# Patient Record
Sex: FEMALE | Race: WHITE | Employment: OTHER | URBAN - METROPOLITAN AREA
[De-identification: names, ages, dates, MRNs, and addresses within clinical notes are randomized per-mention and may not be internally consistent; named-entity substitution may affect disease eponyms.]

---

## 2017-01-13 ENCOUNTER — HOSPITAL ENCOUNTER (OUTPATIENT)
Dept: PHYSICAL THERAPY | Facility: CLINIC | Age: 67
Setting detail: THERAPIES SERIES
End: 2017-01-13
Attending: PSYCHIATRY & NEUROLOGY
Payer: COMMERCIAL

## 2017-01-13 PROCEDURE — 40000718 ZZHC STATISTIC PT DEPARTMENT ORTHO VISIT: Performed by: PHYSICAL THERAPIST

## 2017-01-13 PROCEDURE — 97140 MANUAL THERAPY 1/> REGIONS: CPT | Mod: GP | Performed by: PHYSICAL THERAPIST

## 2017-01-13 PROCEDURE — 97110 THERAPEUTIC EXERCISES: CPT | Mod: GP | Performed by: PHYSICAL THERAPIST

## 2017-01-23 ENCOUNTER — HOSPITAL ENCOUNTER (OUTPATIENT)
Dept: PHYSICAL THERAPY | Facility: CLINIC | Age: 67
Setting detail: THERAPIES SERIES
End: 2017-01-23
Attending: PSYCHIATRY & NEUROLOGY
Payer: COMMERCIAL

## 2017-01-23 PROCEDURE — 97140 MANUAL THERAPY 1/> REGIONS: CPT | Mod: GP | Performed by: PHYSICAL THERAPIST

## 2017-01-23 PROCEDURE — 97110 THERAPEUTIC EXERCISES: CPT | Mod: GP | Performed by: PHYSICAL THERAPIST

## 2017-01-23 PROCEDURE — 40000718 ZZHC STATISTIC PT DEPARTMENT ORTHO VISIT: Performed by: PHYSICAL THERAPIST

## 2017-02-08 ENCOUNTER — HOSPITAL ENCOUNTER (OUTPATIENT)
Dept: PHYSICAL THERAPY | Facility: CLINIC | Age: 67
Setting detail: THERAPIES SERIES
End: 2017-02-08
Attending: PSYCHIATRY & NEUROLOGY
Payer: COMMERCIAL

## 2017-02-08 PROCEDURE — 97140 MANUAL THERAPY 1/> REGIONS: CPT | Mod: GP | Performed by: PHYSICAL THERAPIST

## 2017-02-08 PROCEDURE — 97535 SELF CARE MNGMENT TRAINING: CPT | Mod: GP | Performed by: PHYSICAL THERAPIST

## 2017-02-08 PROCEDURE — 40000718 ZZHC STATISTIC PT DEPARTMENT ORTHO VISIT: Performed by: PHYSICAL THERAPIST

## 2017-02-08 PROCEDURE — 97110 THERAPEUTIC EXERCISES: CPT | Mod: GP | Performed by: PHYSICAL THERAPIST

## 2017-03-02 ENCOUNTER — HOSPITAL ENCOUNTER (OUTPATIENT)
Dept: PHYSICAL THERAPY | Facility: CLINIC | Age: 67
Setting detail: THERAPIES SERIES
End: 2017-03-02
Attending: PSYCHIATRY & NEUROLOGY
Payer: COMMERCIAL

## 2017-03-02 PROCEDURE — 97140 MANUAL THERAPY 1/> REGIONS: CPT | Mod: GP | Performed by: PHYSICAL THERAPIST

## 2017-03-02 PROCEDURE — 97110 THERAPEUTIC EXERCISES: CPT | Mod: GP | Performed by: PHYSICAL THERAPIST

## 2017-03-02 PROCEDURE — 40000718 ZZHC STATISTIC PT DEPARTMENT ORTHO VISIT: Performed by: PHYSICAL THERAPIST

## 2017-03-17 ENCOUNTER — HOSPITAL ENCOUNTER (OUTPATIENT)
Dept: PHYSICAL THERAPY | Facility: CLINIC | Age: 67
Setting detail: THERAPIES SERIES
End: 2017-03-17
Attending: PSYCHIATRY & NEUROLOGY
Payer: COMMERCIAL

## 2017-03-17 PROCEDURE — 40000718 ZZHC STATISTIC PT DEPARTMENT ORTHO VISIT: Performed by: PHYSICAL THERAPIST

## 2017-03-17 PROCEDURE — 97110 THERAPEUTIC EXERCISES: CPT | Mod: GP | Performed by: PHYSICAL THERAPIST

## 2017-03-17 PROCEDURE — 97140 MANUAL THERAPY 1/> REGIONS: CPT | Mod: GP | Performed by: PHYSICAL THERAPIST

## 2017-03-17 NOTE — PROGRESS NOTES
Outpatient Physical Therapy Discharge Note     Patient: Marcia Kauffman  : 1950    Beginning/End Dates of Reporting Period:  2016 to 3/17/2017    Referring Provider: Hilario Davis Diagnosis: Increased Hip flexor tightness secondary to chronic LBP     Client Self Report: Pt states she feels that her exercises have worked well for her, she has noticed a large difference from when she started to now. She is ready to d/c and continue with her home program today.    Goals:  Goal Identifier HEP   Goal Description Pt will be able to perform HEP with proper form and duration   Target Date 16   Date Met  16   Progress:     Goal Identifier Ambulation   Goal Description Pt will be able to walk to and from her mailbox with <2/10 pain (1/3 mile)   Target Date 17   Date Met  17   Progress:     Goal Identifier Strength   Goal Description Pt will demonstrate 4+/5 left hip flexor strength in order to perform ambulation without gait deviations   Target Date 17   Date Met  16   Progress:     Goal Identifier     Goal Description     Target Date     Date Met      Progress:     Goal Identifier     Goal Description     Target Date     Date Met      Progress:     Goal Identifier     Goal Description     Target Date     Date Met      Progress:     Goal Identifier     Goal Description     Target Date     Date Met      Progress:     Goal Identifier     Goal Description     Target Date     Date Met      Progress:     Progress Toward Goals:   Progress this reporting period: Pt demonstrates progress with strength as indicated by achieving goals and decreased tightness. Pt understands how to self manage symptoms with gua jarrett tool and stretching to decreased symptoms.          Plan:  Discharge from therapy.    Discharge:    Reason for Discharge: Patient has met all goals.    Equipment Issued: CED, MARGIE    Discharge Plan: Patient to continue home program.

## 2017-06-01 ENCOUNTER — AMBULATORY - HEALTHEAST (OUTPATIENT)
Dept: NEUROSURGERY | Facility: CLINIC | Age: 67
End: 2017-06-01

## 2017-06-08 ENCOUNTER — HOSPITAL ENCOUNTER (OUTPATIENT)
Dept: MRI IMAGING | Facility: CLINIC | Age: 67
Discharge: HOME | End: 2017-06-08
Attending: ORTHOPAEDIC SURGERY | Admitting: ORTHOPAEDIC SURGERY
Payer: COMMERCIAL

## 2017-06-08 ENCOUNTER — AMBULATORY - HEALTHEAST (OUTPATIENT)
Dept: NEUROSURGERY | Facility: CLINIC | Age: 67
End: 2017-06-08

## 2017-06-08 DIAGNOSIS — M54.9 BACK PAIN: ICD-10-CM

## 2017-06-08 PROCEDURE — 72158 MRI LUMBAR SPINE W/O & W/DYE: CPT

## 2017-06-08 PROCEDURE — A9585 GADOBUTROL INJECTION: HCPCS | Performed by: ORTHOPAEDIC SURGERY

## 2017-06-08 PROCEDURE — 25000128 H RX IP 250 OP 636: Performed by: ORTHOPAEDIC SURGERY

## 2017-06-08 RX ORDER — GADOBUTROL 604.72 MG/ML
5 INJECTION INTRAVENOUS ONCE
Status: COMPLETED | OUTPATIENT
Start: 2017-06-08 | End: 2017-06-08

## 2017-06-08 RX ADMIN — GADOBUTROL 5 ML: 604.72 INJECTION INTRAVENOUS at 10:36

## 2017-06-17 ENCOUNTER — AMBULATORY - HEALTHEAST (OUTPATIENT)
Dept: NEUROSURGERY | Facility: CLINIC | Age: 67
End: 2017-06-17

## 2017-06-19 ENCOUNTER — AMBULATORY - HEALTHEAST (OUTPATIENT)
Dept: NEUROSURGERY | Facility: CLINIC | Age: 67
End: 2017-06-19

## 2017-06-26 ENCOUNTER — COMMUNICATION - HEALTHEAST (OUTPATIENT)
Dept: NEUROSURGERY | Facility: CLINIC | Age: 67
End: 2017-06-26

## 2017-06-26 ENCOUNTER — AMBULATORY - HEALTHEAST (OUTPATIENT)
Dept: NEUROSURGERY | Facility: CLINIC | Age: 67
End: 2017-06-26

## 2017-06-27 ENCOUNTER — AMBULATORY - HEALTHEAST (OUTPATIENT)
Dept: NEUROSURGERY | Facility: CLINIC | Age: 67
End: 2017-06-27

## 2017-06-27 DIAGNOSIS — M54.9 BACK PAIN: ICD-10-CM

## 2017-07-03 ENCOUNTER — AMBULATORY - HEALTHEAST (OUTPATIENT)
Dept: NEUROSURGERY | Facility: CLINIC | Age: 67
End: 2017-07-03

## 2017-07-03 ENCOUNTER — OFFICE VISIT - HEALTHEAST (OUTPATIENT)
Dept: NEUROSURGERY | Facility: CLINIC | Age: 67
End: 2017-07-03

## 2017-07-03 ENCOUNTER — HOSPITAL ENCOUNTER (OUTPATIENT)
Dept: RADIOLOGY | Facility: CLINIC | Age: 67
Discharge: HOME | End: 2017-07-03
Attending: NEUROLOGICAL SURGERY

## 2017-07-03 ENCOUNTER — TRANSFERRED RECORDS (OUTPATIENT)
Dept: HEALTH INFORMATION MANAGEMENT | Facility: CLINIC | Age: 67
End: 2017-07-03

## 2017-07-03 DIAGNOSIS — M43.16 SPONDYLOLISTHESIS OF LUMBAR REGION: ICD-10-CM

## 2017-07-03 DIAGNOSIS — M40.36 FLATBACK SYNDROME OF LUMBAR REGION: ICD-10-CM

## 2017-07-03 DIAGNOSIS — Z98.1 HISTORY OF LUMBAR FUSION: ICD-10-CM

## 2017-07-03 DIAGNOSIS — M53.3 SACROILIAC JOINT DYSFUNCTION OF RIGHT SIDE: ICD-10-CM

## 2017-07-03 DIAGNOSIS — R20.0 NUMBNESS AND TINGLING IN LEFT HAND: ICD-10-CM

## 2017-07-03 DIAGNOSIS — G57.12 MERALGIA PARESTHETICA OF LEFT SIDE: ICD-10-CM

## 2017-07-03 DIAGNOSIS — R20.2 NUMBNESS AND TINGLING IN LEFT HAND: ICD-10-CM

## 2017-07-03 DIAGNOSIS — M54.9 BACK PAIN: ICD-10-CM

## 2017-07-03 DIAGNOSIS — M81.0 OSTEOPOROSIS: ICD-10-CM

## 2017-07-03 DIAGNOSIS — M54.2 NECK PAIN OF OVER 3 MONTHS DURATION: ICD-10-CM

## 2017-07-03 DIAGNOSIS — M47.26 OTHER SPONDYLOSIS WITH RADICULOPATHY, LUMBAR REGION: ICD-10-CM

## 2017-07-03 ASSESSMENT — MIFFLIN-ST. JEOR: SCORE: 1075.6

## 2017-07-12 ENCOUNTER — HOSPITAL ENCOUNTER (OUTPATIENT)
Dept: CT IMAGING | Facility: CLINIC | Age: 67
Discharge: HOME | End: 2017-07-12
Attending: NEUROLOGICAL SURGERY

## 2017-07-12 ENCOUNTER — HOSPITAL ENCOUNTER (OUTPATIENT)
Dept: MRI IMAGING | Facility: CLINIC | Age: 67
Discharge: HOME | End: 2017-07-12
Attending: NEUROLOGICAL SURGERY

## 2017-07-12 ENCOUNTER — TRANSFERRED RECORDS (OUTPATIENT)
Dept: HEALTH INFORMATION MANAGEMENT | Facility: CLINIC | Age: 67
End: 2017-07-12

## 2017-07-12 ENCOUNTER — HOSPITAL ENCOUNTER (OUTPATIENT)
Dept: RADIOLOGY | Facility: CLINIC | Age: 67
Discharge: HOME | End: 2017-07-12
Attending: NEUROLOGICAL SURGERY

## 2017-07-12 DIAGNOSIS — Z98.1 HISTORY OF LUMBAR FUSION: ICD-10-CM

## 2017-07-12 DIAGNOSIS — Z98.890 OTHER SPECIFIED POSTPROCEDURAL STATES: ICD-10-CM

## 2017-07-12 DIAGNOSIS — R20.0 NUMBNESS AND TINGLING IN LEFT HAND: ICD-10-CM

## 2017-07-12 DIAGNOSIS — R20.2 NUMBNESS AND TINGLING IN LEFT HAND: ICD-10-CM

## 2017-07-12 DIAGNOSIS — M40.36 FLATBACK SYNDROME OF LUMBAR REGION: ICD-10-CM

## 2017-07-12 DIAGNOSIS — M47.26 OTHER SPONDYLOSIS WITH RADICULOPATHY, LUMBAR REGION: ICD-10-CM

## 2017-07-28 ENCOUNTER — OFFICE VISIT - HEALTHEAST (OUTPATIENT)
Dept: NEUROSURGERY | Facility: CLINIC | Age: 67
End: 2017-07-28

## 2017-07-28 ENCOUNTER — MEDICAL CORRESPONDENCE (OUTPATIENT)
Dept: HEALTH INFORMATION MANAGEMENT | Facility: CLINIC | Age: 67
End: 2017-07-28

## 2017-07-28 ENCOUNTER — TRANSFERRED RECORDS (OUTPATIENT)
Dept: HEALTH INFORMATION MANAGEMENT | Facility: CLINIC | Age: 67
End: 2017-07-28

## 2017-07-28 DIAGNOSIS — M53.3 SACROILIAC JOINT DYSFUNCTION OF RIGHT SIDE: ICD-10-CM

## 2017-07-28 DIAGNOSIS — Z98.1 HISTORY OF LUMBAR FUSION: ICD-10-CM

## 2017-07-28 DIAGNOSIS — S32.009K LUMBAR PSEUDOARTHROSIS: ICD-10-CM

## 2017-07-28 DIAGNOSIS — R20.0 NUMBNESS AND TINGLING IN LEFT HAND: ICD-10-CM

## 2017-07-28 DIAGNOSIS — R20.2 NUMBNESS AND TINGLING IN LEFT HAND: ICD-10-CM

## 2017-07-28 DIAGNOSIS — M43.16 SPONDYLOLISTHESIS OF LUMBAR REGION: ICD-10-CM

## 2017-07-28 DIAGNOSIS — M81.0 OSTEOPOROSIS: ICD-10-CM

## 2017-07-28 DIAGNOSIS — M40.36 FLATBACK SYNDROME OF LUMBAR REGION: ICD-10-CM

## 2017-07-28 ASSESSMENT — MIFFLIN-ST. JEOR: SCORE: 1075.6

## 2017-08-11 ENCOUNTER — COMMUNICATION - HEALTHEAST (OUTPATIENT)
Dept: NEUROSURGERY | Facility: CLINIC | Age: 67
End: 2017-08-11

## 2017-09-25 ENCOUNTER — COMMUNICATION - HEALTHEAST (OUTPATIENT)
Dept: RADIOLOGY | Facility: CLINIC | Age: 67
End: 2017-09-25

## 2017-12-11 NOTE — TELEPHONE ENCOUNTER
Records Received From: [facility]    Date/Exam/Location  (specify location if different)   Office Notes: 7/28/17   Radiology Reports: XR Lumbar 7/3/17  XR Standing Spine 7/15/17  CT L Spine 7/12/17  MRI C Spine 7/12/17   Missing: Need imaging

## 2017-12-11 NOTE — TELEPHONE ENCOUNTER
APPT INFO    Date /Time: 12/21/17 8AM   Reason for Appt: Flat Back/ SI Joint Dysfunction   Ref Provider/Clinic: Dr. Christina Beckford - [facility]   Are there internal records? If yes, list: YES -     [facility]     Spinal Fusion 10/20/15 report in [facility]    MRI L Spine 6/8/17 in [facility], additional imaging in pacs (older imaging)   Patient Contact (Y/N) & Call Details: No - pt is referred   Action: Faxed request to [facility] to push imaging    [facility] records received & [facility]     OUTSIDE RECORDS CHECKLIST     CLINIC NAME COMMENTS REC (x) IMG (x)   [facility]  x x   [facility] Records scanned in Epic x na

## 2017-12-12 NOTE — TELEPHONE ENCOUNTER
Received Imaging From: [facility]    Image Type (x): Disc:_____  Pacs:___x__      Exam Date/Name: See previous note Comments: imaging in pacs

## 2017-12-19 ASSESSMENT — ENCOUNTER SYMPTOMS
LOSS OF CONSCIOUSNESS: 0
PARALYSIS: 0
BACK PAIN: 1
SPEECH CHANGE: 0
DYSURIA: 0
HEMATURIA: 0
MUSCLE CRAMPS: 1
DISTURBANCES IN COORDINATION: 0
NUMBNESS: 1
HEADACHES: 0
NECK PAIN: 1
DIZZINESS: 1
ARTHRALGIAS: 1
JOINT SWELLING: 0
FLANK PAIN: 0
DIFFICULTY URINATING: 0
TINGLING: 1
SEIZURES: 0
MYALGIAS: 1
TREMORS: 0
MUSCLE WEAKNESS: 1
WEAKNESS: 1
MEMORY LOSS: 0
STIFFNESS: 1

## 2017-12-21 ENCOUNTER — APPOINTMENT (OUTPATIENT)
Dept: SURGERY | Facility: CLINIC | Age: 67
End: 2017-12-21
Payer: COMMERCIAL

## 2017-12-21 ENCOUNTER — RADIANT APPOINTMENT (OUTPATIENT)
Dept: GENERAL RADIOLOGY | Facility: CLINIC | Age: 67
End: 2017-12-21
Payer: COMMERCIAL

## 2017-12-21 ENCOUNTER — OFFICE VISIT (OUTPATIENT)
Dept: SURGERY | Facility: CLINIC | Age: 67
End: 2017-12-21
Payer: COMMERCIAL

## 2017-12-21 ENCOUNTER — ANESTHESIA EVENT (OUTPATIENT)
Dept: SURGERY | Facility: CLINIC | Age: 67
End: 2017-12-21

## 2017-12-21 ENCOUNTER — PRE VISIT (OUTPATIENT)
Dept: ORTHOPEDICS | Facility: CLINIC | Age: 67
End: 2017-12-21

## 2017-12-21 ENCOUNTER — OFFICE VISIT (OUTPATIENT)
Dept: ORTHOPEDICS | Facility: CLINIC | Age: 67
End: 2017-12-21
Payer: COMMERCIAL

## 2017-12-21 VITALS — WEIGHT: 128.8 LBS | BODY MASS INDEX: 21.99 KG/M2 | HEIGHT: 64 IN

## 2017-12-21 VITALS
BODY MASS INDEX: 21.99 KG/M2 | HEART RATE: 62 BPM | HEIGHT: 64 IN | TEMPERATURE: 98.4 F | SYSTOLIC BLOOD PRESSURE: 108 MMHG | RESPIRATION RATE: 16 BRPM | OXYGEN SATURATION: 98 % | DIASTOLIC BLOOD PRESSURE: 62 MMHG | WEIGHT: 128.8 LBS

## 2017-12-21 DIAGNOSIS — M40.30 FLAT BACK SYNDROME, ACQUIRED: ICD-10-CM

## 2017-12-21 DIAGNOSIS — M40.30 FLAT BACK SYNDROME, ACQUIRED: Primary | ICD-10-CM

## 2017-12-21 RX ORDER — IBUPROFEN 200 MG
200 TABLET ORAL DAILY
Status: ON HOLD | COMMUNITY
End: 2018-05-17

## 2017-12-21 RX ORDER — NABUMETONE 500 MG/1
500 TABLET, FILM COATED ORAL PRN
Status: ON HOLD | COMMUNITY
End: 2018-05-17

## 2017-12-21 ASSESSMENT — ENCOUNTER SYMPTOMS: DYSRHYTHMIAS: 1

## 2017-12-21 NOTE — NURSING NOTE
"Reason For Visit:   Chief Complaint   Patient presents with     Eval/Assessment     low back pain       Primary MD: Ritika Pierce  Ref. MD: Dr. Nanette Beckford      Occupation retired.  Date of injury: ongoing    Date of surgery: 10/20/15  Type of surgery: low back fusion.  Smoker: No      Ht 1.63 m (5' 4.17\")  Wt 58.4 kg (128 lb 12.8 oz)  BMI 21.99 kg/m2    Pain Assessment  Patient Currently in Pain: Yes  0-10 Pain Scale: 6  Primary Pain Location: Back  Pain Orientation: Lower  Other Pain Locations: left hip and leg  Pain Descriptors: Sharp, Dull, Aching, Burning, Throbbing  Alleviating Factors: Stretching, Other (comment), NSAIDS (massage)  Aggravating Factors: Other (comment) (everything)    Oswestry (ODILON) Questionnaire    OSWESTRY DISABILITY INDEX 12/19/2017   Section 1 - Pain intensity 2   Section 2 - Personal care (washing, dressing, etc.)  0   Section 3 - Lifting 3   Section 4 - Walking 1   Section 5 - Sitting 2   Section 6 - Standing 2   Section 7 - Sleeping 1   Section 8 - Sex life (if applicable) 5   Section 9 - Social life 1   Section 10 - Traveling 1   Count 10   Sum 18   Oswestry Score (%) 36   SECTION 1-PAIN INTENSITY The pain is moderate at the moment.   SECTION 2-PERSONAL CARE (WASHING,DRESSING,ETC.) I can look after myself normally without causing additional pain.   SECTION 3-LIFTING Pain prevents me from lifting heavy weights but I can manage light to medium weights if they are conveniently positioned.   SECTION 4-WALKING Pain prevents me from walking more than one mile.   SECTION 5-SITTING Pain prevents me from sitting for more than 1 hour.   SECTION 6-STANDING Pain prevents me from standing for more than 1 hour.   SECTION 7-SLEEPING My sleep is occasionally interrupted by pain.   SECTION 8-SEX LIFE (IF APPLICABLE) Pain prevents me from having any sex life at all.   SECTION 9-SOCIAL LIFE My social life is normal but increases the degree of pain.   SECTION 10-TRAVELING I can travel anywhere " but it gives me additional pain.   Oswestry Disability Index: Count 10   ODILON: Total Score = SUM (total for answered questions) 18   Computed Oswestry Score 36 (%)   Some recent data might be hidden                  Visual Analog Pain Scale  Back Pain Scale 0-10: 3  Right leg pain: 0  Left leg pain: 3    Promis 10 Assessment    PROMIS 10 12/19/2017   In general, would you say your health is: Good   In general, would you say your quality of life is: Good   In general, how would you rate your physical health? Good   In general, how would you rate your mental health, including your mood and your ability to think? Very good   In general, how would you rate your satisfaction with your social activities and relationships? Very good   In general, please rate how well you carry out your usual social activities and roles Very good   To what extent are you able to carry out your everyday physical activities such as walking, climbing stairs, carrying groceries, or moving a chair? Mostly   How often have you been bothered by emotional problems such as feeling anxious, depressed or irritable? Never   How would you rate your fatigue on average? Moderate   How would you rate your pain on average?   0 = No Pain  to  10 = Worst Imaginable Pain 6   In general, would you say your health is: 3   In general, would you say your quality of life is: 3   In general, how would you rate your physical health? 3   In general, how would you rate your mental health, including your mood and your ability to think? 4   In general, how would you rate your satisfaction with your social activities and relationships? 4   In general, please rate how well you carry out your usual social activities and roles. (This includes activities at home, at work and in your community, and responsibilities as a parent, child, spouse, employee, friend, etc.) 4   To what extent are you able to carry out your everyday physical activities such as walking, climbing stairs,  carrying groceries, or moving a chair? 4   In the past 7 days, how often have you been bothered by emotional problems such as feeling anxious, depressed, or irritable? 1   In the past 7 days, how would you rate your fatigue on average? 3   In the past 7 days, how would you rate your pain on average, where 0 means no pain, and 10 means worst imaginable pain? 6   Global Mental Health Score 16   Global Physical Health Score 13   PROMIS TOTAL - SUBSCORES 29   Some recent data might be hidden                Gerda Shearer LPN

## 2017-12-21 NOTE — CONSULTS
Anesthesia Consult Note      Reason for consult: History Cardiac Arrhythmia with need for complex spinal surgery.    Date of Encounter: 12/21/2017  Referring Physician: Dr. Beckford  Primary Care Physician:  No primary care provider on file.      WENDY Kauffman is a 67 year old female who presents for anesthesia consult in preparation for possible T10 to pelvis fusion with posterior osteomtomies with Dr. Beckford on TBD at [facility].   Patient is here for risk evaluation for complex spinal surgery with date TBD. She is a 67 year old female with flat back syndrome with a history of previous L4-5 posterior instrument infusion. She presents for pre-operative evaluation assessment as requested by Dr. Beckford. She requires evaluation and anesthesia risk assessment prior to undergoing surgery/procedure  History is obtained from the patient.     Past Medical History  Past Medical History:   Diagnosis Date     Endometriosis, site unspecified      Herpes simplex without mention of complication     HSV 2     MEDICAL HISTORY OF -     L4/5 spondololisthesis     PONV (postoperative nausea and vomiting)      Postmenopausal atrophic vaginitis 2004    Estring vag ring-stopped     Premature ventricular contractions 2007     Radial styloid tenosynovitis     arthritis in back and wrists     Spinal stenosis, lumbar region, without neurogenic claudication      Symptomatic menopausal or female climacteric states     prev on HRT-orthoprefest, effexor, paxil       Past Surgical History  Past Surgical History:   Procedure Laterality Date     ARTHROSCOPY KNEE RT/LT  1/2010    Right knee, torn meniscus     BREAST LUMPECTOMY, RT/LT  3/1991    Right benign lump     CHOLECYSTECTOMY, OPEN  1981     FUSION SPINE POSTERIOR TWO LEVELS Left 10/20/2015    Procedure: FUSION SPINE POSTERIOR TWO LEVELS;  Surgeon: Leo Dixon MD;  Location: [facility] OR     H ABLATION PVC  2007     HC EXCIS TENDON SHEATH  LESION FOREARM/WRIST  2006    treatment for frozen thumb     HYSTEROSCOPY  1987     LAPAROSCOPY DIAGNOSTIC (GYN)  1984    3 total scopes for fertility     SURGICAL HISTORY OF -   09/14/95    Excision of cyst on back         Prior to Admission Medications  Current Outpatient Prescriptions   Medication Sig Dispense Refill     ibuprofen (ADVIL/MOTRIN) 200 MG tablet Take 200 mg by mouth daily       UNABLE TO FIND Apply 0.25 tsp. topically daily MEDICATION NAME: Bio- estrol, plant based hormone replacement therapy        valACYclovir (VALTREX) 500 MG tablet Take 1 tablet (500 mg) by mouth 2 times daily For 3 days when flares occur 30 tablet 3     Omega-3 Fatty Acids (OMEGA-3 FISH OIL PO) Take 1,200 mg by mouth 2 times daily (with meals)       NONFORMULARY Take 1 capsule by mouth nightly as needed Sleep [facility]   features l-theanine and taurine to help relaxation and sleep       Coenzyme Q10 (CO Q 10 PO) Take 1 chew tab by mouth every morning        VITAMIN C 500 MG OR TABS 1 tab daily AT BREAKFAST       UNCLASSIFIED OTC PRODUCT MISC Calcium AEP 1-2 capsules by mouth daily  Supports cell membranes* BID       UNCLASSIFIED OTC PRODUCT MISC RelaxaMag  1 capsule at bedtime as needed -  a unique magnesium supplement that helps improve sleep and stress tolerance in the evening       FLAX SEED OIL OR 1 heaping tsp daily on cereal       GLUCOSAMINE CHONDR 500 COMPLEX OR CAPS 1 capsule daily at noon       DAILY MULTI VITAMIN/MINERALS OR 1 TABLET DAILY AT BREAKFAST       nabumetone (RELAFEN) 500 MG tablet Take 500 mg by mouth as needed       montelukast (SINGULAIR) 10 MG tablet Take 1 tablet (10 mg) by mouth daily (Patient taking differently: Take 10 mg by mouth daily as needed ) 30 tablet 11       Allergies  Allergies   Allergen Reactions     Mold        Social History  Social History     Social History     Marital status:      Spouse name: N/A     Number of children: 0     Years of education: N/A     Occupational History       [employer]     nurse assistant      Retired     Social History Main Topics     Smoking status: Never Smoker     Smokeless tobacco: Never Used     Alcohol use 0.0 - 0.6 oz/week     0 - 1 Standard drinks or equivalent per week      Comment: rare     Drug use: No     Sexual activity: No     Other Topics Concern     Parent/Sibling W/ Cabg, Mi Or Angioplasty Before 65f 55m? No     Social History Narrative       Family History  Family History   Problem Relation Age of Onset     Breast Cancer Mother      cancer -free for 17 years, XRT     Arthritis Mother      Eye Disorder Mother      macular degeneration     HEART DISEASE Mother      murmur     CEREBROVASCULAR DISEASE Mother      DIABETES Father      type 2     Arthritis Father      Arthritis Sister      Family History Negative No family hx of      Blood Disease Paternal Grandfather            Review of Systems  Functional status:  independet ADL's.  >4 METS.     The complete review of systems is negative other than noted in the HPI or here.   Constitutional: Denies recent changes in weight, sleeping patterns, or fevers/chills.  Eyes: wears glasses for vision correction.  No recent vision changes.  EENT: Denies recent changes in hearing, mouth pain, or difficulty swallowing.  Cardiovascular: Denies chest pain, VELEZ or orthopnea, or palpitations.  Respiratory: Denies shortness of breath or significant cough.    GI: Denies nausea/vomiting or diarrhea/constipation.    : Denies dysuria.    Musculoskeletal: Denies joint pain or swelling.    Skin: Denies rashes or wounds.    Hematologic: Denies easy bruising or bleeding.    Neurologic: Denies migraines, seizures, dizziness, numbness/tingling.  Psychiatric: Denies changes in mood or affect.        Physical Exam  Constitutional: Patient awake, seated upright in a chair, in no apparent distress.  Appears stated age.  Eyes: Pupils equal, round and reactive to light.  Extra ocular muscles intact. Sclera clear.  Conjunctiva  "normal.  HENT: Head normocephalic.  Oral pharynx intact with moist mucous membranes. No thyromegaly appreciated.   Respiratory: Lung sounds clear to auscultation bilaterally.  No rales, rhonchi, or wheezing noted.    Cardiovascular: S1, S2, regular rate and rhythm.  No murmurs, rubs, or gallops noted. No carotid bruits auscultated.  Radial and pedal pulses palpable, bilaterally.  no edema noted.   GI: Bowel sounds present.  Abdomen: soft, non-tender to light palpation.  No hepatosplenomegaly or masses palpated.   Genitourinary: Exam deferred.  Lymph/Hematologic: No cervical or supraclavicular lymphadenopathy noted.  No excessive bruising noted.    Skin: Color appropriate for race, warm, dry.  No rashes or wounds at anticipated surgical site.   Musculoskeletal: Limited extension of the neck.  No redness, warmth, or swelling of the joints noted. Gross motor strength is normal.    Neurologic: Alert, oriented to name, place and time. Cranial nerves II-XII are grossly intact. Ambulates with walking stick for assistance.      Neuropsychiatric: Calm, cooperative. Normal affect.      Temp: 98.4  F (36.9  C) Temp src: Oral BP: 108/62 Pulse: 62   Resp: 16 SpO2: 98 %         128 lbs 12.8 oz  5' 4\"   Body mass index is 22.11 kg/(m^2).       Physical Exam    Airway   Mallampati: 1  TM distance: >3  Neck ROM: Limited  Neck:  No goiter, trachea midline    Dental   Permanent upper partial    Constitutional: Awake, alert, cooperative, no apparent distress, and appears stated age.  Eyes: Pupils equal, round and reactive to light, extra ocular muscles intact, sclera clear, conjunctiva normal.  HENT: Normocephalic, oral pharynx with moist mucus membranes  Respiratory: Clear to auscultation bilaterally, no crackles or wheezing.  Cardiovascular: Regular rate and rhythm, normal S1 and S2, and no murmur noted.  Carotids +2, no bruits. No edema. Palpable pulses to radial  DP and PT arteries.   GI: Normal bowel sounds, soft, non-distended, " non-tender, no masses palpated, no hepatosplenomegaly.    Skin: Warm and dry.    Musculoskeletal: There is no redness, warmth, or swelling of the joints. Gross motor strength is normal.    Neurologic: Awake, alert, oriented to name, place and time. Cranial nerves II-XII are grossly intact. Gait is normal.   Neuropsychiatric: Calm, cooperative. Normal affect.       Outside records reviewed from:  Care everywhere    ASSESSMENT and PLAN  Marcia Kauffman is scheduled for  on  Possible T10 to pelvis fusion with posterior osteotomies by Dr. Beckford on TBD.  PAC referral for risk assessment and optimization for anesthesia with comorbid conditions of the following:    Pre-operative considerations:  1.  Cardiac:  Functional status- METS >4. Walks 1-2 miles daily with walking stick for balance. With activity denies chest pain, dizziness or shortness of breath. high risk surgery with 0.4% (RCRI #) risk of major adverse cardiac event.        Cardiac Dysrhythmia ( right Vent. Outflow tract PVC'S) s/p ablation 2007       Echocardiogram 11/2016 EF 60-65%; Mild MR       Stress Echo 4/2012  - no stress-induced wall motion abnormality       2.  Pulm:  Airway feasible.  CAROLYNN risk: low. Patient has reports no pulmonary history or symptoms.       Limited ROM- painful with head tilting and rotating  3.  GI:  + history of PONV.  anti-emetic intervention recommended.  4.  HEME: VTE risk: 0.5%          BRENT Alicia CNP      Preoperative Assessment Center  [facility]  Office phone: [phone]  Fax: [phone]

## 2017-12-21 NOTE — Clinical Note
12/21/2017       RE: Marcia Kauffman  [id number]  [facility] [id number]     Dear Colleague,    Thank you for referring your patient, Marcia Kauffman, to the [facility] at [facility]. Please see a copy of my visit note below.    No notes on file    Again, thank you for allowing me to participate in the care of your patient.      Sincerely,    Hector Aldana MD

## 2017-12-21 NOTE — CONSULTS
ADDENDUM: Ms. Gamino and I saw Marcia Kauffman at the request of Dr. Aldana /Dr. Beckford for evaluation of her cardiac status prior to scheduling her possible complex spine surgery. Ms. Kauffman had ablation of  PVC focus in her right ventricular outflow track some years ago and has done well since then. Our specific recommendations would be to avoid agents (such as compazine) that can significant prolong the QT interval. Other wise, she should be managed with the complex spine surgery protocol.     She should also have an ECG the am of surgery or at her H&P to evaluate and other conduction abnormalities.

## 2017-12-21 NOTE — MR AVS SNAPSHOT
After Visit Summary   12/21/2017    Marcia Kauffman    MRN: [id number]           Patient Information     Date Of Birth          1950        Visit Information        Provider Department      12/21/2017 8:00 AM Hector Aldana MD [facility]        Today's Diagnoses     Flat back syndrome, acquired    -  1       Follow-ups after your visit        Your next 10 appointments already scheduled     Dec 21, 2017  1:30 PM CST   (Arrive by 1:15 PM)   PAC EVALUATION with Uc Pac Aliya 10   [facility] [place] [facility]    [address] [place] [id number] [phone]            Dec 21, 2017  2:30 PM CST   (Arrive by 2:15 PM)   PAC RN ASSESSMENT with Uc Pac Rn   [facility] [place] [facility]    [address] [place] [id number] [phone]            Dec 21, 2017  2:50 PM CST   (Arrive by 2:35 PM)   PAC Anesthesia Consult with  Pac Anesthesiologist   [facility] [place] [facility]    [address] [place] [id number] [phone]              Future tests that were ordered for you today     Open Future Orders        Priority Expected Expires Ordered    Pac Anesth Consult Routine  12/21/2018 12/21/2017            Who to contact     Please call your clinic at [phone] to:    Ask questions about your health    Make or cancel appointments    Discuss your medicines    Learn about your test results    Speak to your doctor   If you have compliments or concerns about an experience at your clinic, or if you wish to file a complaint, please contact [facility] Patient Relations at [phone] or email us at [email or web address]         Additional Information About Your Visit        MyChart Information     [employer] gives you secure  "access to your electronic health record. If you see a primary care provider, you can also send messages to your care team and make appointments. If you have questions, please call your primary care clinic.  If you do not have a primary care provider, please call [phone] and they will assist you.      [employer] is an electronic gateway that provides easy, online access to your medical records. With [employer] you can request a clinic appointment, read your test results, renew a prescription or communicate with your care team.     To access your existing account, please contact your [facility] or call [phone] for assistance.        Care EveryWhere ID     This is your Care EveryWhere ID. This could be used by other organizations to access your [place] medical records  [id number]        Your Vitals Were     Height BMI (Body Mass Index)                1.63 m (5' 4.17\") 21.99 kg/m2           Blood Pressure from Last 3 Encounters:   11/15/16 116/57   08/29/16 112/50   05/18/16 103/64    Weight from Last 3 Encounters:   12/21/17 58.4 kg (128 lb 12.8 oz)   11/15/16 55.5 kg (122 lb 6.4 oz)   08/29/16 57.2 kg (126 lb)                 Today's Medication Changes          These changes are accurate as of: 12/21/17 10:37 AM.  If you have any questions, ask your nurse or doctor.               These medicines have changed or have updated prescriptions.        Dose/Directions    fluticasone 50 MCG/ACT spray   Commonly known as:  FLONASE   This may have changed:    - when to take this  - reasons to take this   Used for:  AR (allergic rhinitis)        Dose:  2 spray   Spray 2 sprays into both nostrils daily   Quantity:  1 Package   Refills:  11       montelukast 10 MG tablet   Commonly known as:  SINGULAIR   This may have changed:    - when to take this  - reasons to take this   Used for:  Allergic urticaria        Dose:  10 mg   Take 1 tablet (10 mg) by mouth daily   Quantity:  30 tablet "   Refills:  11         Stop taking these medicines if you haven't already. Please contact your care team if you have questions.     estradiol 10 MCG Tabs vaginal tablet   Commonly known as:  VAGIFEM   Stopped by:  Hector Aldana MD           vitamin E 400 UNITS Tabs   Stopped by:  Hector Aldana MD                    Primary Care Provider    None Specified       No primary provider on file.        Equal Access to Services     [facility]: Hadii aad ku hadasho Soomaali, waaxda luqadaha, qaybta kaalmada adeegyada, waxay micahin hayaan adeeg xavi laInésaan . So [facility] [place] [phone].    ATENCIÓN: Si habla español, tiene a montez disposición servicios gratuitos de asistencia lingüística. Llame al [phone].    We comply with applicable federal civil rights laws and [place] laws. We do not discriminate on the basis of race, color, national origin, age, disability, sex, sexual orientation, or gender identity.            Thank you!     Thank you for choosing [facility]  for your care. Our goal is always to provide you with excellent care. Hearing back from our patients is one way we can continue to improve our services. Please take a few minutes to complete the written survey that you may receive in the mail after your visit with us. Thank you!             Your Updated Medication List - Protect others around you: Learn how to safely use, store and throw away your medicines at www.disposemymeds.org.          This list is accurate as of: 12/21/17 10:37 AM.  Always use your most recent med list.                   Brand Name Dispense Instructions for use Diagnosis    ascorbic acid 500 MG tablet    VITAMIN C     1 tab daily        calcium 500 1250 (500 CA) MG Tabs tablet   Generic drug:  calcium carbonate      total of 1200 mg daily        CO Q 10 PO      Take 1 chew tab by mouth daily        DAILY MULTI VITAMIN/MINERALS PO      1 TABLET DAILY        FLAX SEED OIL PO      1 heaping tsp daily on cereal         fluticasone 50 MCG/ACT spray    FLONASE    1 Package    Spray 2 sprays into both nostrils daily    AR (allergic rhinitis)       glucosamine chondroitin 500 complex Caps      1 capsule daily        ketamine (KETALAR) 5%-gabapentin (NEURONTIN) 8%-lidocaine 2.5% 5%-8% Gel topical PLO cream     30 g    Apply 1 g topically 3 times daily as needed    Meralgia paresthetica of left side       montelukast 10 MG tablet    SINGULAIR    30 tablet    Take 1 tablet (10 mg) by mouth daily    Allergic urticaria       NONFORMULARY      Take 1 capsule by mouth nightly as needed Sleep Gardner?  features l-theanine and taurine to help relaxation and sleep        OMEGA-3 FISH OIL PO      Take 1,200 mg by mouth 2 times daily (with meals)        * UNABLE TO FIND      Apply 0.25 tsp. topically 2 times daily as needed MEDICATION NAME: Bio- estrol, plant based hormone replacement therapy        * UNABLE TO FIND      MEDICATION NAME: plant based estrogen and progesterin.        * UNCLASSIFIED OTC PRODUCT Misc      Calcium AEP 1-2 capsules by mouth daily ?Supports cell membranes*        * UNCLASSIFIED OTC PRODUCT Misc      RelaxaMag? 1 capsule at bedtime as needed -  a unique magnesium supplement that helps improve sleep and stress tolerance        valACYclovir 500 MG tablet    VALTREX    30 tablet    Take 1 tablet (500 mg) by mouth 2 times daily For 3 days when flares occur    Herpes simplex virus (HSV) infection       * Notice:  This list has 4 medication(s) that are the same as other medications prescribed for you. Read the directions carefully, and ask your doctor or other care provider to review them with you.

## 2017-12-21 NOTE — LETTER
12/21/2017      RE: Marcia Kauffman  [id number]  [place] [phone]       Spine Surgery Consultation    REFERRING PHYSICIAN: Nanette Beckford   PRIMARY CARE PHYSICIAN: No primary care provider on file.           Chief Complaint:   Eval/Assessment (low back pain)      History of Present Illness:  Symptom Profile Including: location of symptoms, onset, severity, exacerbating/alleviating factors, previous treatments:        Marcia Kauffman is a 67 year old female who presents for evaluation by Dr. Nanette Beckford for evaluation of primarily low back pain.    She had a L4 5 posterior spine fusion with Dr. Dixon on 10/20/2015 for a spondylolisthesis. This was reduced at the time of surgery. She was having back pain at this time. And she did have moderate relief for a short time after surgery. However the pain recurred and has been worsening over the last 2 years. She was evaluated by Dr. Dixon been noted to have pullout of her L4 screws and recurrent slip. In addition she has developed left leg pain. This radiates down the posterior buttock and lateral thigh to the lateral foot. In addition she's had left anterior and lateral thigh pain since the time of her last surgery. This has been unrelenting. She's been followed by neurology and tried multiple modalities. 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     She does endorse numbness and tingling in her anterior lateral thigh mattress and persistent. At time she has this going down her leg. This is worse with prolonged activity. Pain is somewhat improved with sitting and lying down. She is currently not taking any narcotic pain medications.    She has been seen and evaluated by Dr. Beckford who is recommending surgery for her deformity and pain. She has been started on Forteo for osteoporosis.    She is otherwise relatively healthy. Had a history of A. fib  status post ablation. She is not on any blood thinners. She is retired. No history of smoking or I'll call use. She is otherwise relatively active and travels with her .     75% back pain, 25% left leg pain.  Pain 6/10  ODILON 36%  Promis-29         Past Medical History:     Past Medical History:   Diagnosis Date     Endometriosis, site unspecified      Herpes simplex without mention of complication     HSV 2     MEDICAL HISTORY OF -     L4/5 spondololisthesis     PONV (postoperative nausea and vomiting)      Postmenopausal atrophic vaginitis 2004    Estring vag ring-stopped     Radial styloid tenosynovitis     arthritis in back and wrists     Spinal stenosis, lumbar region, without neurogenic claudication      Symptomatic menopausal or female climacteric states     prev on HRT-orthoprefest, effexor, paxil            Past Surgical History:     Past Surgical History:   Procedure Laterality Date     ARTHROSCOPY KNEE RT/LT  1/2010    Right knee, torn meniscus     BREAST LUMPECTOMY, RT/LT  3/1991    Right benign lump     CHOLECYSTECTOMY, OPEN  1981     FUSION SPINE POSTERIOR TWO LEVELS Left 10/20/2015    Procedure: FUSION SPINE POSTERIOR TWO LEVELS;  Surgeon: Leo Dixon MD;  Location: [facility] OR     HC EXCIS TENDON SHEATH LESION FOREARM/WRIST  2006    treatment for frozen thumb     HYSTEROSCOPY  1987     LAPAROSCOPY DIAGNOSTIC (GYN)  1984    3 total scopes for fertility     SURGICAL HISTORY OF -   09/14/95    Excision of cyst on back            Social History:     Social History   Substance Use Topics     Smoking status: Never Smoker     Smokeless tobacco: Never Used     Alcohol use 0.0 - 0.6 oz/week     0 - 1 Standard drinks or equivalent per week      Comment: rare            Family History:     Family History   Problem Relation Age of Onset     Breast Cancer Mother      cancer -free for 17 years, XRT     Arthritis Mother      Eye Disorder Mother      macular degeneration     HEART DISEASE Mother      murmur  "    CEREBROVASCULAR DISEASE Mother      DIABETES Father      type 2     Arthritis Father      Arthritis Sister      Family History Negative No family hx of      Blood Disease Paternal Grandfather             Allergies:     Allergies   Allergen Reactions     Mold             Medications:     Current Outpatient Prescriptions   Medication     UNABLE TO FIND     UNABLE TO FIND     valACYclovir (VALTREX) 500 MG tablet     Omega-3 Fatty Acids (OMEGA-3 FISH OIL PO)     NONFORMULARY     montelukast (SINGULAIR) 10 MG tablet     fluticasone (FLONASE) 50 MCG/ACT nasal spray     Coenzyme Q10 (CO Q 10 PO)     VITAMIN C 500 MG OR TABS     UNCLASSIFIED OTC PRODUCT MISC     UNCLASSIFIED OTC PRODUCT MISC     FLAX SEED OIL OR     GLUCOSAMINE CHONDR 500 COMPLEX OR CAPS     DAILY MULTI VITAMIN/MINERALS OR     CALCIUM 500 500 MG OR TABS     ketamine, KETALAR, 5%-gabapentin, NEURONTIN, 8%-lidocaine 2.5% 5%-8% GEL in PLO cream     No current facility-administered medications for this visit.              Review of Systems:     A 10 point ROS was performed and reviewed. Specific responses to these questions are noted at the end of the document.         Physical Exam:   Vitals: Ht 1.63 m (5' 4.17\")  Wt 58.4 kg (128 lb 12.8 oz)  BMI 21.99 kg/m2  Constitutional: awake, alert, cooperative, no apparent distress, appears stated age.    Eyes: The sclera are white.  Ears, Nose, Throat: The trachea is midline.  Psychiatric: The patient has a normal affect.  Respiratory: breathing non-labored  Cardiovascular: The extremities are warm and perfused.  Skin: no obvious rashes or lesions.  Musculoskeletal, Neurologic, and Spine:   Non-antalgic gait without use of assistive devices.       Lumbar Spine:    Appearance - flat back    Palpation - mild tenderness throughout the low lumbar spine.     Motor -     L2-3: Hip flexion L and R %/5 strength          L4:  Knee extension L and R 5/5 strength         L5:  Foot / EHL dorsiflexion L and R 5/5 strength     "     S1:  Plantarflexion  L and R 5/5 strength    Sensation: intact to light touch L3-S1 distribution BLE      Special tests -     Straight leg raise - Negative bilat     FELICIANO - Positive for left sided pain     Pain with hip ROM - Negative           Neurologic:      REFLEXES Left Right                  Patella 3+ 2+   Ankle jerk 2+ 2+   Babinski Down going Down going                         Imaging:   We ordered and independently reviewed new radiographs at this clinic visit. The results were discussed with the patient.  Findings include:    Following spine films as well as CT scan MRI of her spine were reviewed today. She has significant sagittal plane deformity with PI of 60 and lumbar lordosis of 25 with a PI-LL mismatch of approximate 35 . In addition she has a transitional segment vertebrae of the sacrum.             Assessment and Plan:   Assessment:  67 year old female with flat back syndrome with history of previous L4-5 posterior instrument infusion.  She is referred by Dr.Kristen Beckford for discussion evaluation for a possible T10 to pelvis fusion with posterior osteotomies to correct her deformity. A long discussion today regarding this proposed surgery. We discussed that surgery is up to her at this point as it's a question of quality of life. Her bony I however is on the lower end. If she does not want to pursue surgery this would depend on timing of both Dr. Beckford and Dr. Aldana's OR schedule availability.      Risks of surgery were discussed including risks of general anesthesia including death, stroke, blood clots, pneumonia. Risks of bleeding including the need for blood transfusion. Risk of injury to nerves including risk of nerve injury and nerve irritation leading to weakness and numbness. Risk of infection which is about 1-2%. The risk of pseudoarthrosis which can happen as late as 5-10 years out from surgery which can lead to robert breakage and need for revision surgery.    Plan:  We recommend that  she be seen by her preanesthesia team for risk evaluation.  It is up to her and OR availability regarding timing.    Ortiz Finney  PGY4  [phone]    Patient seen and discussed with Dr. Aldana who is in agreement with the above assessment and plan  Total Time = 45 min, 50% of which was spent in counseling and coordination of care as documented above.      I saw and evaluated the patient on the day of the visit and I formulated the plan.  Hector Aldana MD            Answers for HPI/ROS submitted by the patient on 12/19/2017   General Symptoms: No  Skin Symptoms: No  HENT Symptoms: No  EYE SYMPTOMS: No  HEART SYMPTOMS: No  LUNG SYMPTOMS: No  INTESTINAL SYMPTOMS: No  URINARY SYMPTOMS: Yes  GYNECOLOGIC SYMPTOMS: No  BREAST SYMPTOMS: No  SKELETAL SYMPTOMS: Yes  BLOOD SYMPTOMS: No  NERVOUS SYSTEM SYMPTOMS: Yes  MENTAL HEALTH SYMPTOMS: No  Trouble holding urine or incontinence: Yes  Pain or burning: No  Trouble starting or stopping: No  Increased frequency of urination: No  Blood in urine: No  Decreased frequency of urination: No  Frequent nighttime urination: No  Flank pain: No  Difficulty emptying bladder: No  Back pain: Yes  Muscle aches: Yes  Neck pain: Yes  Swollen joints: No  Joint pain: Yes  Bone pain: No  Muscle cramps: Yes  Muscle weakness: Yes  Joint stiffness: Yes  Bone fracture: No  Trouble with coordination: No  Dizziness or trouble with balance: Yes  Fainting or black-out spells: No  Memory loss: No  Headache: No  Seizures: No  Speech problems: No  Tingling: Yes  Tremor: No  Weakness: Yes  Difficulty walking: Yes  Paralysis: No  Numbness: Yes      Hector Aldana MD

## 2017-12-21 NOTE — PROGRESS NOTES
Spine Surgery Consultation    REFERRING PHYSICIAN: Nanette Beckford   PRIMARY CARE PHYSICIAN: No primary care provider on file.           Chief Complaint:   Eval/Assessment (low back pain)      History of Present Illness:  Symptom Profile Including: location of symptoms, onset, severity, exacerbating/alleviating factors, previous treatments:        Marcia Kauffman is a 67 year old female who presents for evaluation by Dr. Nanette Beckford for evaluation of primarily low back pain.    She had a L4 5 posterior spine fusion with Dr. Dixon on 10/20/2015 for a spondylolisthesis. This was reduced at the time of surgery. She was having back pain at this time. And she did have moderate relief for a short time after surgery. However the pain recurred and has been worsening over the last 2 years. She was evaluated by Dr. Dixon been noted to have pullout of her L4 screws and recurrent slip. In addition she has developed left leg pain. This radiates down the posterior buttock and lateral thigh to the lateral foot. In addition she's had left anterior and lateral thigh pain since the time of her last surgery. This has been unrelenting. She's been followed by neurology and tried multiple modalities. 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     She does endorse numbness and tingling in her anterior lateral thigh mattress and persistent. At time she has this going down her leg. This is worse with prolonged activity. Pain is somewhat improved with sitting and lying down. She is currently not taking any narcotic pain medications.    She has been seen and evaluated by Dr. Beckford who is recommending surgery for her deformity and pain. She has been started on Forteo for osteoporosis.    She is otherwise relatively healthy. Had a history of A. fib status post ablation. She is not on any blood thinners. She is retired. No history of  smoking or I'll call use. She is otherwise relatively active and travels with her .     75% back pain, 25% left leg pain.  Pain 6/10  ODILON 36%  Promis-29         Past Medical History:     Past Medical History:   Diagnosis Date     Endometriosis, site unspecified      Herpes simplex without mention of complication     HSV 2     MEDICAL HISTORY OF -     L4/5 spondololisthesis     PONV (postoperative nausea and vomiting)      Postmenopausal atrophic vaginitis 2004    Estring vag ring-stopped     Radial styloid tenosynovitis     arthritis in back and wrists     Spinal stenosis, lumbar region, without neurogenic claudication      Symptomatic menopausal or female climacteric states     prev on HRT-orthoprefest, effexor, paxil            Past Surgical History:     Past Surgical History:   Procedure Laterality Date     ARTHROSCOPY KNEE RT/LT  1/2010    Right knee, torn meniscus     BREAST LUMPECTOMY, RT/LT  3/1991    Right benign lump     CHOLECYSTECTOMY, OPEN  1981     FUSION SPINE POSTERIOR TWO LEVELS Left 10/20/2015    Procedure: FUSION SPINE POSTERIOR TWO LEVELS;  Surgeon: Leo Dixon MD;  Location: [facility]     HC EXCIS TENDON SHEATH LESION FOREARM/WRIST  2006    treatment for frozen thumb     HYSTEROSCOPY  1987     LAPAROSCOPY DIAGNOSTIC (GYN)  1984    3 total scopes for fertility     SURGICAL HISTORY OF -   09/14/95    Excision of cyst on back            Social History:     Social History   Substance Use Topics     Smoking status: Never Smoker     Smokeless tobacco: Never Used     Alcohol use 0.0 - 0.6 oz/week     0 - 1 Standard drinks or equivalent per week      Comment: rare            Family History:     Family History   Problem Relation Age of Onset     Breast Cancer Mother      cancer -free for 17 years, XRT     Arthritis Mother      Eye Disorder Mother      macular degeneration     HEART DISEASE Mother      murmur     CEREBROVASCULAR DISEASE Mother      DIABETES Father      type 2     Arthritis  "Father      Arthritis Sister      Family History Negative No family hx of      Blood Disease Paternal Grandfather             Allergies:     Allergies   Allergen Reactions     Mold             Medications:     Current Outpatient Prescriptions   Medication     UNABLE TO FIND     UNABLE TO FIND     valACYclovir (VALTREX) 500 MG tablet     Omega-3 Fatty Acids (OMEGA-3 FISH OIL PO)     NONFORMULARY     montelukast (SINGULAIR) 10 MG tablet     fluticasone (FLONASE) 50 MCG/ACT nasal spray     Coenzyme Q10 (CO Q 10 PO)     VITAMIN C 500 MG OR TABS     UNCLASSIFIED OTC PRODUCT MISC     UNCLASSIFIED OTC PRODUCT MISC     FLAX SEED OIL OR     GLUCOSAMINE CHONDR 500 COMPLEX OR CAPS     DAILY MULTI VITAMIN/MINERALS OR     CALCIUM 500 500 MG OR TABS     ketamine, KETALAR, 5%-gabapentin, NEURONTIN, 8%-lidocaine 2.5% 5%-8% GEL in PLO cream     No current facility-administered medications for this visit.              Review of Systems:     A 10 point ROS was performed and reviewed. Specific responses to these questions are noted at the end of the document.         Physical Exam:   Vitals: Ht 1.63 m (5' 4.17\")  Wt 58.4 kg (128 lb 12.8 oz)  BMI 21.99 kg/m2  Constitutional: awake, alert, cooperative, no apparent distress, appears stated age.    Eyes: The sclera are white.  Ears, Nose, Throat: The trachea is midline.  Psychiatric: The patient has a normal affect.  Respiratory: breathing non-labored  Cardiovascular: The extremities are warm and perfused.  Skin: no obvious rashes or lesions.  Musculoskeletal, Neurologic, and Spine:   Non-antalgic gait without use of assistive devices.       Lumbar Spine:    Appearance - flat back    Palpation - mild tenderness throughout the low lumbar spine.     Motor -     L2-3: Hip flexion L and R %/5 strength          L4:  Knee extension L and R 5/5 strength         L5:  Foot / EHL dorsiflexion L and R 5/5 strength         S1:  Plantarflexion  L and R 5/5 strength    Sensation: intact to light touch " L3-S1 distribution BLE      Special tests -     Straight leg raise - Negative bilat     FELICIANO - Positive for left sided pain     Pain with hip ROM - Negative           Neurologic:      REFLEXES Left Right                  Patella 3+ 2+   Ankle jerk 2+ 2+   Babinski Down going Down going                         Imaging:   We ordered and independently reviewed new radiographs at this clinic visit. The results were discussed with the patient.  Findings include:    Following spine films as well as CT scan MRI of her spine were reviewed today. She has significant sagittal plane deformity with PI of 60 and lumbar lordosis of 25 with a PI-LL mismatch of approximate 35 . In addition she has a transitional segment vertebrae of the sacrum.             Assessment and Plan:   Assessment:  67 year old female with flat back syndrome with history of previous L4-5 posterior instrument infusion.  She is referred by Dr.Kristen Beckford for discussion evaluation for a possible T10 to pelvis fusion with posterior osteotomies to correct her deformity. A long discussion today regarding this proposed surgery. We discussed that surgery is up to her at this point as it's a question of quality of life. Her bony I however is on the lower end. If she does not want to pursue surgery this would depend on timing of both Dr. Beckford and Dr. Aldana's OR schedule availability.      Risks of surgery were discussed including risks of general anesthesia including death, stroke, blood clots, pneumonia. Risks of bleeding including the need for blood transfusion. Risk of injury to nerves including risk of nerve injury and nerve irritation leading to weakness and numbness. Risk of infection which is about 1-2%. The risk of pseudoarthrosis which can happen as late as 5-10 years out from surgery which can lead to robert breakage and need for revision surgery.    Plan:  We recommend that she be seen by her preanesthesia team for risk evaluation.  It is up to her and  OR availability regarding timing.    Ortiz Finney  PGY4  [phone]    Patient seen and discussed with Dr. Aldana who is in agreement with the above assessment and plan  Total Time = 45 min, 50% of which was spent in counseling and coordination of care as documented above.      I saw and evaluated the patient on the day of the visit and I formulated the plan.  Hector Aldana MD            Answers for HPI/ROS submitted by the patient on 12/19/2017   General Symptoms: No  Skin Symptoms: No  HENT Symptoms: No  EYE SYMPTOMS: No  HEART SYMPTOMS: No  LUNG SYMPTOMS: No  INTESTINAL SYMPTOMS: No  URINARY SYMPTOMS: Yes  GYNECOLOGIC SYMPTOMS: No  BREAST SYMPTOMS: No  SKELETAL SYMPTOMS: Yes  BLOOD SYMPTOMS: No  NERVOUS SYSTEM SYMPTOMS: Yes  MENTAL HEALTH SYMPTOMS: No  Trouble holding urine or incontinence: Yes  Pain or burning: No  Trouble starting or stopping: No  Increased frequency of urination: No  Blood in urine: No  Decreased frequency of urination: No  Frequent nighttime urination: No  Flank pain: No  Difficulty emptying bladder: No  Back pain: Yes  Muscle aches: Yes  Neck pain: Yes  Swollen joints: No  Joint pain: Yes  Bone pain: No  Muscle cramps: Yes  Muscle weakness: Yes  Joint stiffness: Yes  Bone fracture: No  Trouble with coordination: No  Dizziness or trouble with balance: Yes  Fainting or black-out spells: No  Memory loss: No  Headache: No  Seizures: No  Speech problems: No  Tingling: Yes  Tremor: No  Weakness: Yes  Difficulty walking: Yes  Paralysis: No  Numbness: Yes

## 2017-12-21 NOTE — ANESTHESIA PREPROCEDURE EVALUATION
Anesthesia Evaluation     . Pt has had prior anesthetic.     History of anesthetic complications   - PONV        ROS/MED HX    ENT/Pulmonary:  - neg pulmonary ROS     Neurologic:  - neg neurologic ROS     Cardiovascular: Comment: Palpitations associated with stress and caffeine.    (+) ----. : . . . :. dysrhythmias PVCs, Irregular Heartbeat/Palpitations, . Previous cardiac testing Echodate:11/2017results:Stress Testdate: results:ECG reviewed date: results: date: results:          METS/Exercise Tolerance:  >4 METS   Hematologic:  - neg hematologic  ROS       Musculoskeletal:   (+) , , other musculoskeletal-       GI/Hepatic:  - neg GI/hepatic ROS       Renal/Genitourinary:  - ROS Renal section negative       Endo:  - neg endo ROS       Psychiatric:  - neg psychiatric ROS       Infectious Disease:  - neg infectious disease ROS       Malignancy:      - no malignancy   Other:    - neg other ROS                 Physical Exam  Normal systems: cardiovascular, pulmonary and dental    Airway   Mallampati: I  TM distance: >3 FB  Neck ROM: limited    Dental   Comment: Permanent upper bridge    Cardiovascular   Rhythm and rate: regular and normal      Pulmonary    breath sounds clear to auscultation    Other findings: Echocardioram 11/2016    Interpretation Summary     Left ventricular systolic function is normal.  Grade II or moderate diastolic dysfunction.  No regional wall motion abnormalities noted.    Left Ventricle  The left ventricle is normal in size. There is normal left ventricular wall  thickness. Left ventricular systolic function is normal. The visual ejection  fraction is estimated at 60-65%. Grade II or moderate diastolic dysfunction.  E by E prime ratio is greater than 15, that likely suggests increased left  ventricular filling pressures. No regional wall motion abnormalities noted. A  false chord is noted (normal variant). There is prominent apical  trabeculation.          ABLATE DYSRHYTHM FOCUS RX VTACK      1.  Comprehensive EP study.   2.  3D intracardiac mapping.   3.  Repeat EP study with isoproterenol infusion.   4.  Ablation of an RV outflow tract VT.       INDICATION FOR PROCEDURE:  This is a 56-year-old white female who   presented with frequent PVCs associated with dizziness.  She was   found to have more than 37,000 PVCs in a 24 hour Holter monitoring.    The catheter ablation was recommended for concern of possible   tachycardia-induced cardiomyopathy.  Otherwise, the patient has no   apparent structural heart disease           PAC Discussion and Assessment    ASA Classification: 3 and 2  Case is suitable for: [place]  Anesthetic techniques and relevant risks discussed: GA  Invasive monitoring and risk discussed: Yes  Types:   Possibility and Risk of blood transfusion discussed: Yes  NPO instructions given:   Additional anesthetic preparation and risks discussed:   Needs early admission to pre-op area:   Other:     PAC Resident/NP Anesthesia Assessment:  Marcia Kauffman is scheduled for  on  Possible T10 to pelvis fusion with posterior osteotomies by Dr. Beckford on TBD.  PAC referral for risk assessment and optimization for anesthesia with comorbid conditions of the following:    Pre-operative considerations:  1.  Cardiac:  Functional status- METS >4. Walks 1-2 miles daily with walking stick for balance. With activity denies chest pain, dizziness or shortness of breath. high risk surgery with 0.4% (RCRI #) risk of major adverse cardiac event.        Cardiac Dysrhythmia ( right Vent. Outflow tract PVC'S) s/p ablation 2007       Echocardiogram 11/2016 EF 60-65%; Mild MR       Stress Echo 4/2012  - no stress-induced wall motion abnormality       2.  Pulm:  Airway feasible.  CAROLYNN risk: low. Patient has reports no pulmonary history or symptoms.       Limited ROM- painful with head tilting and rotating  3.  GI:  + history of PONV.  anti-emetic intervention recommended.  4.  HEME: VTE risk: 0.5%    Patient is  optimized and is acceptable candidate for the proposed procedure.  No further diagnostic evaluation is needed.     Patient also evaluated by Dr. Willams. See recommendations below.     For further details of assessment, testing, and physical exam please see H and P completed on same date.    Reviewed and Signed by PAC Mid-Level Provider/Resident  Mid-Level Provider/Resident: Violette KENNEDY CNP  Date: 12/21/2017  Time:     Attending Anesthesiologist Anesthesia Assessment:  67 year old for possible T10 to pelvis fusion with posterior osteotomies by Dr. Beckford in management of flat back syndrome and back pain. Chart reviewed, patient seen and evaluated; agree with above assessment. Patient had ablation of right ventricle outflow track PVCs, also has PONV. She has used compazine in the past for nausea, but I would suggest using agents less likely to prolong the QT interval. Also would suggest that ECG be done at the time of her H&P.    Patient is appropriate for the planned procedure without further workup or medical management change. The final anesthesia plan will be determined by the physician anesthesiologist caring for the patient on the day of surgery.    Reviewed and Signed by PAC Anesthesiologist  Anesthesiologist: suzanne  Date: 12/21/2017  Time:   Pass/Fail: Pass  Disposition:     PAC Pharmacist Assessment:        Pharmacist:   Date:   Time:                           .

## 2018-01-22 ASSESSMENT — ENCOUNTER SYMPTOMS
BACK PAIN: 1
ARTHRALGIAS: 1
LEG PAIN: 0
SLEEP DISTURBANCES DUE TO BREATHING: 0
HYPERTENSION: 0
MYALGIAS: 1
MUSCLE WEAKNESS: 0
NECK PAIN: 1
LIGHT-HEADEDNESS: 0
MUSCLE CRAMPS: 1
ORTHOPNEA: 0
STIFFNESS: 1
SYNCOPE: 0
PALPITATIONS: 1
JOINT SWELLING: 0
EXERCISE INTOLERANCE: 1
HYPOTENSION: 0

## 2018-02-01 ENCOUNTER — OFFICE VISIT (OUTPATIENT)
Dept: NEUROSURGERY | Facility: CLINIC | Age: 68
End: 2018-02-01
Payer: COMMERCIAL

## 2018-02-01 VITALS
HEART RATE: 66 BPM | WEIGHT: 128.6 LBS | HEIGHT: 64 IN | BODY MASS INDEX: 21.95 KG/M2 | SYSTOLIC BLOOD PRESSURE: 123 MMHG | DIASTOLIC BLOOD PRESSURE: 62 MMHG

## 2018-02-01 DIAGNOSIS — S32.009K PSEUDOARTHROSIS OF LUMBAR SPINE: ICD-10-CM

## 2018-02-01 DIAGNOSIS — M40.35 FLATBACK SYNDROME OF THORACOLUMBAR REGION: Primary | ICD-10-CM

## 2018-02-01 DIAGNOSIS — M81.8 OTHER OSTEOPOROSIS WITHOUT CURRENT PATHOLOGICAL FRACTURE: ICD-10-CM

## 2018-02-01 ASSESSMENT — PAIN SCALES - GENERAL: PAINLEVEL: MILD PAIN (3)

## 2018-02-01 NOTE — LETTER
2/1/2018       RE: Marcia Kauffman  [id number]  [place] [address]     Dear Colleague,    Thank you for referring your patient, Marcia Kauffman, to the [facility] NEUROSURGERY at [facility]. Please see a copy of my visit note below.        Neurosurgery Clinic Note    Chief Complaint: back pain, standing intolerance    History of Present Illness:  It was a pleasure to evaluate Marcia Kauffman in clinic today.    Marcia Kauffman is a 67 year old female presenting with low back pain and standing intolerance. Her pain is exacerbated by standing and walking and relieved minorly by root rest. She's had no significant relief with PT. She has bilateral leg aching but no radiating leg pain. Her right foot feels somewhat weak and she often trips on that foot.    She had a workup of left thigh numbness including EMG at the [facility] showing neuralgia paresthetica and she does have a tender spot or her inguinal crease on the left reproduce her pain. I previously had evaluated her while at [facility] 4 lumbar flat back deformity with significant pelvic incidence to lumbar lordosis mismatch of greater than 30 . She has a fixed deformity of her spine with no flexibility in supine imaging. 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 Leo Dixon. Likely as a secondary consequence of her sagittal spine malalignment. She also has right SI joint pain    She has long-standing neck pain exacerbated by neck extension that has been evaluated with MRI of her cervical spine does not show significant cord compression. She is aware that because of her neck pain and her resuscitation of laying prone for a long surgery to correct her spinal deformity, her neck pain might worsen. Next    The patient has osteoporosis with a T score of -3.3 and her radius on DEXA scan in  June 2017. She was started on Forteo in July 2017 and has continued that. We will be continuing that for the full 24 month duration.        Review of Systems   See end of note    Past Medical History:   Diagnosis Date     Endometriosis, site unspecified      Herpes simplex without mention of complication     HSV 2     MEDICAL HISTORY OF -     L4/5 spondololisthesis     PONV (postoperative nausea and vomiting)      Postmenopausal atrophic vaginitis 2004    Estring vag ring-stopped     Premature ventricular contractions 2007     Radial styloid tenosynovitis     arthritis in back and wrists     Spinal stenosis, lumbar region, without neurogenic claudication      Symptomatic menopausal or female climacteric states     prev on HRT-orthoprefest, effexor, paxil       Past Surgical History:   Procedure Laterality Date     ARTHROSCOPY KNEE RT/LT  1/2010    Right knee, torn meniscus     BREAST LUMPECTOMY, RT/LT  3/1991    Right benign lump     CHOLECYSTECTOMY, OPEN  1981     FUSION SPINE POSTERIOR TWO LEVELS Left 10/20/2015    Procedure: FUSION SPINE POSTERIOR TWO LEVELS;  Surgeon: Leo Dixon MD;  Location: [facility] OR     H ABLATION PVC  2007     HC EXCIS TENDON SHEATH LESION FOREARM/WRIST  2006    treatment for frozen thumb     HYSTEROSCOPY  1987     LAPAROSCOPY DIAGNOSTIC (GYN)  1984    3 total scopes for fertility     SURGICAL HISTORY OF -   09/14/95    Excision of cyst on back       Social History     Social History     Marital status:      Spouse name: N/A     Number of children: 0     Years of education: N/A     Occupational History      [employer]     nurse assistant      Retired     Social History Main Topics     Smoking status: Never Smoker     Smokeless tobacco: Never Used     Alcohol use 0.0 - 0.6 oz/week     0 - 1 Standard drinks or equivalent per week      Comment: rare     Drug use: No     Sexual activity: No     Other Topics Concern     Parent/Sibling W/ Cabg, Mi Or Angioplasty Before 65f 55m? No      Social History Narrative       family history includes Arthritis in her father, mother, and sister; Blood Disease in her paternal grandfather; Breast Cancer in her mother; CEREBROVASCULAR DISEASE in her mother; DIABETES in her father; Eye Disorder in her mother; HEART DISEASE in her mother. There is no history of Family History Negative.        IMAGING per my own measurement and interpretation:  xrays long cassette/MRI lumbar    Pelvic incidence of 65 , lumbar lordosis of 29 , grade 1 L3-4 spondylolisthesis, prior laminectomy/pedicle screws at L4-5 with halo formation, right L2-3 disc herniation with right lateral recess stenosis and central stenosis  Standing with pelvic retroversion and knee/hip flexion to compensate for flatback deformity and achieve upward stance  Pelvic tilt 39 degrees      Mr Lumbar Spine W/o & W Contrast    Result Date: 6/8/2017  MR LUMBAR SPINE WITHOUT AND WITH CONTRAST  6/8/2017 11:13 AM HISTORY: Dorsalgia, unspecified. Low back pain and left leg pain. Surgery in October 2015. COMPARISON: July 2015. TECHNIQUE:  Sagittal and axial T1 with and without contrast. 5 mL Gadavist.  Sagittal T2 and axial T2. Sagittal STIR. FINDINGS:  Five lumbar vertebrae are assumed. Again there is a transitional vertebral body which will be treated as S1 to facilitate comparison. Rudimentary disc at S1-S2. There are postsurgical changes at L4-L5, including laminectomy and posterior robert and screw fixation with metallic artifact. There is underlying grade 2 spondylolisthesis at L4-L5. There is grade 1 spondylolisthesis at L3-L4. Slight scoliosis. Small atypical hemangioma within L2 again seen. Findings by specific level: There is facet degenerative change as follows: Mild bilateral L1-L2 and L2-L3, mild/moderate bilateral L3-L4, mild left L5-S1, right L5-S1 with ankylosis. L1-L2: Mild ligamentum flavum thickening. Slight disc bulging. Borderline central stenosis and no significant foraminal stenosis. L2-L3:  Mild ligamentum flavum thickening. There is a mild broad-based disc protrusion extending from right parasagittal to right foraminal. There is some mass effect on the right L3 nerve root. There is disc bulging elsewhere and mild-moderate central stenosis. Mild bilateral foraminal stenosis. L3-L4: Disc bulging without significant overall central stenosis. The left neural foramen appears adequate. There is mild-moderate right foraminal stenosis. L4-L5: Postsurgical changes as above. No significant central stenosis. The neuroforamina appear adequate. L5-S1: Mild disc bulging. Partial anterior fusion. No stenosis. Compared with the previous exam, the L2-L3 disc protrusion and central stenosis are increased, the L3-L4 central stenosis is decreased, the L4-L5 central stenosis is decreased.     IMPRESSION: 1. Multilevel degenerative disc and facet disease. 2. Interval surgery at L4-L5. 3. L1-L2: Borderline central stenosis. 4. L2-L3: Mild right asymmetric disc protrusion with mass effect on the L3 nerve root. Mild/moderate central stenosis. JOSE CRUZ SILVA MD    Xr Spine Complete 2 Views    Result Date: 12/21/2017  Exam: Full body radiographs using EOS History: Flat back syndrome, acquired Techniques: AP and lateral images of full body and secondary images of AP and lateral views of spine were submitted for interpretation. Comparison: 7/12/2017 radiographs; 6/8/2017 lumbar spine MRI. Findings: 12 rib bearing vertebral bodies and 5 lumbar type vertebral bodies are identified. Postsurgical changes of spinal fusion instrumentation at L4-5 with grade 1 anterolisthesis, similar to prior. There is moderate to severe adjacent level degenerative disc change at the L3-L4 with associated anterolisthesis, also similar to prior. Mild reversal of the normal cervical lordosis with trace anterolisthesis of C3 on C4. Degenerative changes of cervical spine.. Coronal Deformity: There is a no substantial  coronal plane curvature of the spine. No  substantial global coronal imbalance. Sagittal Vertical Axis (A vertical line drawn from the center of C7 (farheen line) to the posterosuperior aspect of the S1 on sagittal plane):  positive Weight bearing axis: (Defined as a line drawn from the center of the femoral head to the mid aspect of the tibial plafond).     Right: Weight bearing axis crosses central 1/3 of lateral tibial plateau.      Left: Weight bearing axis crosses between tibial spines. Leg length:  (Measured from the top of the femoral head to the center of tibial plafond.  It is assumed joints are in similar degrees of extension bilaterally.  Significant difference is defined when discrepancy is greater than 1.5 cm).       No significant  leg length discrepancy. Additional Findings: Mild to moderate lateral compartment joint space narrowing in the right knee joint. Pelvic enthesopathic change. No acute osseous abnormality.  There is a nonobstructive bowel gas pattern.     Impression: 1. Stable postsurgical changes of spinal fusion at L4-5 with associated grade 1 anterolisthesis with moderate to severe adjacent level degenerative disc change and associated grade 1 anterolisthesis at L3-4. 2. Positive global sagittal imbalance. 4. Weight bearing axis as detailed above. I have personally reviewed the examination and initial interpretation and I agree with the findings. RAMONE OH      Vitamin D:  Vitamin D Deficiency Screening Results:  Lab Results   Component Value Date    VITDT 57 02/01/2016     25 OH Vit D total   Date Value Ref Range Status   05/14/2012 56 30 - 75 ug/L Final     Comment:     Season, race, dietary intake, and treatment affect the concentration of   25-hydroxy-Vitamin D. Values may decrease during winter months and increase   during summer months. Values less than 30 ug/L may indicate Vitamin D   deficiency.   Vitamin D determination is routinely performed by an immunoassay specific for   25 hydroxyvitamin D3. If an individual is  "on vitamin D2 (ergocalciferol)   supplementation, please specify 25 OH vitamin D2 and D3 level determination   by   LCMSMS. For questions, please contact the laboratory at [phone].         Nutritional Status:  Estimated body mass index is 22.07 kg/(m^2) as calculated from the following:    Height as of this encounter: 1.626 m (5' 4\").    Weight as of this encounter: 58.3 kg (128 lb 9.6 oz).    Lab Results   Component Value Date    ALBUMIN 3.7 10/12/2015       Diabetes Screening:  Lab Results   Component Value Date    A1C 5.5 01/11/2013       Nicotine Usage:    No                Physical Exam   /62 (BP Location: Right arm, Patient Position: Chair, Cuff Size: Adult Regular)  Pulse 66  Ht 1.626 m (5' 4\")  Wt 58.3 kg (128 lb 9.6 oz)  BMI 22.07 kg/m2  Constitutional: Oriented to person, place, and time. Appears well-developed and well-nourished. Cooperative. No distress.   HENT:   Head: Normocephalic and atraumatic.   Eyes: Conjunctivae are normal.  Neck: Normal range of motion. Neck supple. No spinous process tenderness and no muscular tenderness present. No tracheal deviation present.  Cardiovascular: Normal rate and regular rhythm.    Pulmonary/Chest: Effort normal and breath sounds normal.  Abdominal: Soft. Bowel sounds are normal. Exhibits no distension. There is no tenderness.   Musculoskeletal:   Cervical flexion-extension range of motion: pain with extension greater than 15 degrees  Lumbar flexion/extension range of motion: limited due to pain    Neurological: alert and oriented to person, place, and time. No cranial nerve deficit   Decreased sensation webbing right great toe    Reflex Scores:           Bicep reflexes are 3+ on the right side and 3+ on the left side.       Brachioradialis reflexes are 3+ on the right side and 3+ on the left side.       Patellar reflexes are 1+ on the right side and 1+ on the left side.       Achilles reflexes are 1+ on the right side and 1+ on the left " side.    STRENGTH LEFT RIGHT   Deltoid 5 5   Bicep 5 5   Wrist Extensor 5 5   Tricep 5 5   Finger flexion 5 5   Finger abduction 5 5    5 5       Hip Flexion     5     5   Knee Extension 5 5   Ankle Dorsiflexion 5 4   Extensor Hallucis Longus 5 3   Plantar Flexion 5 5   Foot eversion 5 5   Foot inversion 5 5     No Lhermitte's, No Spurling's  No Radha's   No ankle clonus  Able to tandem walk    Skin: Skin is warm, dry and intact.   Psychiatric: Normal mood and affect. Speech is normal and behavior is normal.        ASSESSMENT:  Marcia Kauffman is a 67 year old female with lumbar 4-5 pseudoarthrosis and spondylolisthesis, lumbar flatback deformity with greater than 30 degrees of pelvic incidence-lumbar lordosis mismatch      PLAN:  Co-surgeon approach with Dr. Aldana due to magnitude of surgery, to minimize operative time and blood loss  Surgery order placed for Stealth-guided thoracic 7 to sacral 1 posterior instrumented fusion, pelvic fixation, revision of lumbar 4-5 instrumentation, transforaminal interbody fusion with bone morphogenic protein, Oshea-Limon osteotomies lumbar 1-2, lumbar 2-3, lumbar 3-4, lumbar 4-5,  lumbar 5-S1, possible lumbar pedicle subtraction osteotomy, possible cement augmentation of screws, possible kyphoplasty, possible additional levels      I discussed surgical risk including bleeding, infection, nerve or spinal cord damage, failure to heal/failure to form fusion/instrumentation failure, CSF leak, weakness/paralysis, numbness, worsening pain or failure to improve including neuropathic pain, recurrence of problem, and potential for development of instability or adjacent segment disease, and potential need for further procedures or surgeries. I discussed potential of failure to improve or even worsening despite surgery.    I discussed 12 months recovery period to maximal improvement. I discussed that meralgia paresthetica might worsen with supine positioning for spine surgery  and might require further treatment in the future.    I discussed that she will likely need cement augmentation of her screws, a brace following surgery to help prevent proximal junctional lever arm, the potential bone growth stimulator due to her known osteoporosis. She will continue her Forteo until the entire 24 month is completed in 2019. She will need to bring her Forteo with her to the hospital and will be administering daily injections of this.    We will check serum vitamin D level to ensure no deficiencies prior to fusion surgery.    I discussed the risks of general anesthesia including stroke, heart attack, pneumonia, prolonged intubation, blood clot, pulmonary embolism, and urinary tract infection. I discussed risks of positioning including pressure ulcerations, skin tears or abrasions, pressure neuropathies, or even rarely blindness.    Patient understands and wishes to proceed. We will schedule her for the first availability for Dr. Aldana and myself as co-surgeons, on the [place] due to cardiac history    Preop PAC referral has been completed, Dr. Willams has recommended that EKG be repeated again on morning of surgery which we have ordered      I saw, evaluated, and examined the patient with the resident and personally reviewed and interpreted all imaging and labs and formulated the plan.    I spent 45 minutes in patient care with greater than 50% spent in counseling and/or coordination of care.      Again, thank you for allowing me to participate in the care of your patient.      Sincerely,    Nanette Beckford MD

## 2018-02-01 NOTE — MR AVS SNAPSHOT
After Visit Summary   2/1/2018    Marcia Kauffman    MRN: [id number]           Patient Information     Date Of Birth          1950        Visit Information        Provider Department      2/1/2018 10:00 AM Nanette Beckford MD [facility] Neurosurgery        Today's Diagnoses     Flatback syndrome of thoracolumbar region    -  1    Pseudoarthrosis of lumbar spine        Other osteoporosis without current pathological fracture           Follow-ups after your visit        Who to contact     Please call your clinic at [phone] to:    Ask questions about your health    Make or cancel appointments    Discuss your medicines    Learn about your test results    Speak to your doctor   If you have compliments or concerns about an experience at your clinic, or if you wish to file a complaint, please contact [facility] Patient Relations at [phone] or email us at [email or web address]         Additional Information About Your Visit        MyChart Information     [facility] gives you secure access to your electronic health record. If you see a primary care provider, you can also send messages to your care team and make appointments. If you have questions, please call your primary care clinic.  If you do not have a primary care provider, please call [phone] and they will assist you.      [employer] [facility] is an electronic gateway that provides easy, online access to your medical records. With [employer] [facility] [employer], you can request a clinic appointment, read your test results, renew a prescription or communicate with your care team.     To access your existing account, please contact your [facility] Clinic or call [phone] for assistance.        Care EveryWhere ID     This is your Care EveryWhere ID. This could be used by other organizations to access your [facility] medical records  [id number]        Your Vitals Were     Pulse Height BMI (Body Mass  "Index)             66 1.626 m (5' 4\") 22.07 kg/m2          Blood Pressure from Last 3 Encounters:   02/01/18 123/62   12/21/17 108/62   11/15/16 116/57    Weight from Last 3 Encounters:   02/01/18 58.3 kg (128 lb 9.6 oz)   12/21/17 58.4 kg (128 lb 12.8 oz)   12/21/17 58.4 kg (128 lb 12.8 oz)              Today, you had the following     No orders found for display         Today's Medication Changes          These changes are accurate as of 2/1/18 10:49 AM.  If you have any questions, ask your nurse or doctor.               These medicines have changed or have updated prescriptions.        Dose/Directions    montelukast 10 MG tablet   Commonly known as:  SINGULAIR   This may have changed:    - when to take this  - reasons to take this   Used for:  Allergic urticaria        Dose:  10 mg   Take 1 tablet (10 mg) by mouth daily   Quantity:  30 tablet   Refills:  11                Primary Care Provider    None Specified       No primary provider on file.        Equal Access to Services     [facility] : Marisel Moore, nitesh crowe, qagisella wray, dora bhatti . So [facility] [phone].    ATENCIÓN: Si habla español, tiene a montez disposición servicios gratuitos de asistencia lingüística. Llame al [phone].    We comply with applicable federal civil rights laws and [place] laws. We do not discriminate on the basis of race, color, national origin, age, disability, sex, sexual orientation, or gender identity.            Thank you!     Thank you for choosing [facility]  for your care. Our goal is always to provide you with excellent care. Hearing back from our patients is one way we can continue to improve our services. Please take a few minutes to complete the written survey that you may receive in the mail after your visit with us. Thank you!             Your Updated Medication List - Protect others around you: Learn how to safely use, store and throw " away your medicines at www.disposemymeds.org.          This list is accurate as of 2/1/18 10:49 AM.  Always use your most recent med list.                   Brand Name Dispense Instructions for use Diagnosis    ascorbic acid 500 MG tablet    VITAMIN C     1 tab daily AT BREAKFAST        CO Q 10 PO      Take 1 chew tab by mouth every morning        DAILY MULTI VITAMIN/MINERALS PO      1 TABLET DAILY AT BREAKFAST        FLAX SEED OIL PO      1 heaping tsp daily on cereal        glucosamine chondroitin 500 complex Caps      1 capsule daily at noon        ibuprofen 200 MG tablet    ADVIL/MOTRIN     Take 200 mg by mouth daily        montelukast 10 MG tablet    SINGULAIR    30 tablet    Take 1 tablet (10 mg) by mouth daily    Allergic urticaria       nabumetone 500 MG tablet    RELAFEN     Take 500 mg by mouth as needed        NONFORMULARY      Take 1 capsule by mouth nightly as needed Sleep [place]?  features l-theanine and taurine to help relaxation and sleep        OMEGA-3 FISH OIL PO      Take 1,200 mg by mouth 2 times daily (with meals)        UNABLE TO FIND      Apply 0.25 tsp. topically daily MEDICATION NAME: Bio- estrol, plant based hormone replacement therapy        * UNCLASSIFIED OTC PRODUCT Misc      Calcium AEP 1-2 capsules by mouth daily ?Supports cell membranes* BID        * UNCLASSIFIED OTC PRODUCT Misc      RelaxaMag? 1 capsule at bedtime as needed -  a unique magnesium supplement that helps improve sleep and stress tolerance in the evening        valACYclovir 500 MG tablet    VALTREX    30 tablet    Take 1 tablet (500 mg) by mouth 2 times daily For 3 days when flares occur    Herpes simplex virus (HSV) infection       * Notice:  This list has 2 medication(s) that are the same as other medications prescribed for you. Read the directions carefully, and ask your doctor or other care provider to review them with you.

## 2018-02-01 NOTE — PROGRESS NOTES
Stealth-guided thoracic 7 to sacral 1 posterior instrumented fusion, pelvic fixation, revision of lumbar 4-5 instrumentation, transforaminal interbody fusion with bone morphogenic protein, Oshea-Limon osteotomies lumbar 1-2, lumbar 2-3, lumbar 3-4, lumbar 4-5,  lumbar 5-S1, possible lumbar pedicle subtraction osteotomy, possible cement augmentation of screws, possible kyphoplasty, possible additional levels  Answers for HPI/ROS submitted by the patient on 1/22/2018   General Symptoms: No  Skin Symptoms: No  HENT Symptoms: No  EYE SYMPTOMS: No  HEART SYMPTOMS: Yes  LUNG SYMPTOMS: No  INTESTINAL SYMPTOMS: No  URINARY SYMPTOMS: No  GYNECOLOGIC SYMPTOMS: No  BREAST SYMPTOMS: No  SKELETAL SYMPTOMS: Yes  BLOOD SYMPTOMS: No  NERVOUS SYSTEM SYMPTOMS: No  MENTAL HEALTH SYMPTOMS: No  Chest pain or pressure: No  Fast or irregular heartbeat: Yes  Pain in legs with walking: No  Trouble breathing while lying down: No  Fingers or toes appear blue: No  High blood pressure: No  Low blood pressure: No  Fainting: No  Murmurs: No  Pacemaker: No  Varicose veins: No  Edema or swelling: No  Wake up at night with shortness of breath: No  Light-headedness: No  Exercise intolerance: Yes  Back pain: Yes  Muscle aches: Yes  Neck pain: Yes  Swollen joints: No  Joint pain: Yes  Bone pain: Yes  Muscle cramps: Yes  Muscle weakness: No  Joint stiffness: Yes  Bone fracture: No

## 2018-02-01 NOTE — PROGRESS NOTES
Neurosurgery Clinic Note    Chief Complaint: back pain, standing intolerance    History of Present Illness:  It was a pleasure to evaluate Marcia Kauffman in clinic today.    Marcia Kauffman is a 67 year old female presenting with low back pain and standing intolerance. Her pain is exacerbated by standing and walking and relieved minorly by root rest. She's had no significant relief with PT. She has bilateral leg aching but no radiating leg pain. Her right foot feels somewhat weak and she often trips on that foot.    She had a workup of left thigh numbness including EMG at the [facility] showing neuralgia paresthetica and she does have a tender spot or her inguinal crease on the left reproduce her pain. I previously had evaluated her while at [facility] 4 lumbar flat back deformity with significant pelvic incidence to lumbar lordosis mismatch of greater than 30 . She has a fixed deformity of her spine with no flexibility in supine imaging. 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 Leo Dixon. Likely as a secondary consequence of her sagittal spine malalignment. She also has right SI joint pain    She has long-standing neck pain exacerbated by neck extension that has been evaluated with MRI of her cervical spine does not show significant cord compression. She is aware that because of her neck pain and her resuscitation of laying prone for a long surgery to correct her spinal deformity, her neck pain might worsen. Next    The patient has osteoporosis with a T score of -3.3 and her radius on DEXA scan in June 2017. She was started on Forteo in July 2017 and has continued that. We will be continuing that for the full 24 month duration.        Review of Systems   See end of note    Past Medical History:   Diagnosis Date     Endometriosis, site unspecified      Herpes simplex without  mention of complication     HSV 2     MEDICAL HISTORY OF -     L4/5 spondololisthesis     PONV (postoperative nausea and vomiting)      Postmenopausal atrophic vaginitis 2004    Estring vag ring-stopped     Premature ventricular contractions 2007     Radial styloid tenosynovitis     arthritis in back and wrists     Spinal stenosis, lumbar region, without neurogenic claudication      Symptomatic menopausal or female climacteric states     prev on HRT-orthoprefest, effexor, paxil       Past Surgical History:   Procedure Laterality Date     ARTHROSCOPY KNEE RT/LT  1/2010    Right knee, torn meniscus     BREAST LUMPECTOMY, RT/LT  3/1991    Right benign lump     CHOLECYSTECTOMY, OPEN  1981     FUSION SPINE POSTERIOR TWO LEVELS Left 10/20/2015    Procedure: FUSION SPINE POSTERIOR TWO LEVELS;  Surgeon: Leo Dixon MD;  Location: [facility] OR     H ABLATION PVC  2007     HC EXCIS TENDON SHEATH LESION FOREARM/WRIST  2006    treatment for frozen thumb     HYSTEROSCOPY  1987     LAPAROSCOPY DIAGNOSTIC (GYN)  1984    3 total scopes for fertility     SURGICAL HISTORY OF -   09/14/95    Excision of cyst on back       Social History     Social History     Marital status:      Spouse name: N/A     Number of children: 0     Years of education: N/A     Occupational History      [employer]     nurse assistant      Retired     Social History Main Topics     Smoking status: Never Smoker     Smokeless tobacco: Never Used     Alcohol use 0.0 - 0.6 oz/week     0 - 1 Standard drinks or equivalent per week      Comment: rare     Drug use: No     Sexual activity: No     Other Topics Concern     Parent/Sibling W/ Cabg, Mi Or Angioplasty Before 65f 55m? No     Social History Narrative       family history includes Arthritis in her father, mother, and sister; Blood Disease in her paternal grandfather; Breast Cancer in her mother; CEREBROVASCULAR DISEASE in her mother; DIABETES in her father; Eye Disorder in her mother; HEART DISEASE in her  mother. There is no history of Family History Negative.        IMAGING per my own measurement and interpretation:  xrays long cassette/MRI lumbar    Pelvic incidence of 65 , lumbar lordosis of 29 , grade 1 L3-4 spondylolisthesis, prior laminectomy/pedicle screws at L4-5 with halo formation, right L2-3 disc herniation with right lateral recess stenosis and central stenosis  Standing with pelvic retroversion and knee/hip flexion to compensate for flatback deformity and achieve upward stance  Pelvic tilt 39 degrees      Mr Lumbar Spine W/o & W Contrast    Result Date: 6/8/2017  MR LUMBAR SPINE WITHOUT AND WITH CONTRAST  6/8/2017 11:13 AM HISTORY: Dorsalgia, unspecified. Low back pain and left leg pain. Surgery in October 2015. COMPARISON: July 2015. TECHNIQUE:  Sagittal and axial T1 with and without contrast. 5 mL Gadavist.  Sagittal T2 and axial T2. Sagittal STIR. FINDINGS:  Five lumbar vertebrae are assumed. Again there is a transitional vertebral body which will be treated as S1 to facilitate comparison. Rudimentary disc at S1-S2. There are postsurgical changes at L4-L5, including laminectomy and posterior robert and screw fixation with metallic artifact. There is underlying grade 2 spondylolisthesis at L4-L5. There is grade 1 spondylolisthesis at L3-L4. Slight scoliosis. Small atypical hemangioma within L2 again seen. Findings by specific level: There is facet degenerative change as follows: Mild bilateral L1-L2 and L2-L3, mild/moderate bilateral L3-L4, mild left L5-S1, right L5-S1 with ankylosis. L1-L2: Mild ligamentum flavum thickening. Slight disc bulging. Borderline central stenosis and no significant foraminal stenosis. L2-L3: Mild ligamentum flavum thickening. There is a mild broad-based disc protrusion extending from right parasagittal to right foraminal. There is some mass effect on the right L3 nerve root. There is disc bulging elsewhere and mild-moderate central stenosis. Mild bilateral foraminal stenosis.  L3-L4: Disc bulging without significant overall central stenosis. The left neural foramen appears adequate. There is mild-moderate right foraminal stenosis. L4-L5: Postsurgical changes as above. No significant central stenosis. The neuroforamina appear adequate. L5-S1: Mild disc bulging. Partial anterior fusion. No stenosis. Compared with the previous exam, the L2-L3 disc protrusion and central stenosis are increased, the L3-L4 central stenosis is decreased, the L4-L5 central stenosis is decreased.     IMPRESSION: 1. Multilevel degenerative disc and facet disease. 2. Interval surgery at L4-L5. 3. L1-L2: Borderline central stenosis. 4. L2-L3: Mild right asymmetric disc protrusion with mass effect on the L3 nerve root. Mild/moderate central stenosis. JOSE CRUZ SILVA MD    Xr Spine Complete 2 Views    Result Date: 12/21/2017  Exam: Full body radiographs using EOS History: Flat back syndrome, acquired Techniques: AP and lateral images of full body and secondary images of AP and lateral views of spine were submitted for interpretation. Comparison: 7/12/2017 radiographs; 6/8/2017 lumbar spine MRI. Findings: 12 rib bearing vertebral bodies and 5 lumbar type vertebral bodies are identified. Postsurgical changes of spinal fusion instrumentation at L4-5 with grade 1 anterolisthesis, similar to prior. There is moderate to severe adjacent level degenerative disc change at the L3-L4 with associated anterolisthesis, also similar to prior. Mild reversal of the normal cervical lordosis with trace anterolisthesis of C3 on C4. Degenerative changes of cervical spine.. Coronal Deformity: There is a no substantial  coronal plane curvature of the spine. No substantial global coronal imbalance. Sagittal Vertical Axis (A vertical line drawn from the center of C7 (farheen line) to the posterosuperior aspect of the S1 on sagittal plane):  positive Weight bearing axis: (Defined as a line drawn from the center of the femoral head to the mid aspect  of the tibial plafond).     Right: Weight bearing axis crosses central 1/3 of lateral tibial plateau.      Left: Weight bearing axis crosses between tibial spines. Leg length:  (Measured from the top of the femoral head to the center of tibial plafond.  It is assumed joints are in similar degrees of extension bilaterally.  Significant difference is defined when discrepancy is greater than 1.5 cm).       No significant  leg length discrepancy. Additional Findings: Mild to moderate lateral compartment joint space narrowing in the right knee joint. Pelvic enthesopathic change. No acute osseous abnormality.  There is a nonobstructive bowel gas pattern.     Impression: 1. Stable postsurgical changes of spinal fusion at L4-5 with associated grade 1 anterolisthesis with moderate to severe adjacent level degenerative disc change and associated grade 1 anterolisthesis at L3-4. 2. Positive global sagittal imbalance. 4. Weight bearing axis as detailed above. I have personally reviewed the examination and initial interpretation and I agree with the findings. RAMONE OH      Vitamin D:  Vitamin D Deficiency Screening Results:  Lab Results   Component Value Date    VITDT 57 02/01/2016     25 OH Vit D total   Date Value Ref Range Status   05/14/2012 56 30 - 75 ug/L Final     Comment:     Season, race, dietary intake, and treatment affect the concentration of   25-hydroxy-Vitamin D. Values may decrease during winter months and increase   during summer months. Values less than 30 ug/L may indicate Vitamin D   deficiency.   Vitamin D determination is routinely performed by an immunoassay specific for   25 hydroxyvitamin D3. If an individual is on vitamin D2 (ergocalciferol)   supplementation, please specify 25 OH vitamin D2 and D3 level determination   by   LCMSMS. For questions, please contact the laboratory at [phone].         Nutritional Status:  Estimated body mass index is 22.07 kg/(m^2) as calculated from the  "following:    Height as of this encounter: 1.626 m (5' 4\").    Weight as of this encounter: 58.3 kg (128 lb 9.6 oz).    Lab Results   Component Value Date    ALBUMIN 3.7 10/12/2015       Diabetes Screening:  Lab Results   Component Value Date    A1C 5.5 01/11/2013       Nicotine Usage:    No                Physical Exam   /62 (BP Location: Right arm, Patient Position: Chair, Cuff Size: Adult Regular)  Pulse 66  Ht 1.626 m (5' 4\")  Wt 58.3 kg (128 lb 9.6 oz)  BMI 22.07 kg/m2  Constitutional: Oriented to person, place, and time. Appears well-developed and well-nourished. Cooperative. No distress.   HENT:   Head: Normocephalic and atraumatic.   Eyes: Conjunctivae are normal.  Neck: Normal range of motion. Neck supple. No spinous process tenderness and no muscular tenderness present. No tracheal deviation present.  Cardiovascular: Normal rate and regular rhythm.    Pulmonary/Chest: Effort normal and breath sounds normal.  Abdominal: Soft. Bowel sounds are normal. Exhibits no distension. There is no tenderness.   Musculoskeletal:   Cervical flexion-extension range of motion: pain with extension greater than 15 degrees  Lumbar flexion/extension range of motion: limited due to pain    Neurological: alert and oriented to person, place, and time. No cranial nerve deficit   Decreased sensation webbing right great toe    Reflex Scores:           Bicep reflexes are 3+ on the right side and 3+ on the left side.       Brachioradialis reflexes are 3+ on the right side and 3+ on the left side.       Patellar reflexes are 1+ on the right side and 1+ on the left side.       Achilles reflexes are 1+ on the right side and 1+ on the left side.    STRENGTH LEFT RIGHT   Deltoid 5 5   Bicep 5 5   Wrist Extensor 5 5   Tricep 5 5   Finger flexion 5 5   Finger abduction 5 5    5 5       Hip Flexion     5     5   Knee Extension 5 5   Ankle Dorsiflexion 5 4   Extensor Hallucis Longus 5 3   Plantar Flexion 5 5   Foot eversion 5 5 "   Foot inversion 5 5     No Lhermitte's, No Spurling's  No Radha's   No ankle clonus  Able to tandem walk    Skin: Skin is warm, dry and intact.   Psychiatric: Normal mood and affect. Speech is normal and behavior is normal.        ASSESSMENT:  Marcia Kauffman is a 67 year old female with lumbar 4-5 pseudoarthrosis and spondylolisthesis, lumbar flatback deformity with greater than 30 degrees of pelvic incidence-lumbar lordosis mismatch      PLAN:  Co-surgeon approach with Dr. Aldana due to magnitude of surgery, to minimize operative time and blood loss  Surgery order placed for Stealth-guided thoracic 7 to sacral 1 posterior instrumented fusion, pelvic fixation, revision of lumbar 4-5 instrumentation, transforaminal interbody fusion with bone morphogenic protein, Oshea-Limon osteotomies lumbar 1-2, lumbar 2-3, lumbar 3-4, lumbar 4-5,  lumbar 5-S1, possible lumbar pedicle subtraction osteotomy, possible cement augmentation of screws, possible kyphoplasty, possible additional levels      I discussed surgical risk including bleeding, infection, nerve or spinal cord damage, failure to heal/failure to form fusion/instrumentation failure, CSF leak, weakness/paralysis, numbness, worsening pain or failure to improve including neuropathic pain, recurrence of problem, and potential for development of instability or adjacent segment disease, and potential need for further procedures or surgeries. I discussed potential of failure to improve or even worsening despite surgery.    I discussed 12 months recovery period to maximal improvement. I discussed that meralgia paresthetica might worsen with supine positioning for spine surgery and might require further treatment in the future.    I discussed that she will likely need cement augmentation of her screws, a brace following surgery to help prevent proximal junctional lever arm, the potential bone growth stimulator due to her known osteoporosis. She will continue her  Forteo until the entire 24 month is completed in 2019. She will need to bring her Forteo with her to the hospital and will be administering daily injections of this.    We will check serum vitamin D level to ensure no deficiencies prior to fusion surgery.    I discussed the risks of general anesthesia including stroke, heart attack, pneumonia, prolonged intubation, blood clot, pulmonary embolism, and urinary tract infection. I discussed risks of positioning including pressure ulcerations, skin tears or abrasions, pressure neuropathies, or even rarely blindness.    Patient understands and wishes to proceed. We will schedule her for the first availability for Dr. Aldana and myself as co-surgeons, on the [place] due to cardiac history    Preop PAC referral has been completed, Dr. Willams has recommended that EKG be repeated again on morning of surgery which we have ordered        Nanette Beckford MD    [facility] Department of Neurosurgery  Complex Spinal Deformity, Scoliosis, and Minimally Invasive Spine Surgery Specialist  Office: [phone]    2/1/2018  12:40 PM    I saw, evaluated, and examined the patient with the resident and personally reviewed and interpreted all imaging and labs and formulated the plan.    I spent 45 minutes in patient care with greater than 50% spent in counseling and/or coordination of care.    Answers for HPI/ROS submitted by the patient on 1/22/2018   General Symptoms: No  Skin Symptoms: No  HENT Symptoms: No  EYE SYMPTOMS: No  HEART SYMPTOMS: Yes  LUNG SYMPTOMS: No  INTESTINAL SYMPTOMS: No  URINARY SYMPTOMS: No  GYNECOLOGIC SYMPTOMS: No  BREAST SYMPTOMS: No  SKELETAL SYMPTOMS: Yes  BLOOD SYMPTOMS: No  NERVOUS SYSTEM SYMPTOMS: No  MENTAL HEALTH SYMPTOMS: No  Chest pain or pressure: No  Fast or irregular heartbeat: Yes  Pain in legs with walking: No  Trouble breathing while lying down: No  Fingers or toes appear blue: No  High blood pressure: No  Low blood  pressure: No  Fainting: No  Murmurs: No  Pacemaker: No  Varicose veins: No  Edema or swelling: No  Wake up at night with shortness of breath: No  Light-headedness: No  Exercise intolerance: Yes  Back pain: Yes  Muscle aches: Yes  Neck pain: Yes  Swollen joints: No  Joint pain: Yes  Bone pain: Yes  Muscle cramps: Yes  Muscle weakness: No  Joint stiffness: Yes  Bone fracture: No

## 2018-02-08 DIAGNOSIS — I44.2 COMPLETE HEART BLOCK (H): Primary | ICD-10-CM

## 2018-05-07 ENCOUNTER — OFFICE VISIT (OUTPATIENT)
Dept: SURGERY | Facility: CLINIC | Age: 68
End: 2018-05-07
Payer: COMMERCIAL

## 2018-05-07 ENCOUNTER — ANESTHESIA EVENT (OUTPATIENT)
Dept: SURGERY | Facility: CLINIC | Age: 68
DRG: 454 | End: 2018-05-07
Payer: COMMERCIAL

## 2018-05-07 ENCOUNTER — ALLIED HEALTH/NURSE VISIT (OUTPATIENT)
Dept: SURGERY | Facility: CLINIC | Age: 68
End: 2018-05-07
Payer: COMMERCIAL

## 2018-05-07 ENCOUNTER — APPOINTMENT (OUTPATIENT)
Dept: SURGERY | Facility: CLINIC | Age: 68
End: 2018-05-07
Payer: COMMERCIAL

## 2018-05-07 VITALS
SYSTOLIC BLOOD PRESSURE: 132 MMHG | DIASTOLIC BLOOD PRESSURE: 73 MMHG | OXYGEN SATURATION: 98 % | TEMPERATURE: 98 F | WEIGHT: 126.8 LBS | HEIGHT: 64 IN | BODY MASS INDEX: 21.65 KG/M2 | RESPIRATION RATE: 18 BRPM | HEART RATE: 65 BPM

## 2018-05-07 DIAGNOSIS — M40.30 FLAT BACK SYNDROME: Primary | ICD-10-CM

## 2018-05-07 DIAGNOSIS — M40.30 FLAT BACK SYNDROME: ICD-10-CM

## 2018-05-07 ASSESSMENT — LIFESTYLE VARIABLES: TOBACCO_USE: 0

## 2018-05-07 ASSESSMENT — ENCOUNTER SYMPTOMS: DYSRHYTHMIAS: 1

## 2018-05-07 NOTE — PATIENT INSTRUCTIONS
Preparing for Your Surgery      Name:  Marcia Kauffman   MRN:  [id number]   :  1950   Today's Date:  2018     Arriving for surgery:  Surgery date:  18  Arrival time:  5:30 am  Please come to:       [facility] [address]  [address] [place]  [address]    -   parking is available in front of the hospital from 5:15 am to 8:00 pm    -  Stop at the Information Desk in the lobby    -   Inform the information person that you are here for surgery. An escort to 3c will be provided. If you would not like an escort, please proceed to 3C on the 3rd floor. [phone]     What can I eat or drink?  -  You may have solid food or milk products until 8 hours prior to your surgery.  Stop 11:30 pm  18  -  You may have water,gatorade, apple juice or 7up/Sprite until 2 hours prior to your surgery. Stop 5:30 am  18  Which medicines can I take?         Stop aspirin products today, stop advil/ibuprofen 3 days before surgery.       Stop vitamins and supplements today.       Continue to hold nabumetone.    -  Please take these medications the day of surgery: 18          singulair if needed        forteo  Be sure to bring your forteo to the hospital            How do I prepare myself?  -  Take two showers: one the night before surgery; and one the morning of surgery.         Use Scrubcare or Hibiclens to wash from neck down.  You may use your own shampoo and conditioner. No other hair products.   -  Do NOT use lotion, powder, deodorant, or antiperspirant the day of your surgery.  -  Do NOT wear any makeup, fingernail polish or jewelry.  -  Begin using Incentive Spirometer 1 week prior to surgery.  Use 4 times per day, up to 5 breaths each time.  Bring Incentive Spirometer to hospital.  -Do not bring your own medications to the hospital, except for inhalers and eye drops.  -  Bring your ID and insurance card.    Questions or Concerns:  If you have questions  or concerns regarding the day of surgery, please call the Preoperative Assessment Center (PAC), Monday-Friday 7AM-7PM:  [phone].  After surgery please call your surgeons office.   Using an Incentive Spirometer    An incentive spirometer is a device that helps you do deep breathing exercises. These exercises expand your lungs, aid in circulation, and help prevent pneumonia. Deep breathing exercises also help you breathe better and improve the function of your lungs by:    Keeping your lungs clear    Strengthening your breathing muscles    Helping prevent respiratory complications or problems  The incentive spirometer gives you a way to take an active part in your care. A nurse or therapist will teach you breathing exercises. To do these exercises, you will breathe in through your mouth and not your nose. The incentive spirometer only works correctly if you breathe in through your mouth.    Steps to clear lungs  Step 1. Exhale normally. Then, inhale normally.    Relax and breathe out.  Step 2. Place your lips tightly around the mouthpiece.    Make sure the device is upright and not tilted.  Step 3. Inhale as much air as you can through the mouthpiece (don't breath through your nose).    Inhale slowly and deeply.    Hold your breath long enough to keep the balls or disk raised for at least 3 to 5 seconds, or as instructed by your healthcare provider.  Step 4. Repeat the exercise regularly.    Begin using the Incentive Spirometer one week prior to your surgery, 4 times per day-5 times each.  AFTER YOUR SURGERY  Breathing exercises   Breathing exercises help you recover faster. Take deep breaths and let the air out slowly. This will:     Help you wake up after surgery.    Help prevent complications like pneumonia.  Preventing complications will help you go home sooner.   We may give you a breathing device (incentive spirometer) to encourage you to breathe deeply.   Nausea and vomiting   You may feel sick to your  stomach after surgery; if so, let your nurse know.    Pain control:  After surgery, you may have pain. Our goal is to help you manage your pain. Pain medicine will help you feel comfortable enough to do activities that will help you heal.  These activities may include breathing exercises, walking and physical therapy.   To help your health care team treat your pain we will ask: 1) If you have pain  2) where it is located 3) describe your pain in your words  Methods of pain control include medications given by mouth, vein or by nerve block for some surgeries.  We may give you a pain control pump that will:  1) Deliver the medicine through a tube placed in your vein  2) Control the amount of medicine you receive  3) Allow you to push a button to deliver a dose of pain medicine  Sequential Compression Device (SCD) or Pneumo Boots:  You may need to wear SCD S on your legs or feet. These are wraps connected to a machine that pumps in air and releases it. The repeated pumping helps prevent blood clots from forming.

## 2018-05-07 NOTE — MR AVS SNAPSHOT
After Visit Summary   2018    Marcia Kauffman    MRN: [id number]           Patient Information     Date Of Birth          1950        Visit Information        Provider Department      2018 11:30 AM Rn, [facility]        Care Instructions    Preparing for Your Surgery      Name:  Marcia Kauffman   MRN:  [id number]   :  1950   Today's Date:  2018     Arriving for surgery:  Surgery date:  18  Arrival time:  5:30 am  Please come to:       [facility] [address]  [address] [place]  [address]    -   parking is available in front of the hospital from 5:15 am to 8:00 pm    -  Stop at the Information Desk in the lobby    -   Inform the information person that you are here for surgery. An escort to 3c will be provided. If you would not like an escort, please proceed to 3C on the 3rd floor. [phone]     What can I eat or drink?  -  You may have solid food or milk products until 8 hours prior to your surgery.  Stop 11:30 pm  18  -  You may have water,gatorade, apple juice or 7up/Sprite until 2 hours prior to your surgery. Stop 5:30 am  18  Which medicines can I take?         Stop aspirin products today, stop advil/ibuprofen 3 days before surgery.       Stop vitamins and supplements today.       Continue to hold nabumetone.    -  Please take these medications the day of surgery: 18          singulair if needed        forteo  Be sure to bring your forteo to the hospital            How do I prepare myself?  -  Take two showers: one the night before surgery; and one the morning of surgery.         Use Scrubcare or Hibiclens to wash from neck down.  You may use your own shampoo and conditioner. No other hair products.   -  Do NOT use lotion, powder, deodorant, or antiperspirant the day of your surgery.  -  Do NOT wear any makeup, fingernail polish or jewelry.  -  Begin using  Incentive Spirometer 1 week prior to surgery.  Use 4 times per day, up to 5 breaths each time.  Bring Incentive Spirometer to hospital.  -Do not bring your own medications to the hospital, except for inhalers and eye drops.  -  Bring your ID and insurance card.    Questions or Concerns:  If you have questions or concerns regarding the day of surgery, please call the Preoperative Assessment Center (PAC), Monday-Friday 7AM-7PM:  [phone].  After surgery please call your surgeons office.   Using an Incentive Spirometer    An incentive spirometer is a device that helps you do deep breathing exercises. These exercises expand your lungs, aid in circulation, and help prevent pneumonia. Deep breathing exercises also help you breathe better and improve the function of your lungs by:    Keeping your lungs clear    Strengthening your breathing muscles    Helping prevent respiratory complications or problems  The incentive spirometer gives you a way to take an active part in your care. A nurse or therapist will teach you breathing exercises. To do these exercises, you will breathe in through your mouth and not your nose. The incentive spirometer only works correctly if you breathe in through your mouth.    Steps to clear lungs  Step 1. Exhale normally. Then, inhale normally.    Relax and breathe out.  Step 2. Place your lips tightly around the mouthpiece.    Make sure the device is upright and not tilted.  Step 3. Inhale as much air as you can through the mouthpiece (don't breath through your nose).    Inhale slowly and deeply.    Hold your breath long enough to keep the balls or disk raised for at least 3 to 5 seconds, or as instructed by your healthcare provider.  Step 4. Repeat the exercise regularly.    Begin using the Incentive Spirometer one week prior to your surgery, 4 times per day-5 times each.  AFTER YOUR SURGERY  Breathing exercises   Breathing exercises help you recover faster. Take deep breaths and let the air  out slowly. This will:     Help you wake up after surgery.    Help prevent complications like pneumonia.  Preventing complications will help you go home sooner.   We may give you a breathing device (incentive spirometer) to encourage you to breathe deeply.   Nausea and vomiting   You may feel sick to your stomach after surgery; if so, let your nurse know.    Pain control:  After surgery, you may have pain. Our goal is to help you manage your pain. Pain medicine will help you feel comfortable enough to do activities that will help you heal.  These activities may include breathing exercises, walking and physical therapy.   To help your health care team treat your pain we will ask: 1) If you have pain  2) where it is located 3) describe your pain in your words  Methods of pain control include medications given by mouth, vein or by nerve block for some surgeries.  We may give you a pain control pump that will:  1) Deliver the medicine through a tube placed in your vein  2) Control the amount of medicine you receive  3) Allow you to push a button to deliver a dose of pain medicine  Sequential Compression Device (SCD) or Pneumo Boots:  You may need to wear SCD S on your legs or feet. These are wraps connected to a machine that pumps in air and releases it. The repeated pumping helps prevent blood clots from forming.                     Follow-ups after your visit        Your next 10 appointments already scheduled     May 07, 2018 12:00 PM CDT   (Arrive by 11:45 AM)   PAC Anesthesia Consult with  Pac Anesthesiologist   [facility] ([facility])    [address]  [address] [facility] [id number] [phone]            May 14, 2018   Procedure with Nanette Beckford MD   [facility] [place], Same Day Surgery (--)    [address] [id number] [phone]              Who to contact     Please call your clinic at [phone] to:    Ask questions  about your health    Make or cancel appointments    Discuss your medicines    Learn about your test results    Speak to your doctor            Additional Information About Your Visit        [employer] Information     [employer] gives you secure access to your electronic health record. If you see a primary care provider, you can also send messages to your care team and make appointments. If you have questions, please call your primary care clinic.  If you do not have a primary care provider, please call [phone] and they will assist you.      [employer] is an electronic gateway that provides easy, online access to your medical records. With [employer], you can request a clinic appointment, read your test results, renew a prescription or communicate with your care team.     To access your existing account, please contact your [facility] or call [phone] for assistance.        Care EveryWhere ID     This is your Care EveryWhere ID. This could be used by other organizations to access your [facility] medical records  [id number]         Blood Pressure from Last 3 Encounters:   05/07/18 132/73   02/01/18 123/62   12/21/17 108/62    Weight from Last 3 Encounters:   05/07/18 57.5 kg (126 lb 12.8 oz)   02/01/18 58.3 kg (128 lb 9.6 oz)   12/21/17 58.4 kg (128 lb 12.8 oz)              Today, you had the following     No orders found for display         Today's Medication Changes          These changes are accurate as of 5/7/18 11:43 AM.  If you have any questions, ask your nurse or doctor.               These medicines have changed or have updated prescriptions.        Dose/Directions    montelukast 10 MG tablet   Commonly known as:  SINGULAIR   This may have changed:    - when to take this  - reasons to take this   Used for:  Allergic urticaria        Dose:  10 mg   Take 1 tablet (10 mg) by mouth daily   Quantity:  30 tablet   Refills:  11       valACYclovir 500 MG tablet   Commonly known as:   VALTREX   This may have changed:    - when to take this  - additional instructions   Used for:  Herpes simplex virus (HSV) infection        Dose:  500 mg   Take 1 tablet (500 mg) by mouth 2 times daily For 3 days when flares occur   Quantity:  30 tablet   Refills:  3                Primary Care Provider    None Specified       No primary provider on file.        Equal Access to Services     [facility] KENN : Hadii aad ku hadcarleeconstantin Moore, wastephanieda luqadaha, qaybta kaalmada kamala, dora shafferdevenwilman rush. So [facility] [phone].    ATENCIÓN: Si habla español, tiene a montez disposición servicios gratuitos de asistencia lingüística. Llame al [phone].    We comply with applicable federal civil rights laws and [place] laws. We do not discriminate on the basis of race, color, national origin, age, disability, sex, sexual orientation, or gender identity.            Thank you!     Thank you for choosing [facility]  for your care. Our goal is always to provide you with excellent care. Hearing back from our patients is one way we can continue to improve our services. Please take a few minutes to complete the written survey that you may receive in the mail after your visit with us. Thank you!             Your Updated Medication List - Protect others around you: Learn how to safely use, store and throw away your medicines at www.disposemymeds.org.          This list is accurate as of 5/7/18 11:43 AM.  Always use your most recent med list.                   Brand Name Dispense Instructions for use Diagnosis    ascorbic acid 500 MG tablet    VITAMIN C     2 tab daily AT BREAKFAST        CO Q 10 PO      Take 1 chew tab by mouth every morning        DAILY MULTI VITAMIN/MINERALS PO      1 TABLET DAILY AT BREAKFAST ON HOLD FOR SURGERY SINCE 05/01/2018        FLAX SEED OIL PO      takes in her cereal. STIOPPED 05/01/2018        FORTEO 600 MCG/2.4ML Soln injection   Generic drug:   teriparatide (recombinant)      Inject Subcutaneous every morning        glucosamine chondroitin 500 complex Caps      1 capsule daily at noon        ibuprofen 200 MG tablet    ADVIL/MOTRIN     Take 200 mg by mouth daily ON HOLD FOR SURGERY SINCE 05/01/2018        magnesium citrate solution      Take 296 mLs by mouth At Bedtime        montelukast 10 MG tablet    SINGULAIR    30 tablet    Take 1 tablet (10 mg) by mouth daily    Allergic urticaria       nabumetone 500 MG tablet    RELAFEN     Take 500 mg by mouth as needed        OMEGA-3 FISH OIL PO      Take 1,200 mg by mouth 2 times daily (with meals) ON HOLD FOR SURGERY SINCE 05/01/2018        PROBIOTIC PO      Take by mouth every other day        UNABLE TO FIND      Apply 0.25 tsp. topically 2 times daily MEDICATION NAME: Bio- estrol, plant based hormone replacement therapy        * UNCLASSIFIED OTC PRODUCT Misc      Calcium AEP 1-2 capsules by mouth daily ?Supports cell membranes* TID        * UNCLASSIFIED OTC PRODUCT Misc      RelaxaMag? 1 capsule at bedtime as needed -  a unique magnesium supplement that helps improve sleep and stress tolerance in the evening        valACYclovir 500 MG tablet    VALTREX    30 tablet    Take 1 tablet (500 mg) by mouth 2 times daily For 3 days when flares occur    Herpes simplex virus (HSV) infection       * Notice:  This list has 2 medication(s) that are the same as other medications prescribed for you. Read the directions carefully, and ask your doctor or other care provider to review them with you.

## 2018-05-07 NOTE — ANESTHESIA PREPROCEDURE EVALUATION
Anesthesia Evaluation     . Pt has had prior anesthetic. Type: General and MAC    History of anesthetic complications   - PONV        ROS/MED HX    ENT/Pulmonary:     (+)allergic rhinitis (Montelukast as needed. ), , . .   (-) tobacco use, asthma and CAROLYNN risk factors   Neurologic:  - neg neurologic ROS     Cardiovascular: Comment: H/o PVC, s/p ablation 2007 at [facility].     (+) ----. : . . . :. dysrhythmias PVCs, . Previous cardiac testing Echodate:results:date: results:ECG reviewed date: results: date: results:          METS/Exercise Tolerance: Comment: Walks on regular basis 1-2 miles per day.  Uses her walking stick.  >4 METS   Hematologic:  - neg hematologic  ROS       Musculoskeletal: Comment: Lumbar 4-5 pseudoarthrosis and spondylolisthesis, lumbar flat back deformity with greater than 30 degrees of pelvic incidence-lumbar lordosis mismatch.     Cervical spine pain >>>mild multilevel spinal canal stenosis.    Osteoporosis >>> on Forteo        GI/Hepatic:     (+) cholecystitis/cholelithiasis (s/p cholecystectomy),       Renal/Genitourinary:  - ROS Renal section negative       Endo:  - neg endo ROS       Psychiatric:     (+) psychiatric history Psychiatric Hx List: Situational depression no longer on medication.       Infectious Disease:  - neg infectious disease ROS       Malignancy:      - no malignancy   Other:    (+) No chance of pregnancy H/O Chronic Pain,                   Physical Exam  Normal systems: pulmonary and dental    Airway   Mallampati: I  TM distance: >3 FB  Neck ROM: full    Dental     Cardiovascular   Rhythm and rate: regular and normal      Pulmonary    breath sounds clear to auscultation    Other findings: Labs  WBC 5.6  Hgb 12.9  Hct 39.7  Plts 275    Na 141  K+ 4.5  BUN 24  Cr 0.85 GFR >60    Procedures  EKG SR 69 BPM with RBBB    Echocardiogram  11/2016  Interpretation Summary     Left ventricular systolic function is normal.  Grade II or moderate diastolic dysfunction.  No  regional wall motion abnormalities noted.           PAC Discussion and Assessment    ASA Classification: 3  Case is suitable for: [place]  Anesthetic techniques and relevant risks discussed: GA  Invasive monitoring and risk discussed:   Types:   Possibility and Risk of blood transfusion discussed:   NPO instructions given:   Additional anesthetic preparation and risks discussed:   Needs early admission to pre-op area:   Other:     PAC Resident/NP Anesthesia Assessment:  Marcia Kauffman is scheduled for O-Arm/Stealth Assisted Thoracic 7 To Sacral 1 Posterior Instrumented Fusion, Pelvic Fixation, Revision Of Lumbar 4-5 Instrumentation, Transforaminal Interbody Fusion With Oshea-Limon Osteotomies,  Lumbar 1-2, 2-3, 3-4, 4-5, Lumbar 5-Sacral 1, Bone Morphogenic Protein For Interbody Fusion, Possible Lumbar Pedicle Subtraction Osteotomy, Possible Cement Augmentation Of Screws, Possible Kyphoplasty, Possible Additional Levels on 5/14/2018 with Ivory Beckford and Katya at [facility] under general anesthesia.  Ms. Kauffman was seen by Dr. Beckford on 2/1/2018 for complaints of low back pain, standing intolerence and left hip and groin pain despite ongoing physical therapy.  She was found to have  lumbar 4-5 pseudoarthrosis and spondylolisthesis, lumbar flatback deformity with greater than 30 degrees of pelvic incidence-lumbar lordosis mismatch.  Dr. Beckford recommended the above procedure. Please see Dr. Beckford note for further details. PAC referral for risk assessment and optimization of anesthesia with comorbid conditions including:  as above; PVC's, s/p ablation; seasonal allergies; osteoporosis and PONV.      H&P completed by PCP>>>See Care Everywhere.     She has the following specific operative considerations:     1.  Cardiology - H/o PVC's>>>s/p ablation 2007 at [facility].  RBBB after procedure.  Denies any c/o palpitations.   METS>4.  EKG:  SR 69 bpm with RBBB  "(10/12/2015).  Echo>>>EF 60-65% and Gr II diastolic dysfunction.  RCRI : No serious cardiac risks.  0.4 % risk of major adverse cardiac event.  Denies ASA use.   2.  Pulmonary - no smoking hx       - CAROLYNN # of risks 1/8 = low risk  3.  Hematology - VTE risk:  0.5%       - T&S today  4.  GI - Known PONV >>> anti-emetic intervention recommended.  Known N/V from narcotics>>>compazine works well.   5.  Musculoskeletal - lumbar 4-5 pseudoarthrosis and spondylolisthesis, lumbar flatback deformity with greater than 30 degrees of pelvic incidence-lumbar lordosis mismatch>>>despite PT, continues to have symptoms>>>above procedure planned.        - osteoporosis >>on Forteo.  Per Dr. Beckford note, \"bring her Forteo with her to the hospital and will be administering daily injections of this\".       - Intermittent cervical spine pain>>>imaging from 7/13/17 demonstrated mild multilevel spinal canal stenosis; moderate to severe right C4-5 and C5-6 neural foraminal stenosis  6.  Neuro 7.  HEENT - Seasonal allergies>>>take montelukast   8. Herpes Simplex - take valacyclovir as prescribed    - Anesthesia considerations:  Refer to PAC assessment in anesthesia records  - Airway:  Appears feasible.  Evidence of cervical spine stenosis on imagining.    Arrival time, NPO, shower and medication instructions provided by nursing staff today.  Preparing For Your Surgery handout given.  Patient was discussed with Dr Willams. I spent 20 minutes face to face with patient assessing, educating, counseling and/or coordinating care and examining the patient.  Of that 20 minutes, I spent greater than 50% of my time counseling and/or coordinating care.  All of the above labs and procedures I personally reviewed.            Reviewed and Signed by PAC Mid-Level Provider/Resident  Mid-Level Provider/Resident: Antoinette KENNEDY CNP  Date: 5/7/2018  Time: 11:14    Attending Anesthesiologist Anesthesia Assessment:  67 year old for complex spine surgery from T7 " "to S1 in management of flat back syndrome. Known rheumatic valvular disease with mild to moderate MR, Gd II diastolic dysfunction. EF normal.    Has passed all \"hard stops\" for complex spine ERAS.       Anesthesiologist:   Date:   Time:   Pass/Fail:   Disposition:     PAC Pharmacist Assessment:        Pharmacist:   Date:   Time:      Anesthesia Plan      History & Physical Review  History and physical reviewed and following examination; no interval change.    ASA Status:  2 .    NPO Status:  > 8 hours    Plan for General and ETT with Intravenous and Propofol induction. Maintenance will be TIVA.    PONV prophylaxis:  Ondansetron (or other 5HT-3), Dexamethasone or Solumedrol, Scopolamine patch and Other (See comment) (Compazine)  Additional equipment: Videolaryngoscope, 2nd IV and Arterial Line To be discussed with staff.   - ASA 3  - GETA with standard ASA monitors, IV induction, TIVA   - PIVx2, arterial line with flow-track  - Antibiotics per surgery  - PONV prophylaxis    Jeffry Cadena MD  Anesthesiology resident     I have seen and evaluated the patient and agree with the above assessment and plan by Dr. Cadena.  Rhianna Livingston  May 14, 2018    [facility]        Postoperative Care  Postoperative pain management:  IV analgesics and Multi-modal analgesia.  Plan for postoperative opioid use.    Consents  Anesthetic plan, risks, benefits and alternatives discussed with:  Patient.  Use of blood products discussed: Yes.   Consented to blood products.  .                          .  "

## 2018-05-07 NOTE — ADDENDUM NOTE
Addendum  created 05/07/18 1236 by Antoinette Cassidy APRN CNP    Diagnosis association updated, Order sets accessed, Visit diagnoses modified

## 2018-05-11 ENCOUNTER — TELEPHONE (OUTPATIENT)
Dept: NEUROSURGERY | Facility: CLINIC | Age: 68
End: 2018-05-11

## 2018-05-11 NOTE — TELEPHONE ENCOUNTER
Pre op call.   Spoke with the pt.  No questions about surgery.  She told writer that Monday is the first day of her new life.

## 2018-05-14 ENCOUNTER — HOSPITAL ENCOUNTER (INPATIENT)
Facility: CLINIC | Age: 68
LOS: 3 days | Discharge: HOME | DRG: 454 | End: 2018-05-17
Attending: NEUROLOGICAL SURGERY | Admitting: NEUROLOGICAL SURGERY
Payer: COMMERCIAL

## 2018-05-14 ENCOUNTER — ANESTHESIA (OUTPATIENT)
Dept: SURGERY | Facility: CLINIC | Age: 68
DRG: 454 | End: 2018-05-14
Payer: COMMERCIAL

## 2018-05-14 ENCOUNTER — APPOINTMENT (OUTPATIENT)
Dept: GENERAL RADIOLOGY | Facility: CLINIC | Age: 68
DRG: 454 | End: 2018-05-14
Attending: NEUROLOGICAL SURGERY
Payer: COMMERCIAL

## 2018-05-14 DIAGNOSIS — M48.062 LUMBAR STENOSIS WITH NEUROGENIC CLAUDICATION: Primary | ICD-10-CM

## 2018-05-14 DIAGNOSIS — Z98.1 S/P LUMBAR FUSION: ICD-10-CM

## 2018-05-14 LAB
ABO + RH BLD: NORMAL
ABO + RH BLD: NORMAL
ANGLE RATE OF CLOT GROWTH: 74.9 DEG (ref 59–74)
ANGLE RATE OF CLOT GROWTH: 75.1 DEG (ref 59–74)
ANION GAP SERPL CALCULATED.3IONS-SCNC: 10 MMOL/L (ref 3–14)
APTT PPP: 27 SEC (ref 22–37)
APTT PPP: 28 SEC (ref 22–37)
APTT PPP: 28 SEC (ref 22–37)
APTT PPP: 30 SEC (ref 22–37)
BASE EXCESS BLDA CALC-SCNC: 0.6 MMOL/L
BASE EXCESS BLDA CALC-SCNC: 0.9 MMOL/L
BASE EXCESS BLDA CALC-SCNC: 1.1 MMOL/L
BASE EXCESS BLDA CALC-SCNC: 1.4 MMOL/L
BASE EXCESS BLDA CALC-SCNC: 2.2 MMOL/L
BASOPHILS # BLD AUTO: 0 10E9/L (ref 0–0.2)
BASOPHILS # BLD AUTO: 0 10E9/L (ref 0–0.2)
BASOPHILS NFR BLD AUTO: 0 %
BASOPHILS NFR BLD AUTO: 0 %
BLD GP AB SCN SERPL QL: NORMAL
BLD PROD TYP BPU: NORMAL
BLD PROD TYP BPU: NORMAL
BLD UNIT ID BPU: 0
BLOOD BANK CMNT PATIENT-IMP: NORMAL
BLOOD BANK CMNT PATIENT-IMP: NORMAL
BLOOD PRODUCT CODE: NORMAL
BPU ID: NORMAL
BUN SERPL-MCNC: 19 MG/DL (ref 7–30)
CA-I BLD-MCNC: 4.9 MG/DL (ref 4.4–5.2)
CA-I BLD-MCNC: 5 MG/DL (ref 4.4–5.2)
CA-I BLD-MCNC: 5 MG/DL (ref 4.4–5.2)
CA-I BLD-MCNC: 5.1 MG/DL (ref 4.4–5.2)
CA-I BLD-MCNC: 5.2 MG/DL (ref 4.4–5.2)
CALCIUM SERPL-MCNC: 8.6 MG/DL (ref 8.5–10.1)
CHLORIDE SERPL-SCNC: 104 MMOL/L (ref 94–109)
CI HYPERCOAGULATION INDEX: 2.5 RATIO (ref 0–3)
CI HYPERCOAGULATION INDEX: 3 RATIO (ref 0–3)
CLOT LYSIS 30M P MA LENFR BLD TEG: 0 % (ref 0–8)
CLOT LYSIS 30M P MA LENFR BLD TEG: 2 % (ref 0–8)
CLOT STRENGTH BLD TEG: 10 KD/SC (ref 5.3–13.2)
CLOT STRENGTH BLD TEG: 7.7 KD/SC (ref 5.3–13.2)
CO2 SERPL-SCNC: 26 MMOL/L (ref 20–32)
CREAT SERPL-MCNC: 0.83 MG/DL (ref 0.52–1.04)
DIFFERENTIAL METHOD BLD: ABNORMAL
DIFFERENTIAL METHOD BLD: ABNORMAL
EOSINOPHIL # BLD AUTO: 0 10E9/L (ref 0–0.7)
EOSINOPHIL # BLD AUTO: 0 10E9/L (ref 0–0.7)
EOSINOPHIL NFR BLD AUTO: 0 %
EOSINOPHIL NFR BLD AUTO: 0 %
ERYTHROCYTE [DISTWIDTH] IN BLOOD BY AUTOMATED COUNT: 13.9 % (ref 10–15)
ERYTHROCYTE [DISTWIDTH] IN BLOOD BY AUTOMATED COUNT: 13.9 % (ref 10–15)
FIBRINOGEN PPP-MCNC: 205 MG/DL (ref 200–420)
FIBRINOGEN PPP-MCNC: 263 MG/DL (ref 200–420)
GFR SERPL CREATININE-BSD FRML MDRD: 68 ML/MIN/1.7M2
GLUCOSE BLD-MCNC: 138 MG/DL (ref 70–99)
GLUCOSE BLD-MCNC: 147 MG/DL (ref 70–99)
GLUCOSE BLD-MCNC: 154 MG/DL (ref 70–99)
GLUCOSE BLD-MCNC: 155 MG/DL (ref 70–99)
GLUCOSE BLD-MCNC: 160 MG/DL (ref 70–99)
GLUCOSE BLDC GLUCOMTR-MCNC: 103 MG/DL (ref 70–99)
GLUCOSE BLDC GLUCOMTR-MCNC: 192 MG/DL (ref 70–99)
GLUCOSE SERPL-MCNC: 178 MG/DL (ref 70–99)
HCO3 BLD-SCNC: 25 MMOL/L (ref 21–28)
HCO3 BLD-SCNC: 26 MMOL/L (ref 21–28)
HCO3 BLD-SCNC: 26 MMOL/L (ref 21–28)
HCT VFR BLD AUTO: 33.9 % (ref 35–47)
HCT VFR BLD AUTO: 36.7 % (ref 35–47)
HGB BLD-MCNC: 10.8 G/DL (ref 11.7–15.7)
HGB BLD-MCNC: 10.9 G/DL (ref 11.7–15.7)
HGB BLD-MCNC: 11 G/DL (ref 11.7–15.7)
HGB BLD-MCNC: 11.3 G/DL (ref 11.7–15.7)
HGB BLD-MCNC: 11.5 G/DL (ref 11.7–15.7)
HGB BLD-MCNC: 11.9 G/DL (ref 11.7–15.7)
HGB BLD-MCNC: 12.6 G/DL (ref 11.7–15.7)
IMM GRANULOCYTES # BLD: 0 10E9/L (ref 0–0.4)
IMM GRANULOCYTES # BLD: 0 10E9/L (ref 0–0.4)
IMM GRANULOCYTES NFR BLD: 0.2 %
IMM GRANULOCYTES NFR BLD: 0.3 %
INR PPP: 1.1 (ref 0.86–1.14)
INR PPP: 1.16 (ref 0.86–1.14)
INR PPP: 1.2 (ref 0.86–1.14)
INR PPP: 1.21 (ref 0.86–1.14)
K TIME TO SPEC CLOT STRENGTH: 1 MIN (ref 1–3)
K TIME TO SPEC CLOT STRENGTH: 1.1 MIN (ref 1–3)
LACTATE BLD-SCNC: 1.1 MMOL/L (ref 0.7–2)
LACTATE BLD-SCNC: 1.4 MMOL/L (ref 0.7–2)
LACTATE BLD-SCNC: 1.5 MMOL/L (ref 0.7–2)
LACTATE BLD-SCNC: 1.5 MMOL/L (ref 0.7–2)
LACTATE BLD-SCNC: 1.7 MMOL/L (ref 0.7–2)
LY60 LYSIS AT 60 MINUTES: 3.1 % (ref 0–15)
LY60 LYSIS AT 60 MINUTES: 7.2 % (ref 0–15)
LYMPHOCYTES # BLD AUTO: 0.7 10E9/L (ref 0.8–5.3)
LYMPHOCYTES # BLD AUTO: 1.1 10E9/L (ref 0.8–5.3)
LYMPHOCYTES NFR BLD AUTO: 4.4 %
LYMPHOCYTES NFR BLD AUTO: 7.5 %
MA MAXIMUM CLOT STRENGTH: 60.6 MM (ref 55–74)
MA MAXIMUM CLOT STRENGTH: 66.7 MM (ref 55–74)
MAGNESIUM SERPL-MCNC: 1.3 MG/DL (ref 1.6–2.3)
MCH RBC QN AUTO: 32.6 PG (ref 26.5–33)
MCH RBC QN AUTO: 33.2 PG (ref 26.5–33)
MCHC RBC AUTO-ENTMCNC: 33.3 G/DL (ref 31.5–36.5)
MCHC RBC AUTO-ENTMCNC: 34.3 G/DL (ref 31.5–36.5)
MCV RBC AUTO: 97 FL (ref 78–100)
MCV RBC AUTO: 98 FL (ref 78–100)
MONOCYTES # BLD AUTO: 1.2 10E9/L (ref 0–1.3)
MONOCYTES # BLD AUTO: 1.4 10E9/L (ref 0–1.3)
MONOCYTES NFR BLD AUTO: 8.5 %
MONOCYTES NFR BLD AUTO: 8.6 %
NEUTROPHILS # BLD AUTO: 11.6 10E9/L (ref 1.6–8.3)
NEUTROPHILS # BLD AUTO: 13.9 10E9/L (ref 1.6–8.3)
NEUTROPHILS NFR BLD AUTO: 83.7 %
NEUTROPHILS NFR BLD AUTO: 86.8 %
NRBC # BLD AUTO: 0 10*3/UL
NRBC # BLD AUTO: 0 10*3/UL
NRBC BLD AUTO-RTO: 0 /100
NRBC BLD AUTO-RTO: 0 /100
NUM BPU REQUESTED: 2
O2/TOTAL GAS SETTING VFR VENT: 30 %
O2/TOTAL GAS SETTING VFR VENT: 30 %
O2/TOTAL GAS SETTING VFR VENT: 40 %
PCO2 BLD: 35 MM HG (ref 35–45)
PCO2 BLD: 36 MM HG (ref 35–45)
PCO2 BLD: 36 MM HG (ref 35–45)
PCO2 BLD: 37 MM HG (ref 35–45)
PCO2 BLD: 38 MM HG (ref 35–45)
PH BLD: 7.44 PH (ref 7.35–7.45)
PH BLD: 7.44 PH (ref 7.35–7.45)
PH BLD: 7.45 PH (ref 7.35–7.45)
PHOSPHATE SERPL-MCNC: 3.8 MG/DL (ref 2.5–4.5)
PLATELET # BLD AUTO: 127 10E9/L (ref 150–450)
PLATELET # BLD AUTO: 139 10E9/L (ref 150–450)
PLATELET # BLD AUTO: 193 10E9/L (ref 150–450)
PLATELET # BLD AUTO: 97 10E9/L (ref 150–450)
PO2 BLD: 118 MM HG (ref 80–105)
PO2 BLD: 124 MM HG (ref 80–105)
PO2 BLD: 124 MM HG (ref 80–105)
PO2 BLD: 127 MM HG (ref 80–105)
PO2 BLD: 99 MM HG (ref 80–105)
POTASSIUM BLD-SCNC: 3.7 MMOL/L (ref 3.4–5.3)
POTASSIUM BLD-SCNC: 4 MMOL/L (ref 3.4–5.3)
POTASSIUM BLD-SCNC: 4 MMOL/L (ref 3.4–5.3)
POTASSIUM BLD-SCNC: 4.1 MMOL/L (ref 3.4–5.3)
POTASSIUM BLD-SCNC: 4.2 MMOL/L (ref 3.4–5.3)
POTASSIUM SERPL-SCNC: 3.8 MMOL/L (ref 3.4–5.3)
R TIME UNTIL CLOT FORMS: 3.8 MIN (ref 4–9)
R TIME UNTIL CLOT FORMS: 4 MIN (ref 4–9)
RBC # BLD AUTO: 3.47 10E12/L (ref 3.8–5.2)
RBC # BLD AUTO: 3.8 10E12/L (ref 3.8–5.2)
SODIUM BLD-SCNC: 136 MMOL/L (ref 133–144)
SODIUM BLD-SCNC: 137 MMOL/L (ref 133–144)
SODIUM BLD-SCNC: 137 MMOL/L (ref 133–144)
SODIUM BLD-SCNC: 138 MMOL/L (ref 133–144)
SODIUM BLD-SCNC: 138 MMOL/L (ref 133–144)
SODIUM SERPL-SCNC: 140 MMOL/L (ref 133–144)
SPECIMEN EXP DATE BLD: NORMAL
TRANSFUSION STATUS PATIENT QL: NORMAL
TRANSFUSION STATUS PATIENT QL: NORMAL
WBC # BLD AUTO: 13.9 10E9/L (ref 4–11)
WBC # BLD AUTO: 16 10E9/L (ref 4–11)

## 2018-05-14 PROCEDURE — 83735 ASSAY OF MAGNESIUM: CPT | Performed by: NURSE PRACTITIONER

## 2018-05-14 PROCEDURE — 71000014 ZZH RECOVERY PHASE 1 LEVEL 2 FIRST HR: Performed by: NEUROLOGICAL SURGERY

## 2018-05-14 PROCEDURE — 40000170 ZZH STATISTIC PRE-PROCEDURE ASSESSMENT II: Performed by: NEUROLOGICAL SURGERY

## 2018-05-14 PROCEDURE — 82330 ASSAY OF CALCIUM: CPT | Performed by: ANESTHESIOLOGY

## 2018-05-14 PROCEDURE — 27211020 ZZHC OR CELL SAVER OPNP: Performed by: NEUROLOGICAL SURGERY

## 2018-05-14 PROCEDURE — 25000128 H RX IP 250 OP 636: Performed by: ANESTHESIOLOGY

## 2018-05-14 PROCEDURE — 84132 ASSAY OF SERUM POTASSIUM: CPT | Performed by: ANESTHESIOLOGY

## 2018-05-14 PROCEDURE — 27211022 ZZHC OR IOM SUPPLIES OPNP: Performed by: NEUROLOGICAL SURGERY

## 2018-05-14 PROCEDURE — 25000128 H RX IP 250 OP 636: Performed by: NURSE PRACTITIONER

## 2018-05-14 PROCEDURE — 84295 ASSAY OF SERUM SODIUM: CPT | Performed by: ANESTHESIOLOGY

## 2018-05-14 PROCEDURE — 27810169 ZZH OR IMPLANT GENERAL: Performed by: NEUROLOGICAL SURGERY

## 2018-05-14 PROCEDURE — 40000065 ZZH STATISTIC EKG NON-CHARGEABLE

## 2018-05-14 PROCEDURE — 25000128 H RX IP 250 OP 636: Performed by: STUDENT IN AN ORGANIZED HEALTH CARE EDUCATION/TRAINING PROGRAM

## 2018-05-14 PROCEDURE — 37000009 ZZH ANESTHESIA TECHNICAL FEE, EACH ADDTL 15 MIN: Performed by: NEUROLOGICAL SURGERY

## 2018-05-14 PROCEDURE — 71000015 ZZH RECOVERY PHASE 1 LEVEL 2 EA ADDTL HR: Performed by: NEUROLOGICAL SURGERY

## 2018-05-14 PROCEDURE — C1762 CONN TISS, HUMAN(INC FASCIA): HCPCS | Performed by: NEUROLOGICAL SURGERY

## 2018-05-14 PROCEDURE — 25000565 ZZH ISOFLURANE, EA 15 MIN: Performed by: NEUROLOGICAL SURGERY

## 2018-05-14 PROCEDURE — 36000078 ZZH SURGERY LEVEL 6 W FLUORO 1ST 30 MIN - UMMC: Performed by: NEUROLOGICAL SURGERY

## 2018-05-14 PROCEDURE — P9016 RBC LEUKOCYTES REDUCED: HCPCS | Performed by: NURSE PRACTITIONER

## 2018-05-14 PROCEDURE — 36415 COLL VENOUS BLD VENIPUNCTURE: CPT | Performed by: NURSE PRACTITIONER

## 2018-05-14 PROCEDURE — 82803 BLOOD GASES ANY COMBINATION: CPT | Performed by: ANESTHESIOLOGY

## 2018-05-14 PROCEDURE — 85049 AUTOMATED PLATELET COUNT: CPT | Performed by: NEUROLOGICAL SURGERY

## 2018-05-14 PROCEDURE — 40000275 ZZH STATISTIC RCP TIME EA 10 MIN

## 2018-05-14 PROCEDURE — 0SG10AJ FUSION OF 2 OR MORE LUMBAR VERTEBRAL JOINTS WITH INTERBODY FUSION DEVICE, POSTERIOR APPROACH, ANTERIOR COLUMN, OPEN APPROACH: ICD-10-PCS | Performed by: NEUROLOGICAL SURGERY

## 2018-05-14 PROCEDURE — 0SG1071 FUSION OF 2 OR MORE LUMBAR VERTEBRAL JOINTS WITH AUTOLOGOUS TISSUE SUBSTITUTE, POSTERIOR APPROACH, POSTERIOR COLUMN, OPEN APPROACH: ICD-10-PCS | Performed by: NEUROLOGICAL SURGERY

## 2018-05-14 PROCEDURE — 00000146 ZZHCL STATISTIC GLUCOSE BY METER IP

## 2018-05-14 PROCEDURE — 40000277 XR SURGERY CARM FLUORO LESS THAN 5 MIN W STILLS: Mod: TC

## 2018-05-14 PROCEDURE — 93010 ELECTROCARDIOGRAM REPORT: CPT | Performed by: INTERNAL MEDICINE

## 2018-05-14 PROCEDURE — C1713 ANCHOR/SCREW BN/BN,TIS/BN: HCPCS | Performed by: NEUROLOGICAL SURGERY

## 2018-05-14 PROCEDURE — 85610 PROTHROMBIN TIME: CPT | Performed by: NEUROLOGICAL SURGERY

## 2018-05-14 PROCEDURE — 25000125 ZZHC RX 250: Performed by: NEUROLOGICAL SURGERY

## 2018-05-14 PROCEDURE — 0ST20ZZ RESECTION OF LUMBAR VERTEBRAL DISC, OPEN APPROACH: ICD-10-PCS | Performed by: NEUROLOGICAL SURGERY

## 2018-05-14 PROCEDURE — 25000125 ZZHC RX 250: Performed by: ANESTHESIOLOGY

## 2018-05-14 PROCEDURE — 85384 FIBRINOGEN ACTIVITY: CPT | Performed by: NEUROLOGICAL SURGERY

## 2018-05-14 PROCEDURE — 12000003 ZZH R&B CRITICAL UMMC

## 2018-05-14 PROCEDURE — 27210995 ZZH RX 272

## 2018-05-14 PROCEDURE — 25000128 H RX IP 250 OP 636: Performed by: NEUROLOGICAL SURGERY

## 2018-05-14 PROCEDURE — 82947 ASSAY GLUCOSE BLOOD QUANT: CPT | Performed by: ANESTHESIOLOGY

## 2018-05-14 PROCEDURE — 85610 PROTHROMBIN TIME: CPT | Performed by: NURSE PRACTITIONER

## 2018-05-14 PROCEDURE — 95940 IONM IN OPERATNG ROOM 15 MIN: CPT | Performed by: NEUROLOGICAL SURGERY

## 2018-05-14 PROCEDURE — 85730 THROMBOPLASTIN TIME PARTIAL: CPT | Performed by: NURSE PRACTITIONER

## 2018-05-14 PROCEDURE — 27211024 ZZHC OR SUPPLY OTHER OPNP: Performed by: NEUROLOGICAL SURGERY

## 2018-05-14 PROCEDURE — 85396 CLOTTING ASSAY WHOLE BLOOD: CPT | Performed by: NEUROLOGICAL SURGERY

## 2018-05-14 PROCEDURE — 0SG3071 FUSION OF LUMBOSACRAL JOINT WITH AUTOLOGOUS TISSUE SUBSTITUTE, POSTERIOR APPROACH, POSTERIOR COLUMN, OPEN APPROACH: ICD-10-PCS | Performed by: NEUROLOGICAL SURGERY

## 2018-05-14 PROCEDURE — 37000008 ZZH ANESTHESIA TECHNICAL FEE, 1ST 30 MIN: Performed by: NEUROLOGICAL SURGERY

## 2018-05-14 PROCEDURE — 25000132 ZZH RX MED GY IP 250 OP 250 PS 637: Performed by: NURSE PRACTITIONER

## 2018-05-14 PROCEDURE — 0QP004Z REMOVAL OF INTERNAL FIXATION DEVICE FROM LUMBAR VERTEBRA, OPEN APPROACH: ICD-10-PCS | Performed by: NEUROLOGICAL SURGERY

## 2018-05-14 PROCEDURE — 27210995 ZZH RX 272: Performed by: NEUROLOGICAL SURGERY

## 2018-05-14 PROCEDURE — 27210794 ZZH OR GENERAL SUPPLY STERILE: Performed by: NEUROLOGICAL SURGERY

## 2018-05-14 PROCEDURE — 80048 BASIC METABOLIC PNL TOTAL CA: CPT | Performed by: NURSE PRACTITIONER

## 2018-05-14 PROCEDURE — 83605 ASSAY OF LACTIC ACID: CPT | Performed by: ANESTHESIOLOGY

## 2018-05-14 PROCEDURE — 36000076 ZZH SURGERY LEVEL 6 EA 15 ADDTL MIN - UMMC: Performed by: NEUROLOGICAL SURGERY

## 2018-05-14 PROCEDURE — 85730 THROMBOPLASTIN TIME PARTIAL: CPT | Performed by: NEUROLOGICAL SURGERY

## 2018-05-14 PROCEDURE — 85025 COMPLETE CBC W/AUTO DIFF WBC: CPT | Performed by: NURSE PRACTITIONER

## 2018-05-14 PROCEDURE — P9041 ALBUMIN (HUMAN),5%, 50ML: HCPCS | Performed by: ANESTHESIOLOGY

## 2018-05-14 PROCEDURE — 84100 ASSAY OF PHOSPHORUS: CPT | Performed by: NURSE PRACTITIONER

## 2018-05-14 PROCEDURE — L8699 PROSTHETIC IMPLANT NOS: HCPCS | Performed by: NEUROLOGICAL SURGERY

## 2018-05-14 PROCEDURE — 40000014 ZZH STATISTIC ARTERIAL MONITORING DAILY

## 2018-05-14 DEVICE — IMPLANTABLE DEVICE: Type: IMPLANTABLE DEVICE | Site: SPINE LUMBAR | Status: FUNCTIONAL

## 2018-05-14 DEVICE — IMP SPACER MEDT CAPSTONE CONTROL 13X22MM 18DEG 4021322: Type: IMPLANTABLE DEVICE | Site: SPINE LUMBAR | Status: FUNCTIONAL

## 2018-05-14 DEVICE — GRAFT BONE CRUSH CANC 30ML 400080: Type: IMPLANTABLE DEVICE | Site: SPINE LUMBAR | Status: FUNCTIONAL

## 2018-05-14 DEVICE — IMP SCR MEDT 5.5/6.0MM SOLERA 7.5X50MM MA 55840007550: Type: IMPLANTABLE DEVICE | Site: SPINE LUMBAR | Status: FUNCTIONAL

## 2018-05-14 DEVICE — IMP SCR MEDT 5.5/6.0MM SOLERA 6.5X50MM MA 55840006550: Type: IMPLANTABLE DEVICE | Site: SPINE LUMBAR | Status: FUNCTIONAL

## 2018-05-14 DEVICE — IMP SCR MEDT 5.5/6.0MM SOLERA 5.5X50MM MA 55840005550: Type: IMPLANTABLE DEVICE | Site: SPINE LUMBAR | Status: FUNCTIONAL

## 2018-05-14 DEVICE — IMP ROD MEDT SOLERA LINED 5.5X500MM CHR 1555200500: Type: IMPLANTABLE DEVICE | Site: SPINE LUMBAR | Status: FUNCTIONAL

## 2018-05-14 DEVICE — IMP SCR SET MEDT SOLERA BREAK OFF 5.5MM TI 5540030: Type: IMPLANTABLE DEVICE | Site: SPINE LUMBAR | Status: FUNCTIONAL

## 2018-05-14 DEVICE — GRAFT BONE INFUSE BMP LG 7510600: Type: IMPLANTABLE DEVICE | Site: SPINE LUMBAR | Status: FUNCTIONAL

## 2018-05-14 DEVICE — IMP SCR MEDT 5.5/6.0MM SOLERA 7.5X45MM MA 55840007545: Type: IMPLANTABLE DEVICE | Site: SPINE LUMBAR | Status: FUNCTIONAL

## 2018-05-14 RX ORDER — SCOLOPAMINE TRANSDERMAL SYSTEM 1 MG/1
1 PATCH, EXTENDED RELEASE TRANSDERMAL ONCE
Status: COMPLETED | OUTPATIENT
Start: 2018-05-14 | End: 2018-05-14

## 2018-05-14 RX ORDER — HYDROMORPHONE HYDROCHLORIDE 1 MG/ML
.3-.5 INJECTION, SOLUTION INTRAMUSCULAR; INTRAVENOUS; SUBCUTANEOUS
Status: DISPENSED | OUTPATIENT
Start: 2018-05-14 | End: 2018-05-16

## 2018-05-14 RX ORDER — SODIUM CHLORIDE, SODIUM LACTATE, POTASSIUM CHLORIDE, CALCIUM CHLORIDE 600; 310; 30; 20 MG/100ML; MG/100ML; MG/100ML; MG/100ML
INJECTION, SOLUTION INTRAVENOUS CONTINUOUS
Status: DISCONTINUED | OUTPATIENT
Start: 2018-05-14 | End: 2018-05-14 | Stop reason: HOSPADM

## 2018-05-14 RX ORDER — ACETAMINOPHEN 325 MG/1
650 TABLET ORAL EVERY 4 HOURS PRN
Status: DISCONTINUED | OUTPATIENT
Start: 2018-05-17 | End: 2018-05-17 | Stop reason: HOSPADM

## 2018-05-14 RX ORDER — OXYCODONE HYDROCHLORIDE 5 MG/1
5-10 TABLET ORAL EVERY 4 HOURS PRN
Status: DISCONTINUED | OUTPATIENT
Start: 2018-05-14 | End: 2018-05-17 | Stop reason: HOSPADM

## 2018-05-14 RX ORDER — SODIUM CHLORIDE, SODIUM LACTATE, POTASSIUM CHLORIDE, CALCIUM CHLORIDE 600; 310; 30; 20 MG/100ML; MG/100ML; MG/100ML; MG/100ML
INJECTION, SOLUTION INTRAVENOUS CONTINUOUS PRN
Status: DISCONTINUED | OUTPATIENT
Start: 2018-05-14 | End: 2018-05-14

## 2018-05-14 RX ORDER — ONDANSETRON 2 MG/ML
4 INJECTION INTRAMUSCULAR; INTRAVENOUS EVERY 6 HOURS PRN
Status: DISCONTINUED | OUTPATIENT
Start: 2018-05-14 | End: 2018-05-17 | Stop reason: HOSPADM

## 2018-05-14 RX ORDER — PROPOFOL 10 MG/ML
INJECTION, EMULSION INTRAVENOUS PRN
Status: DISCONTINUED | OUTPATIENT
Start: 2018-05-14 | End: 2018-05-14

## 2018-05-14 RX ORDER — PROPOFOL 10 MG/ML
INJECTION, EMULSION INTRAVENOUS CONTINUOUS PRN
Status: DISCONTINUED | OUTPATIENT
Start: 2018-05-14 | End: 2018-05-14

## 2018-05-14 RX ORDER — ONDANSETRON 2 MG/ML
INJECTION INTRAMUSCULAR; INTRAVENOUS PRN
Status: DISCONTINUED | OUTPATIENT
Start: 2018-05-14 | End: 2018-05-14

## 2018-05-14 RX ORDER — LABETALOL HYDROCHLORIDE 5 MG/ML
10-40 INJECTION, SOLUTION INTRAVENOUS EVERY 10 MIN PRN
Status: DISCONTINUED | OUTPATIENT
Start: 2018-05-14 | End: 2018-05-17 | Stop reason: HOSPADM

## 2018-05-14 RX ORDER — AMOXICILLIN 250 MG
2 CAPSULE ORAL 2 TIMES DAILY
Status: DISCONTINUED | OUTPATIENT
Start: 2018-05-14 | End: 2018-05-17 | Stop reason: HOSPADM

## 2018-05-14 RX ORDER — LIDOCAINE 4 G/G
1-3 PATCH TOPICAL
Status: DISCONTINUED | OUTPATIENT
Start: 2018-05-14 | End: 2018-05-17 | Stop reason: HOSPADM

## 2018-05-14 RX ORDER — NALOXONE HYDROCHLORIDE 0.4 MG/ML
.1-.4 INJECTION, SOLUTION INTRAMUSCULAR; INTRAVENOUS; SUBCUTANEOUS
Status: DISCONTINUED | OUTPATIENT
Start: 2018-05-14 | End: 2018-05-17 | Stop reason: HOSPADM

## 2018-05-14 RX ORDER — ALBUMIN HUMAN 5 %
INTRAVENOUS SOLUTION INTRAVENOUS PRN
Status: DISCONTINUED | OUTPATIENT
Start: 2018-05-14 | End: 2018-05-14

## 2018-05-14 RX ORDER — ONDANSETRON 4 MG/1
4 TABLET, ORALLY DISINTEGRATING ORAL EVERY 6 HOURS PRN
Status: DISCONTINUED | OUTPATIENT
Start: 2018-05-14 | End: 2018-05-17 | Stop reason: HOSPADM

## 2018-05-14 RX ORDER — ACETAMINOPHEN 325 MG/1
975 TABLET ORAL EVERY 8 HOURS
Status: COMPLETED | OUTPATIENT
Start: 2018-05-14 | End: 2018-05-17

## 2018-05-14 RX ORDER — CALCIUM CHLORIDE 100 MG/ML
INJECTION INTRAVENOUS; INTRAVENTRICULAR PRN
Status: DISCONTINUED | OUTPATIENT
Start: 2018-05-14 | End: 2018-05-14

## 2018-05-14 RX ORDER — METHOCARBAMOL 500 MG/1
500 TABLET, FILM COATED ORAL 4 TIMES DAILY PRN
Status: DISCONTINUED | OUTPATIENT
Start: 2018-05-14 | End: 2018-05-17 | Stop reason: HOSPADM

## 2018-05-14 RX ORDER — FENTANYL CITRATE 50 UG/ML
25-50 INJECTION, SOLUTION INTRAMUSCULAR; INTRAVENOUS
Status: DISCONTINUED | OUTPATIENT
Start: 2018-05-14 | End: 2018-05-14 | Stop reason: HOSPADM

## 2018-05-14 RX ORDER — PROCHLORPERAZINE MALEATE 5 MG
5 TABLET ORAL EVERY 6 HOURS PRN
Status: DISCONTINUED | OUTPATIENT
Start: 2018-05-14 | End: 2018-05-17 | Stop reason: HOSPADM

## 2018-05-14 RX ORDER — METOCLOPRAMIDE HYDROCHLORIDE 5 MG/ML
5 INJECTION INTRAMUSCULAR; INTRAVENOUS EVERY 6 HOURS PRN
Status: DISCONTINUED | OUTPATIENT
Start: 2018-05-14 | End: 2018-05-17 | Stop reason: HOSPADM

## 2018-05-14 RX ORDER — LIDOCAINE 40 MG/G
CREAM TOPICAL
Status: DISCONTINUED | OUTPATIENT
Start: 2018-05-14 | End: 2018-05-17 | Stop reason: HOSPADM

## 2018-05-14 RX ORDER — MONTELUKAST SODIUM 10 MG/1
10 TABLET ORAL DAILY PRN
Status: DISCONTINUED | OUTPATIENT
Start: 2018-05-14 | End: 2018-05-17 | Stop reason: HOSPADM

## 2018-05-14 RX ORDER — VALACYCLOVIR HYDROCHLORIDE 500 MG/1
500 TABLET, FILM COATED ORAL EVERY MORNING
Status: DISCONTINUED | OUTPATIENT
Start: 2018-05-15 | End: 2018-05-17 | Stop reason: HOSPADM

## 2018-05-14 RX ORDER — NALOXONE HYDROCHLORIDE 0.4 MG/ML
.1-.4 INJECTION, SOLUTION INTRAMUSCULAR; INTRAVENOUS; SUBCUTANEOUS
Status: ACTIVE | OUTPATIENT
Start: 2018-05-14 | End: 2018-05-15

## 2018-05-14 RX ORDER — MULTIPLE VITAMINS W/ MINERALS TAB 9MG-400MCG
1 TAB ORAL DAILY
Status: DISCONTINUED | OUTPATIENT
Start: 2018-05-14 | End: 2018-05-17 | Stop reason: HOSPADM

## 2018-05-14 RX ORDER — HYDROMORPHONE HYDROCHLORIDE 1 MG/ML
.3-.5 INJECTION, SOLUTION INTRAMUSCULAR; INTRAVENOUS; SUBCUTANEOUS EVERY 5 MIN PRN
Status: DISCONTINUED | OUTPATIENT
Start: 2018-05-14 | End: 2018-05-14 | Stop reason: HOSPADM

## 2018-05-14 RX ORDER — METOCLOPRAMIDE 5 MG/1
5 TABLET ORAL EVERY 6 HOURS PRN
Status: DISCONTINUED | OUTPATIENT
Start: 2018-05-14 | End: 2018-05-17 | Stop reason: HOSPADM

## 2018-05-14 RX ORDER — LIDOCAINE 40 MG/G
CREAM TOPICAL
Status: DISCONTINUED | OUTPATIENT
Start: 2018-05-14 | End: 2018-05-14 | Stop reason: HOSPADM

## 2018-05-14 RX ORDER — MAGNESIUM CARB/ALUMINUM HYDROX 105-160MG
296 TABLET,CHEWABLE ORAL AT BEDTIME
Status: DISCONTINUED | OUTPATIENT
Start: 2018-05-14 | End: 2018-05-17 | Stop reason: HOSPADM

## 2018-05-14 RX ORDER — POLYETHYLENE GLYCOL 3350 17 G/17G
17 POWDER, FOR SOLUTION ORAL 3 TIMES DAILY
Status: DISCONTINUED | OUTPATIENT
Start: 2018-05-15 | End: 2018-05-17 | Stop reason: HOSPADM

## 2018-05-14 RX ORDER — HYDRALAZINE HYDROCHLORIDE 20 MG/ML
10-20 INJECTION INTRAMUSCULAR; INTRAVENOUS EVERY 30 MIN PRN
Status: DISCONTINUED | OUTPATIENT
Start: 2018-05-14 | End: 2018-05-17 | Stop reason: HOSPADM

## 2018-05-14 RX ORDER — ASCORBIC ACID 500 MG
1000 TABLET ORAL DAILY
Status: DISCONTINUED | OUTPATIENT
Start: 2018-05-14 | End: 2018-05-17 | Stop reason: HOSPADM

## 2018-05-14 RX ORDER — CEFAZOLIN SODIUM 2 G/100ML
2 INJECTION, SOLUTION INTRAVENOUS
Status: COMPLETED | OUTPATIENT
Start: 2018-05-14 | End: 2018-05-14

## 2018-05-14 RX ORDER — FENTANYL CITRATE 50 UG/ML
INJECTION, SOLUTION INTRAMUSCULAR; INTRAVENOUS PRN
Status: DISCONTINUED | OUTPATIENT
Start: 2018-05-14 | End: 2018-05-14

## 2018-05-14 RX ORDER — NALOXONE HYDROCHLORIDE 0.4 MG/ML
.1-.4 INJECTION, SOLUTION INTRAMUSCULAR; INTRAVENOUS; SUBCUTANEOUS
Status: DISCONTINUED | OUTPATIENT
Start: 2018-05-14 | End: 2018-05-14 | Stop reason: HOSPADM

## 2018-05-14 RX ORDER — BUPIVACAINE HYDROCHLORIDE AND EPINEPHRINE 5; 5 MG/ML; UG/ML
INJECTION, SOLUTION PERINEURAL PRN
Status: DISCONTINUED | OUTPATIENT
Start: 2018-05-14 | End: 2018-05-14 | Stop reason: HOSPADM

## 2018-05-14 RX ORDER — SODIUM CHLORIDE 9 MG/ML
INJECTION, SOLUTION INTRAVENOUS CONTINUOUS
Status: DISCONTINUED | OUTPATIENT
Start: 2018-05-14 | End: 2018-05-15

## 2018-05-14 RX ORDER — HYDROXYZINE HYDROCHLORIDE 10 MG/1
10 TABLET, FILM COATED ORAL EVERY 6 HOURS PRN
Status: DISCONTINUED | OUTPATIENT
Start: 2018-05-14 | End: 2018-05-17 | Stop reason: HOSPADM

## 2018-05-14 RX ORDER — CEFAZOLIN SODIUM 1 G/3ML
1 INJECTION, POWDER, FOR SOLUTION INTRAMUSCULAR; INTRAVENOUS SEE ADMIN INSTRUCTIONS
Status: DISCONTINUED | OUTPATIENT
Start: 2018-05-14 | End: 2018-05-14 | Stop reason: HOSPADM

## 2018-05-14 RX ORDER — LIDOCAINE HYDROCHLORIDE 20 MG/ML
INJECTION, SOLUTION INFILTRATION; PERINEURAL PRN
Status: DISCONTINUED | OUTPATIENT
Start: 2018-05-14 | End: 2018-05-14

## 2018-05-14 RX ADMIN — SODIUM CHLORIDE, POTASSIUM CHLORIDE, SODIUM LACTATE AND CALCIUM CHLORIDE: 600; 310; 30; 20 INJECTION, SOLUTION INTRAVENOUS at 07:38

## 2018-05-14 RX ADMIN — Medication 100 MG: at 07:43

## 2018-05-14 RX ADMIN — CEFAZOLIN 1 G: 1 INJECTION, POWDER, FOR SOLUTION INTRAMUSCULAR; INTRAVENOUS at 12:14

## 2018-05-14 RX ADMIN — PROPOFOL 120 MG: 10 INJECTION, EMULSION INTRAVENOUS at 07:43

## 2018-05-14 RX ADMIN — CALCIUM CHLORIDE 250 MG: 100 INJECTION, SOLUTION INTRAVENOUS at 11:42

## 2018-05-14 RX ADMIN — PHENYLEPHRINE HYDROCHLORIDE 100 MCG: 10 INJECTION, SOLUTION INTRAMUSCULAR; INTRAVENOUS; SUBCUTANEOUS at 09:50

## 2018-05-14 RX ADMIN — ONDANSETRON 4 MG: 2 INJECTION INTRAMUSCULAR; INTRAVENOUS at 13:54

## 2018-05-14 RX ADMIN — OXYCODONE HYDROCHLORIDE 5 MG: 5 TABLET ORAL at 23:10

## 2018-05-14 RX ADMIN — CEFAZOLIN SODIUM 2 G: 2 INJECTION, SOLUTION INTRAVENOUS at 08:16

## 2018-05-14 RX ADMIN — Medication 0.2 MG: at 16:33

## 2018-05-14 RX ADMIN — PHENYLEPHRINE HYDROCHLORIDE 100 MCG: 10 INJECTION, SOLUTION INTRAMUSCULAR; INTRAVENOUS; SUBCUTANEOUS at 08:50

## 2018-05-14 RX ADMIN — PHENYLEPHRINE HYDROCHLORIDE 100 MCG: 10 INJECTION, SOLUTION INTRAMUSCULAR; INTRAVENOUS; SUBCUTANEOUS at 10:18

## 2018-05-14 RX ADMIN — PHENYLEPHRINE HYDROCHLORIDE 100 MCG: 10 INJECTION, SOLUTION INTRAMUSCULAR; INTRAVENOUS; SUBCUTANEOUS at 09:11

## 2018-05-14 RX ADMIN — Medication 0.3 MG: at 16:02

## 2018-05-14 RX ADMIN — LIDOCAINE 3 PATCH: 560 PATCH PERCUTANEOUS; TOPICAL; TRANSDERMAL at 20:33

## 2018-05-14 RX ADMIN — METHOCARBAMOL 500 MG: 500 TABLET ORAL at 19:06

## 2018-05-14 RX ADMIN — PHENYLEPHRINE HYDROCHLORIDE 100 MCG: 10 INJECTION, SOLUTION INTRAMUSCULAR; INTRAVENOUS; SUBCUTANEOUS at 12:35

## 2018-05-14 RX ADMIN — PHENYLEPHRINE HYDROCHLORIDE 100 MCG: 10 INJECTION, SOLUTION INTRAMUSCULAR; INTRAVENOUS; SUBCUTANEOUS at 09:27

## 2018-05-14 RX ADMIN — OXYCODONE HYDROCHLORIDE 5 MG: 5 TABLET ORAL at 20:34

## 2018-05-14 RX ADMIN — FENTANYL CITRATE 50 MCG: 50 INJECTION, SOLUTION INTRAMUSCULAR; INTRAVENOUS at 14:44

## 2018-05-14 RX ADMIN — PHENYLEPHRINE HYDROCHLORIDE 100 MCG: 10 INJECTION, SOLUTION INTRAMUSCULAR; INTRAVENOUS; SUBCUTANEOUS at 11:03

## 2018-05-14 RX ADMIN — ROCURONIUM BROMIDE 30 MG: 10 INJECTION INTRAVENOUS at 08:24

## 2018-05-14 RX ADMIN — Medication 0.2 MG: at 15:32

## 2018-05-14 RX ADMIN — PROPOFOL 100 MCG/KG/MIN: 10 INJECTION, EMULSION INTRAVENOUS at 08:16

## 2018-05-14 RX ADMIN — SUFENTANIL CITRATE 0.05 MCG/KG/HR: 50 INJECTION EPIDURAL; INTRAVENOUS at 08:16

## 2018-05-14 RX ADMIN — SODIUM CHLORIDE 1 MG/KG/HR: 9 INJECTION, SOLUTION INTRAVENOUS at 08:35

## 2018-05-14 RX ADMIN — FENTANYL CITRATE 50 MCG: 50 INJECTION, SOLUTION INTRAMUSCULAR; INTRAVENOUS at 08:31

## 2018-05-14 RX ADMIN — METHOCARBAMOL 500 MG: 500 TABLET ORAL at 23:10

## 2018-05-14 RX ADMIN — PHENYLEPHRINE HYDROCHLORIDE 50 MCG: 10 INJECTION, SOLUTION INTRAMUSCULAR; INTRAVENOUS; SUBCUTANEOUS at 10:02

## 2018-05-14 RX ADMIN — PHENYLEPHRINE HYDROCHLORIDE 50 MCG: 10 INJECTION, SOLUTION INTRAMUSCULAR; INTRAVENOUS; SUBCUTANEOUS at 09:58

## 2018-05-14 RX ADMIN — HYDROMORPHONE HYDROCHLORIDE 0.3 MG: 1 INJECTION, SOLUTION INTRAMUSCULAR; INTRAVENOUS; SUBCUTANEOUS at 17:57

## 2018-05-14 RX ADMIN — FENTANYL CITRATE 150 MCG: 50 INJECTION, SOLUTION INTRAMUSCULAR; INTRAVENOUS at 07:43

## 2018-05-14 RX ADMIN — PHENYLEPHRINE HYDROCHLORIDE 100 MCG: 10 INJECTION, SOLUTION INTRAMUSCULAR; INTRAVENOUS; SUBCUTANEOUS at 10:19

## 2018-05-14 RX ADMIN — CALCIUM CHLORIDE 250 MG: 100 INJECTION, SOLUTION INTRAVENOUS at 12:34

## 2018-05-14 RX ADMIN — SCOPALAMINE 1 PATCH: 1 PATCH, EXTENDED RELEASE TRANSDERMAL at 07:27

## 2018-05-14 RX ADMIN — CALCIUM CHLORIDE 250 MG: 100 INJECTION, SOLUTION INTRAVENOUS at 11:14

## 2018-05-14 RX ADMIN — LIDOCAINE HYDROCHLORIDE 30 MG: 20 INJECTION, SOLUTION INFILTRATION; PERINEURAL at 07:43

## 2018-05-14 RX ADMIN — ACETAMINOPHEN 975 MG: 325 TABLET, FILM COATED ORAL at 19:06

## 2018-05-14 RX ADMIN — SODIUM CHLORIDE: 9 INJECTION, SOLUTION INTRAVENOUS at 17:36

## 2018-05-14 RX ADMIN — FENTANYL CITRATE 25 MCG: 50 INJECTION, SOLUTION INTRAMUSCULAR; INTRAVENOUS at 15:19

## 2018-05-14 RX ADMIN — PHENYLEPHRINE HYDROCHLORIDE 100 MCG: 10 INJECTION, SOLUTION INTRAMUSCULAR; INTRAVENOUS; SUBCUTANEOUS at 11:41

## 2018-05-14 RX ADMIN — ALBUMIN HUMAN 250 ML: 50 SOLUTION INTRAVENOUS at 11:11

## 2018-05-14 RX ADMIN — PROCHLORPERAZINE EDISYLATE 5 MG: 5 INJECTION INTRAMUSCULAR; INTRAVENOUS at 17:56

## 2018-05-14 RX ADMIN — ALBUMIN HUMAN 250 ML: 50 SOLUTION INTRAVENOUS at 12:43

## 2018-05-14 RX ADMIN — PHENYLEPHRINE HYDROCHLORIDE 0.3 MCG/KG/MIN: 10 INJECTION, SOLUTION INTRAMUSCULAR; INTRAVENOUS; SUBCUTANEOUS at 09:27

## 2018-05-14 RX ADMIN — SODIUM CHLORIDE 1 G: 9 INJECTION, SOLUTION INTRAVENOUS at 08:16

## 2018-05-14 RX ADMIN — PHENYLEPHRINE HYDROCHLORIDE 100 MCG: 10 INJECTION, SOLUTION INTRAMUSCULAR; INTRAVENOUS; SUBCUTANEOUS at 08:49

## 2018-05-14 RX ADMIN — CEFAZOLIN 1 G: 1 INJECTION, POWDER, FOR SOLUTION INTRAMUSCULAR; INTRAVENOUS at 10:13

## 2018-05-14 ASSESSMENT — ACTIVITIES OF DAILY LIVING (ADL)
BATHING: 0-->INDEPENDENT
TOILETING: 0-->INDEPENDENT
FALL_HISTORY_WITHIN_LAST_SIX_MONTHS: NO
RETIRED_COMMUNICATION: 0-->UNDERSTANDS/COMMUNICATES WITHOUT DIFFICULTY
DRESS: 0-->INDEPENDENT
COGNITION: 0 - NO COGNITION ISSUES REPORTED
RETIRED_EATING: 0-->INDEPENDENT
TRANSFERRING: 0-->INDEPENDENT
AMBULATION: 0-->INDEPENDENT
SWALLOWING: 0-->SWALLOWS FOODS/LIQUIDS WITHOUT DIFFICULTY

## 2018-05-14 ASSESSMENT — PAIN DESCRIPTION - DESCRIPTORS
DESCRIPTORS: SORE
DESCRIPTORS: THROBBING
DESCRIPTORS: SORE
DESCRIPTORS: THROBBING
DESCRIPTORS: THROBBING
DESCRIPTORS: SORE

## 2018-05-14 ASSESSMENT — VISUAL ACUITY
OU: NORMAL ACUITY

## 2018-05-14 NOTE — ANESTHESIA PROCEDURE NOTES
Arterial Line Procedure Note  Staff:     Anesthesiologist:  STONE JAVIER    Resident/CRNA:  PHU DEAL    Arterial line performed by resident/CRNA in presence of a teaching physician    Location: In OR After Induction  Procedure Start/Stop Times:     patient identified, IV checked, site marked, risks and benefits discussed, informed consent, monitors and equipment checked, pre-op evaluation and at physician/surgeon's request      Correct Patient: Yes      Correct Position: Yes      Correct Site: Yes      Correct Procedure: Yes      Correct Laterality:  Yes    Site Marked:  Yes  Line Placement:     Procedure:  Arterial Line    Insertion Site:  Radial    Insertion laterality:  Right    Skin Prep: Chloraprep      Patient Prep: patient draped, mask, sterile gloves, sterile gown, hat and hand hygiene      Local skin infiltration:  None    Ultrasound Guided?: No      Catheter size:  3 Fr, 5 cm    Cath secured with: suture      Dressing:  Tegaderm    Complications:  None obvious    Arterial waveform: Yes      IBP within 10% of NIBP: Yes

## 2018-05-14 NOTE — OR NURSING
Multiple interruptions by multiple staff members. Per Dr. Beckford (neurosurgery) pre op labs and 2 view chest xray do not need to be done/re-done before surgery.

## 2018-05-14 NOTE — IP AVS SNAPSHOT
[address] [address]    Phone:  [phone]                                       After Visit Summary   5/14/2018    Marcia Kauffman    MRN: [id number]           After Visit Summary Signature Page     I have received my discharge instructions, and my questions have been answered. I have discussed any challenges I see with this plan with the nurse or doctor.    ..........................................................................................................................................  Patient/Patient Representative Signature      ..........................................................................................................................................  Patient Representative Print Name and Relationship to Patient    ..................................................               ................................................  Date                                            Time    ..........................................................................................................................................  Reviewed by Signature/Title    ...................................................              ..............................................  Date                                                            Time

## 2018-05-14 NOTE — ANESTHESIA POSTPROCEDURE EVALUATION
Patient: Marcia Kauffman    Procedure(s):  O-Arm/Stealth Assisted Lumbar 2 To Sacral 1 Posterior Instrumented Fusion, Pelvic Fixation, Revision Of Lumbar 4-5 Instrumentation, Transforaminal Interbody Fusion with bone morphogenic protein, Oshea-Limon Osteotomies,  Lumbar 2-3, 3-4, 4-5 - Wound Class: I-Clean    Diagnosis:Flatback Syndrome Of Thoracolumbar Region   Diagnosis Additional Information: No value filed.    Anesthesia Type:  General, ETT    Note:  Anesthesia Post Evaluation    Patient location during evaluation: PACU  Patient participation: Able to fully participate in evaluation  Level of consciousness: awake  Pain management: adequate  Airway patency: patent  Cardiovascular status: acceptable  Respiratory status: acceptable  Hydration status: acceptable  PONV: none     Anesthetic complications: None          Last vitals:  Vitals:    05/14/18 1443 05/14/18 1445 05/14/18 1500   BP: 138/79  (!) 126/92   Resp: 12     Temp: 36.2  C (97.2  F)     SpO2: 92% 100% 100%         Electronically Signed By: Rhianna Livingston MD  May 14, 2018  3:22 PM

## 2018-05-14 NOTE — BRIEF OP NOTE
[facility], [place]    Brief Operative Note    Pre-operative diagnosis: Flatback Syndrome Of Thoracolumbar Region   Post-operative diagnosis Flatback Syndrome Of Thoracolumbar Region   Procedure: Procedure(s):  O-Arm/Stealth Assisted Lumbar 2 To Sacral 1 Posterior Instrumented Fusion, Pelvic Fixation, Revision Of Lumbar 4-5 Instrumentation, Transforaminal Interbody Fusion with bone morphogenic protein, Oshea-Limon Osteotomies,  Lumbar 2-3, 3-4, 4-5 - Wound Class: I-Clean  Surgeon: Surgeon(s) and Role:     * Nanette Beckford MD - Primary     * Hector Aldana MD - Assisting     * Pako Gamino MD - Resident - Assisting     * Betzy Rowe PA-C - Assisting  Anesthesia: General   Estimated blood loss: 2175 ml  Cell-Saver: 1030 ml  Drains: Hemovac - suprafascial drain  Specimens: * No specimens in log *  Findings:   flatback syndrome with PI-LL mismatch.  Complications: None.    Plan:  - BMP/CBC/Coag check q6h  - Post-op lumbar AP and Lat x-ray  - Continue Forteo (pt brought her home med; ok to use her home med)  - Continue to hold estradiol/vagifem  - Continue Vitamin D - home dose  - Consult nutrition for protein intake for goal of 1.5g/kg/day for next 3 months

## 2018-05-14 NOTE — IP AVS SNAPSHOT
MRN:[id number]                      After Visit Summary   5/14/2018    Marcia Kauffman    MRN: [id number]           Thank you!     Thank you for choosing [facility] for your care. Our goal is always to provide you with excellent care. Hearing back from our patients is one way we can continue to improve our services. Please take a few minutes to complete the written survey that you may receive in the mail after you visit with us. Thank you!        Patient Information     Date Of Birth          1950        Designated Caregiver       Most Recent Value    Caregiver    Will someone help with your care after discharge? yes    Name of designated caregiver Daughter     Phone number of caregiver same     Caregiver address same       About your hospital stay     You were admitted on:  May 14, 2018 You last received care in the:  [address] [facility]    You were discharged on:  May 17, 2018        Reason for your hospital stay       You underwent lumbar decompression and fusion                  Who to Call     For medical emergencies, please call 911.  For non-urgent questions about your medical care, please call your primary care provider or clinic, [phone]  For questions related to your surgery, please call your surgery clinic        Attending Provider     Provider Specialty    Nanette Beckford MD Neurosurgery       Primary Care Provider Office Phone # Fax #    Zhane Malgorzata Yost [phone]      After Care Instructions     Activity       Your activity upon discharge:   Do not do any bending, twisting, strenuous exercise, or heavy lifting (greater than 10 pounds) for 4-6 weeks. Be careful and ask for assistance when walking or going up and down stairs. Avoid any activities that could result in trauma to the surgical wound. Do not drive within 3 months of having your last seizure or while using narcotics or other sedating medications, such as sleep aids, muscle  relaxants, etc.            Diet       Follow this diet upon discharge: Orders Placed This Encounter      Calorie Counts      Snacks/Supplements Adult: Other - Please comment; Boost Plus - vanilla; Between Meals      Room Service      Advance Diet as Tolerated: Regular Diet Adult            Discharge Instructions       You underwent surgery on your  lumbar spine for fusion by Dr Nanette Beckford.    - You will have follow in 2 weeks with either our nurse practitioner or your  primary care provider for a wound check, then at 6 weeks and 3 months with your surgeon. You will have follow up Xrays at 6 weeks and at 3 months you will have a CT scan prior to your appointment for the surgeon to review.      - If you have not heard from our clinic about your follow up visit by 3-4 days following your discharge, please call our clinic at [phone] to schedule an appointment with the Neurosurgery teams.     - Please avoid taking medications like Advil, Motrin, Ibuprofen, Celebrex, Aleve or aspirin for at least 3 months as these medications can slow the healing and fusion of your spine.     After discharge, your activity restrictions are:   -We encourage short frequent walks, increasing as tolerated.  - Avoid housework, vacuuming, laundry, leaf raking, lawn mowing and snow removal.   - No driving until you are seen in clinic and cleared by your neurosurgeon. You should not drive while on narcotic pain medication.   - No strenuous activity.  - No lifting more than 10 pounds until you are seen in clinic (a gallon of milk weighs approximately 8 pounds)  - You are ok to shower, but do not soak your incisions. Pat them dry if they get damp.   - Avoid coloring your hair or permanent styling until cleared by your surgeon  - No baths, hot tubs or pools for 4-6 weeks after surgery.       Wound cares after surgery  - You are ok to shower, but do not soak your incisions. Pat them dry if they get damp.   - Avoid coloring your hair or  permanent styling until cleared by your surgeon  - No baths, hot tubs or pools for 4-6 weeks after surgery.       Call if you have any of the followin. Temperature greater than 101.5 F.   2. Any redness, swelling or discharge from the wound.   3. Any new weakness, numbness or altered mental status.  4. Worsening pain that is not improving with the pain medications you were prescribed.     Call [phone] or after 5:00 pm or on weekends call [phone] and ask for the neurosurgery resident on call. Thank You.            Wound care and dressings       Instructions to care for your wound at home:  You should remove your dressings and bandages on post-operative day #2. You should then keep the wound undressed and open to air. You are allowed to take showers and get the wound wet starting on post-operative day #3 but you may not scrub or soak the wound or keep it submerged under water. If you do happen to get the wound wet, be sure to pat dry it rather than scrubbing it with a towel.                  Follow-up Appointments     Adult [facility] Follow-up and recommended labs and tests       Follow up in 2 weeks for wound check    Follow up in 6 weeks with Holli Ny PA-C with upright Lumbar x-rays     Appointments on [place] and/or [facility] (with [facility] or [facility] provider or service). Call [phone] if you haven't heard regarding these appointments within 7 days of discharge.                  Additional Services     Physical Therapy Referral       *This therapy referral will be filtered to a centralized scheduling office at [facility] and the patient will receive a call to schedule an appointment at a [facility] location most convenient for them. *     [facility] provides Physical Therapy evaluation and treatment and many specialty services across the [facility] system.  If requesting a specialty program, please choose from the list below.    If you have not  "heard from the scheduling office within 2 business days, please call [phone] for all locations, with the exception of [place], please call [phone] and [place], please call [phone]  Treatment: Evaluation & Treatment  Special Instructions/Modalities: eval and treat  Special Programs: None    Please be aware that coverage of these services is subject to the terms and limitations of your health insurance plan.  Call member services at your health plan with any benefit or coverage questions.      **Note to Provider:  If you are referring outside of [place] for the therapy appointment, please list the name of the location in the \"special instructions\" above, print the referral and give to the patient to schedule the appointment.                  Future tests that were ordered for you     XR Chest 2 Views           Basic metabolic panel           CBC with platelets differential       Last Lab Result: Hemoglobin (g/dL)       Date                     Value                 10/12/2015               12.1             ----------            INR           Partial thromboplastin time           Routine UA with microscopic                 Pending Results     No orders found from 5/12/2018 to 5/15/2018.            Statement of Approval     Ordered          05/17/18 1523  I have reviewed and agree with all the recommendations and orders detailed in this document.  EFFECTIVE NOW     Approved and electronically signed by:  Osiris Pan APRN CNP             Admission Information     Date & Time Provider Department Dept. Phone    5/14/2018 Nanette Beckford MD [address] [facility] [phone]      Your Vitals Were     Blood Pressure Pulse Temperature Respirations Height Weight    108/55 (BP Location: Right arm) 81 98.8  F (37.1  C) (Oral) 16 1.638 m (5' 4.5\") 57 kg (125 lb 10.6 oz)    Pulse Oximetry BMI (Body Mass Index)                97% 21.24 kg/m2          MyChart Information     MyChart " gives you secure access to your electronic health record. If you see a primary care provider, you can also send messages to your care team and make appointments. If you have questions, please call your primary care clinic.  If you do not have a primary care provider, please call [phone] and they will assist you.        Care EveryWhere ID     This is your Care EveryWhere ID. This could be used by other organizations to access your [facility] medical records  [id number]        Equal Access to Services     HIPOLITO HAWK : Hadii jeannie ibrahim hadasho Soomaali, waaxda luqadaha, qaybta kaalmada adeegyada, dora bhatti . So [facility] [phone].    ATENCIÓN: Si habla espchristina, tiene a montez disposición servicios gratuitos de asistencia lingüística. Ll[facility] [phone].    We comply with applicable federal civil rights laws and [place] laws. We do not discriminate on the basis of race, color, national origin, age, disability, sex, sexual orientation, or gender identity.               Review of your medicines      START taking        Dose / Directions    methocarbamol 500 MG tablet   Commonly known as:  ROBAXIN        Dose:  500 mg   Take 1 tablet (500 mg) by mouth 4 times daily as needed for muscle spasms   Quantity:  45 tablet   Refills:  0       oxyCODONE IR 5 MG tablet   Commonly known as:  ROXICODONE        Dose:  5-10 mg   Take 1-2 tablets (5-10 mg) by mouth every 4 hours as needed for other (pain control or improvement in physical function. Hold dose for analgesic side effects.)   Quantity:  60 tablet   Refills:  0       polyethylene glycol Packet   Commonly known as:  MIRALAX/GLYCOLAX        Dose:  17 g   Take 17 g by mouth 3 times daily   Quantity:  7 packet   Refills:  1       senna-docusate 8.6-50 MG per tablet   Commonly known as:  SENOKOT-S;PERICOLACE        Dose:  2 tablet   Take 2 tablets by mouth 2 times daily   Quantity:  30 tablet   Refills:  0         CONTINUE these medicines which  may have CHANGED, or have new prescriptions. If we are uncertain of the size of tablets/capsules you have at home, strength may be listed as something that might have changed.        Dose / Directions    montelukast 10 MG tablet   Commonly known as:  SINGULAIR   This may have changed:    - when to take this  - reasons to take this   Used for:  Allergic urticaria        Dose:  10 mg   Take 1 tablet (10 mg) by mouth daily   Quantity:  30 tablet   Refills:  11       Omega-3 Fish Oil 1200 MG Caps   This may have changed:  medication strength        Dose:  1200 mg   Start taking on:  5/31/2018   Take 1,200 mg by mouth 2 times daily (with meals) ON HOLD FOR SURGERY SINCE 05/01/2018   Refills:  0       valACYclovir 500 MG tablet   Commonly known as:  VALTREX   This may have changed:    - when to take this  - additional instructions   Used for:  Herpes simplex virus (HSV) infection        Dose:  500 mg   Take 1 tablet (500 mg) by mouth 2 times daily For 3 days when flares occur   Quantity:  30 tablet   Refills:  3         CONTINUE these medicines which have NOT CHANGED        Dose / Directions    ascorbic acid 500 MG tablet   Commonly known as:  VITAMIN C        2 tab daily AT BREAKFAST   Refills:  0       CO Q 10 PO        Dose:  1 chew tab   Take 1 chew tab by mouth every morning   Refills:  0       DAILY MULTI VITAMIN/MINERALS PO        1 TABLET DAILY AT BREAKFAST ON HOLD FOR SURGERY SINCE 05/01/2018   Refills:  0       FLAX SEED OIL PO        takes in her cereal. STIOPPED 05/01/2018   Refills:  0       FORTEO 600 MCG/2.4ML Soln injection   Generic drug:  teriparatide (recombinant)        Inject Subcutaneous every morning   Refills:  0       glucosamine chondroitin 500 complex Caps        1 capsule daily at noon   Refills:  0       magnesium citrate solution        Dose:  296 mL   Take 296 mLs by mouth At Bedtime   Refills:  0       PROBIOTIC PO        Take by mouth every other day   Refills:  0       UNABLE TO FIND         Dose:  0.25 tsp.   Apply 0.25 tsp. topically 2 times daily MEDICATION NAME: Bio- estrol, plant based hormone replacement therapy   Refills:  0       * UNCLASSIFIED OTC PRODUCT Misc        Calcium AEP 1-2 capsules by mouth daily ?Supports cell membranes* TID   Refills:  0       * UNCLASSIFIED OTC PRODUCT Misc        RelaxaMag? 1 capsule at bedtime as needed -  a unique magnesium supplement that helps improve sleep and stress tolerance in the evening   Refills:  0       * Notice:  This list has 2 medication(s) that are the same as other medications prescribed for you. Read the directions carefully, and ask your doctor or other care provider to review them with you.      STOP taking     ibuprofen 200 MG tablet   Commonly known as:  ADVIL/MOTRIN           nabumetone 500 MG tablet   Commonly known as:  RELAFEN                Where to get your medicines      These medications were sent to [facility] [place], [place] - [address] [address]     Phone:  [phone]     methocarbamol 500 MG tablet    polyethylene glycol Packet    senna-docusate 8.6-50 MG per tablet         Some of these will need a paper prescription and others can be bought over the counter. Ask your nurse if you have questions.     Bring a paper prescription for each of these medications     oxyCODONE IR 5 MG tablet                Protect others around you: Learn how to safely use, store and throw away your medicines at www.disposemymeds.org.        Information about OPIOIDS     PRESCRIPTION OPIOIDS: WHAT YOU NEED TO KNOW   You have a prescription for an opioid (narcotic) pain medicine. Opioids can cause addiction. If you have a history of chemical dependency of any type, you are at a higher risk of becoming addicted to opioids. Only take this medicine after all other options have been tried. Take it for as short a time and as few doses as possible.     Do not:    Drive. If you drive while  taking these medicines, you could be arrested for driving under the influence (DUI).    Operate heavy machinery    Do any other dangerous activities while taking these medicines.     Drink any alcohol while taking these medicines.      Take with any other medicines that contain acetaminophen. Read all labels carefully. Look for the word  acetaminophen  or  Tylenol.  Ask your pharmacist if you have questions or are unsure.    Store your pills in a secure place, locked if possible. We will not replace any lost or stolen medicine. If you don t finish your medicine, please throw away (dispose) as directed by your pharmacist. The [place] Pollution Control Agency has more information about safe disposal: https://www.pca.[facility].[place].us/living-green/managing-unwanted-medications    All opioids tend to cause constipation. Drink plenty of water and eat foods that have a lot of fiber, such as fruits, vegetables, prune juice, apple juice and high-fiber cereal. Take a laxative (Miralax, milk of magnesia, Colace, Senna) if you don t move your bowels at least every other day.              Medication List: This is a list of all your medications and when to take them. Check marks below indicate your daily home schedule. Keep this list as a reference.      Medications           Morning Afternoon Evening Bedtime As Needed    ascorbic acid 500 MG tablet   Commonly known as:  VITAMIN C   2 tab daily AT BREAKFAST   Last time this was given:  1,000 mg on 5/17/2018  8:20 AM                                CO Q 10 PO   Take 1 chew tab by mouth every morning                                DAILY MULTI VITAMIN/MINERALS PO   1 TABLET DAILY AT BREAKFAST ON HOLD FOR SURGERY SINCE 05/01/2018   Last time this was given:  1 tablet on 5/17/2018  8:21 AM                                FLAX SEED OIL PO   takes in her cereal. STIOPPED 05/01/2018                                FORTEO 600 MCG/2.4ML Soln injection   Inject Subcutaneous every morning   Last  time this was given:  20 mcg on 5/17/2018  8:39 AM   Generic drug:  teriparatide (recombinant)                                glucosamine chondroitin 500 complex Caps   1 capsule daily at noon                                magnesium citrate solution   Take 296 mLs by mouth At Bedtime   Last time this was given:  296 mLs on 5/16/2018  8:45 PM                                methocarbamol 500 MG tablet   Commonly known as:  ROBAXIN   Take 1 tablet (500 mg) by mouth 4 times daily as needed for muscle spasms   Last time this was given:  500 mg on 5/17/2018  8:20 AM                                montelukast 10 MG tablet   Commonly known as:  SINGULAIR   Take 1 tablet (10 mg) by mouth daily                                Omega-3 Fish Oil 1200 MG Caps   Take 1,200 mg by mouth 2 times daily (with meals) ON HOLD FOR SURGERY SINCE 05/01/2018   Start taking on:  5/31/2018                                oxyCODONE IR 5 MG tablet   Commonly known as:  ROXICODONE   Take 1-2 tablets (5-10 mg) by mouth every 4 hours as needed for other (pain control or improvement in physical function. Hold dose for analgesic side effects.)   Last time this was given:  5 mg on 5/17/2018  2:37 AM                                polyethylene glycol Packet   Commonly known as:  MIRALAX/GLYCOLAX   Take 17 g by mouth 3 times daily   Last time this was given:  17 g on 5/17/2018  8:21 AM                                PROBIOTIC PO   Take by mouth every other day                                senna-docusate 8.6-50 MG per tablet   Commonly known as:  SENOKOT-S;PERICOLACE   Take 2 tablets by mouth 2 times daily   Last time this was given:  2 tablets on 5/17/2018  8:20 AM                                UNABLE TO FIND   Apply 0.25 tsp. topically 2 times daily MEDICATION NAME: Bio- estrol, plant based hormone replacement therapy   Last time this was given:  5/17/2018  7:05 AM                                * UNCLASSIFIED OTC PRODUCT Misc   Calcium AEP 1-2  capsules by mouth daily ?Supports cell membranes* TID                                * UNCLASSIFIED OTC PRODUCT Misc   RelaxaMag? 1 capsule at bedtime as needed -  a unique magnesium supplement that helps improve sleep and stress tolerance in the evening                                valACYclovir 500 MG tablet   Commonly known as:  VALTREX   Take 1 tablet (500 mg) by mouth 2 times daily For 3 days when flares occur   Last time this was given:  500 mg on 5/17/2018  8:20 AM                                * Notice:  This list has 2 medication(s) that are the same as other medications prescribed for you. Read the directions carefully, and ask your doctor or other care provider to review them with you.

## 2018-05-14 NOTE — OR NURSING
Neuro status equal grasps, equal dorsi/plantar flexion.  States sensation is intact.  Since patient has become more awake, left SLR seems slightly less than right, patient states thos is not a  change from preop.  States ice is helpful for low back incisional discomfort.

## 2018-05-15 ENCOUNTER — APPOINTMENT (OUTPATIENT)
Dept: PHYSICAL THERAPY | Facility: CLINIC | Age: 68
DRG: 454 | End: 2018-05-15
Attending: NURSE PRACTITIONER
Payer: COMMERCIAL

## 2018-05-15 ENCOUNTER — APPOINTMENT (OUTPATIENT)
Dept: GENERAL RADIOLOGY | Facility: CLINIC | Age: 68
DRG: 454 | End: 2018-05-15
Attending: NURSE PRACTITIONER
Payer: COMMERCIAL

## 2018-05-15 LAB
ANION GAP SERPL CALCULATED.3IONS-SCNC: 6 MMOL/L (ref 3–14)
ANION GAP SERPL CALCULATED.3IONS-SCNC: 7 MMOL/L (ref 3–14)
APTT PPP: 30 SEC (ref 22–37)
BASOPHILS # BLD AUTO: 0 10E9/L (ref 0–0.2)
BASOPHILS NFR BLD AUTO: 0.1 %
BLD PROD TYP BPU: NORMAL
BLD PROD TYP BPU: NORMAL
BLD UNIT ID BPU: 0
BLOOD PRODUCT CODE: NORMAL
BPU ID: NORMAL
BUN SERPL-MCNC: 17 MG/DL (ref 7–30)
BUN SERPL-MCNC: 20 MG/DL (ref 7–30)
CALCIUM SERPL-MCNC: 8 MG/DL (ref 8.5–10.1)
CALCIUM SERPL-MCNC: 8.3 MG/DL (ref 8.5–10.1)
CHLORIDE SERPL-SCNC: 107 MMOL/L (ref 94–109)
CHLORIDE SERPL-SCNC: 108 MMOL/L (ref 94–109)
CO2 SERPL-SCNC: 25 MMOL/L (ref 20–32)
CO2 SERPL-SCNC: 27 MMOL/L (ref 20–32)
CREAT SERPL-MCNC: 0.74 MG/DL (ref 0.52–1.04)
CREAT SERPL-MCNC: 0.79 MG/DL (ref 0.52–1.04)
DIFFERENTIAL METHOD BLD: ABNORMAL
EOSINOPHIL # BLD AUTO: 0 10E9/L (ref 0–0.7)
EOSINOPHIL NFR BLD AUTO: 0 %
ERYTHROCYTE [DISTWIDTH] IN BLOOD BY AUTOMATED COUNT: 13.9 % (ref 10–15)
GFR SERPL CREATININE-BSD FRML MDRD: 72 ML/MIN/1.7M2
GFR SERPL CREATININE-BSD FRML MDRD: 78 ML/MIN/1.7M2
GLUCOSE SERPL-MCNC: 107 MG/DL (ref 70–99)
GLUCOSE SERPL-MCNC: 156 MG/DL (ref 70–99)
HCT VFR BLD AUTO: 29.4 % (ref 35–47)
HGB BLD-MCNC: 10 G/DL (ref 11.7–15.7)
IMM GRANULOCYTES # BLD: 0 10E9/L (ref 0–0.4)
IMM GRANULOCYTES NFR BLD: 0.2 %
INR PPP: 1.28 (ref 0.86–1.14)
INTERPRETATION ECG - MUSE: NORMAL
LYMPHOCYTES # BLD AUTO: 1.7 10E9/L (ref 0.8–5.3)
LYMPHOCYTES NFR BLD AUTO: 12.9 %
MAGNESIUM SERPL-MCNC: 1.2 MG/DL (ref 1.6–2.3)
MAGNESIUM SERPL-MCNC: 1.4 MG/DL (ref 1.6–2.3)
MAGNESIUM SERPL-MCNC: 2.5 MG/DL (ref 1.6–2.3)
MCH RBC QN AUTO: 32.5 PG (ref 26.5–33)
MCHC RBC AUTO-ENTMCNC: 34 G/DL (ref 31.5–36.5)
MCV RBC AUTO: 96 FL (ref 78–100)
MONOCYTES # BLD AUTO: 1 10E9/L (ref 0–1.3)
MONOCYTES NFR BLD AUTO: 7.4 %
NEUTROPHILS # BLD AUTO: 10.5 10E9/L (ref 1.6–8.3)
NEUTROPHILS NFR BLD AUTO: 79.4 %
NRBC # BLD AUTO: 0 10*3/UL
NRBC BLD AUTO-RTO: 0 /100
NUM BPU REQUESTED: 1
PHOSPHATE SERPL-MCNC: 2.8 MG/DL (ref 2.5–4.5)
PHOSPHATE SERPL-MCNC: 3.2 MG/DL (ref 2.5–4.5)
PLATELET # BLD AUTO: 138 10E9/L (ref 150–450)
POTASSIUM SERPL-SCNC: 3.8 MMOL/L (ref 3.4–5.3)
POTASSIUM SERPL-SCNC: 4.1 MMOL/L (ref 3.4–5.3)
RBC # BLD AUTO: 3.08 10E12/L (ref 3.8–5.2)
SODIUM SERPL-SCNC: 139 MMOL/L (ref 133–144)
SODIUM SERPL-SCNC: 140 MMOL/L (ref 133–144)
TRANSFUSION STATUS PATIENT QL: NORMAL
TRANSFUSION STATUS PATIENT QL: NORMAL
WBC # BLD AUTO: 13.2 10E9/L (ref 4–11)

## 2018-05-15 PROCEDURE — 12000008 ZZH R&B INTERMEDIATE UMMC

## 2018-05-15 PROCEDURE — 97530 THERAPEUTIC ACTIVITIES: CPT | Mod: GP | Performed by: REHABILITATION PRACTITIONER

## 2018-05-15 PROCEDURE — 40000141 ZZH STATISTIC PERIPHERAL IV START W/O US GUIDANCE

## 2018-05-15 PROCEDURE — 85730 THROMBOPLASTIN TIME PARTIAL: CPT | Performed by: NURSE PRACTITIONER

## 2018-05-15 PROCEDURE — P9037 PLATE PHERES LEUKOREDU IRRAD: HCPCS | Performed by: STUDENT IN AN ORGANIZED HEALTH CARE EDUCATION/TRAINING PROGRAM

## 2018-05-15 PROCEDURE — 80048 BASIC METABOLIC PNL TOTAL CA: CPT | Performed by: NURSE PRACTITIONER

## 2018-05-15 PROCEDURE — 25000128 H RX IP 250 OP 636: Performed by: NURSE PRACTITIONER

## 2018-05-15 PROCEDURE — 40000193 ZZH STATISTIC PT WARD VISIT: Performed by: REHABILITATION PRACTITIONER

## 2018-05-15 PROCEDURE — 97116 GAIT TRAINING THERAPY: CPT | Mod: GP | Performed by: REHABILITATION PRACTITIONER

## 2018-05-15 PROCEDURE — 85610 PROTHROMBIN TIME: CPT | Performed by: NURSE PRACTITIONER

## 2018-05-15 PROCEDURE — 36415 COLL VENOUS BLD VENIPUNCTURE: CPT | Performed by: NURSE PRACTITIONER

## 2018-05-15 PROCEDURE — 97162 PT EVAL MOD COMPLEX 30 MIN: CPT | Mod: GP | Performed by: REHABILITATION PRACTITIONER

## 2018-05-15 PROCEDURE — 36415 COLL VENOUS BLD VENIPUNCTURE: CPT | Performed by: NEUROLOGICAL SURGERY

## 2018-05-15 PROCEDURE — 25000125 ZZHC RX 250: Performed by: NURSE PRACTITIONER

## 2018-05-15 PROCEDURE — 85025 COMPLETE CBC W/AUTO DIFF WBC: CPT | Performed by: NURSE PRACTITIONER

## 2018-05-15 PROCEDURE — 25000128 H RX IP 250 OP 636: Performed by: STUDENT IN AN ORGANIZED HEALTH CARE EDUCATION/TRAINING PROGRAM

## 2018-05-15 PROCEDURE — 25000132 ZZH RX MED GY IP 250 OP 250 PS 637: Performed by: NURSE PRACTITIONER

## 2018-05-15 PROCEDURE — 83735 ASSAY OF MAGNESIUM: CPT | Performed by: NEUROLOGICAL SURGERY

## 2018-05-15 PROCEDURE — 25000132 ZZH RX MED GY IP 250 OP 250 PS 637: Performed by: STUDENT IN AN ORGANIZED HEALTH CARE EDUCATION/TRAINING PROGRAM

## 2018-05-15 PROCEDURE — 83735 ASSAY OF MAGNESIUM: CPT | Performed by: NURSE PRACTITIONER

## 2018-05-15 PROCEDURE — 84100 ASSAY OF PHOSPHORUS: CPT | Performed by: NURSE PRACTITIONER

## 2018-05-15 PROCEDURE — 40000802 ZZH SITE CHECK

## 2018-05-15 PROCEDURE — 72100 X-RAY EXAM L-S SPINE 2/3 VWS: CPT

## 2018-05-15 RX ORDER — MAGNESIUM SULFATE HEPTAHYDRATE 40 MG/ML
4 INJECTION, SOLUTION INTRAVENOUS EVERY 4 HOURS PRN
Status: DISCONTINUED | OUTPATIENT
Start: 2018-05-15 | End: 2018-05-17 | Stop reason: HOSPADM

## 2018-05-15 RX ORDER — POTASSIUM CHLORIDE 750 MG/1
20-40 TABLET, EXTENDED RELEASE ORAL
Status: DISCONTINUED | OUTPATIENT
Start: 2018-05-15 | End: 2018-05-17 | Stop reason: HOSPADM

## 2018-05-15 RX ORDER — POTASSIUM CHLORIDE 29.8 MG/ML
20 INJECTION INTRAVENOUS
Status: DISCONTINUED | OUTPATIENT
Start: 2018-05-15 | End: 2018-05-15 | Stop reason: RX

## 2018-05-15 RX ORDER — POTASSIUM CHLORIDE 7.45 MG/ML
10 INJECTION INTRAVENOUS
Status: DISCONTINUED | OUTPATIENT
Start: 2018-05-15 | End: 2018-05-17 | Stop reason: HOSPADM

## 2018-05-15 RX ORDER — POTASSIUM CHLORIDE 1.5 G/1.58G
20-40 POWDER, FOR SOLUTION ORAL
Status: DISCONTINUED | OUTPATIENT
Start: 2018-05-15 | End: 2018-05-16 | Stop reason: RX

## 2018-05-15 RX ORDER — POTASSIUM CL/LIDO/0.9 % NACL 10MEQ/0.1L
10 INTRAVENOUS SOLUTION, PIGGYBACK (ML) INTRAVENOUS
Status: DISCONTINUED | OUTPATIENT
Start: 2018-05-15 | End: 2018-05-16 | Stop reason: RX

## 2018-05-15 RX ADMIN — PROCHLORPERAZINE EDISYLATE 5 MG: 5 INJECTION INTRAMUSCULAR; INTRAVENOUS at 17:19

## 2018-05-15 RX ADMIN — ONDANSETRON 4 MG: 4 TABLET, ORALLY DISINTEGRATING ORAL at 08:19

## 2018-05-15 RX ADMIN — OXYCODONE HYDROCHLORIDE AND ACETAMINOPHEN 1000 MG: 500 TABLET ORAL at 08:22

## 2018-05-15 RX ADMIN — METHOCARBAMOL 500 MG: 500 TABLET ORAL at 08:19

## 2018-05-15 RX ADMIN — OXYCODONE HYDROCHLORIDE 5 MG: 5 TABLET ORAL at 01:31

## 2018-05-15 RX ADMIN — POLYETHYLENE GLYCOL 3350 17 G: 17 POWDER, FOR SOLUTION ORAL at 08:22

## 2018-05-15 RX ADMIN — MULTIPLE VITAMINS W/ MINERALS TAB 1 TABLET: TAB at 08:22

## 2018-05-15 RX ADMIN — SENNOSIDES AND DOCUSATE SODIUM 2 TABLET: 8.6; 5 TABLET ORAL at 20:47

## 2018-05-15 RX ADMIN — OXYCODONE HYDROCHLORIDE 5 MG: 5 TABLET ORAL at 08:19

## 2018-05-15 RX ADMIN — ACETAMINOPHEN 975 MG: 325 TABLET, FILM COATED ORAL at 01:31

## 2018-05-15 RX ADMIN — ACETAMINOPHEN 975 MG: 325 TABLET, FILM COATED ORAL at 11:16

## 2018-05-15 RX ADMIN — SODIUM CHLORIDE: 9 INJECTION, SOLUTION INTRAVENOUS at 01:32

## 2018-05-15 RX ADMIN — MENTHOL 1 PATCH: 205.5 PATCH TOPICAL at 15:58

## 2018-05-15 RX ADMIN — POLYETHYLENE GLYCOL 3350 17 G: 17 POWDER, FOR SOLUTION ORAL at 20:47

## 2018-05-15 RX ADMIN — HYDROMORPHONE HYDROCHLORIDE 0.3 MG: 1 INJECTION, SOLUTION INTRAMUSCULAR; INTRAVENOUS; SUBCUTANEOUS at 17:19

## 2018-05-15 RX ADMIN — METHOCARBAMOL 500 MG: 500 TABLET ORAL at 15:48

## 2018-05-15 RX ADMIN — VALACYCLOVIR HYDROCHLORIDE 500 MG: 500 TABLET, FILM COATED ORAL at 08:21

## 2018-05-15 RX ADMIN — OXYCODONE HYDROCHLORIDE 5 MG: 5 TABLET ORAL at 21:56

## 2018-05-15 RX ADMIN — ACETAMINOPHEN 975 MG: 325 TABLET, FILM COATED ORAL at 17:19

## 2018-05-15 RX ADMIN — MAGNESIUM SULFATE HEPTAHYDRATE 4 G: 40 INJECTION, SOLUTION INTRAVENOUS at 04:59

## 2018-05-15 RX ADMIN — SODIUM CHLORIDE: 9 INJECTION, SOLUTION INTRAVENOUS at 16:08

## 2018-05-15 ASSESSMENT — VISUAL ACUITY
OU: NORMAL ACUITY

## 2018-05-15 NOTE — PLAN OF CARE
Problem: Patient Care Overview  Goal: Plan of Care/Patient Progress Review  PT/6A    Discharge Planner PT   Patient plan for discharge: pt very attached to plan of TCU (already packed for TCU, & has visited the TCU)  Current status: Pt SBA for bed mobility and transfers. Pt ambulated total of ~200' with FWW and CGA. Pt ascended and descended 9 stairs with railing and CGA.  Barriers to return to prior living situation: decreased activity tolerance, stairs, pain, decreased strength/balance  Recommendations for discharge: TCU if discharges within 24hrs, otherwise anticipate home w/ outpatient PT  Rationale for recommendations: Currently pt would benefit from short rehab stay to increase activity tolerance and strength before return to prior living situation. It is anticipated with pts continued participation in therapy and pending pain management pt will likely progress to discharge home with OP PT.       Entered by: Citlaly Amaral 05/15/2018 4:14 PM

## 2018-05-15 NOTE — PLAN OF CARE
Problem: Patient Care Overview  Goal: Individualization & Mutuality  Outcome: Improving  S/p L-2-L4 lumbar fusion POD#1.Alert and oriented x 4. Vital signs stable. On room air. Neuro intact except left knee/thigh numbness. Team aware during morning round, they said it will resolve in 4-6 weeks. Back pain fully managed with oral pain medications and cold pack. Transfers to chair with assist of one and gait belt. Right mid back Hemovac has bloody output. Ace removed at 11 AM and patient has not voided yet. Good oral intake. Resting between care. Sat on chair for lunch.  present at the bedside. Plan:Continue care.    Problem: Pain, Acute (Adult)  Goal: Identify Related Risk Factors and Signs and Symptoms  Related risk factors and signs and symptoms are identified upon initiation of Human [employer] Clinical Practice Guideline (CPG).   Outcome: Improving   05/15/18 1241   Pain, Acute   Related Risk Factors (Acute Pain) procedure/treatment   Signs and Symptoms (Acute Pain) verbalization of pain descriptors;pacing/restlessness

## 2018-05-15 NOTE — PROGRESS NOTES
Neurosurgery Progress Note     S: Continued b/l leg aching postoperatively, pain tolerated with pain medication.     O:  Exam:  General: Awake;  Alert, In No Acute Distress  Pulm: Breathing Comfortably on RA  Mental status: Oriented x 3  Cranial Nerves: Cranial Nerves II-XII Grossly Intact Bilaterally  Strength:      Del Tr Bi WE WF Gr  R 5 5 5 5 5 5  L 5 5 5 5 5 5     HF KE KF DF PF EHL  R 5 5 5 5 5 5  L 5 5 5 5 5 5    Pronator Drift: Absent  Sensory: Decreased over left thigh  Reflexes: No Hyperreflexia, Tena s or Clonus Present; Toes Down-Going Bilaterally  INCISION: c/d/i, covered with aquacell    A: Doing well post-operatively. Transfused 1U plts overnight. All cell lines dropped, possibly dilutional. Continuing to follow.    P:  Operation: Status post L2-S1 Posterior Instrumented Fusion and Correction of Flat Back Syndrome   Sutures out: 5/28/2018  Dressing change: 5/19  Drains: Suprafascial Lumbar Hemovac; Please empty and record output every 8 hours  Imaging: Needs Upright AP/Lateral Lumbosacral X-Rays  Pain: Controlled; On Dilaudid 0.3-0.5 mg Q 2 HR PRN IVP; Oxycodone 5-10 mg Q 4 HR PRN; Robaxin 500 mg QID PRN  Blood pressure goals: SBP < 160 mmHg  Diet: Nutrition Consult; Regular Diet with 1.5G/KG protein/day for 3 months  Hematological goals: Platelets > 100k, INR < 1.5, Hemoglobin > 8; CBC, PT, INR Q 6 HR  Endo: Osteoporosis; Continue home Forteo Injections 600 mcg daily, hold estrogen cream  PT/OT: Consults Placed; Evaluations Pending  DVT prophylaxis: Sequential compression devices to bilateral lower extremities  Ulcer prophylaxis: none indicated  DISPO:  Anticipate D/C Home 5/18/2018  Barriers: Surgical drain, evaluation by therapies, ambulating, BM/flatus, voiding without a Ace, pain controlled on PO medications    Saadia Banegas MD  Neurosurgery PGY3

## 2018-05-15 NOTE — PLAN OF CARE
Problem: Patient Care Overview  Goal: Plan of Care/Patient Progress Review  OT 6A: Cancel and hold. Per PT, patient peforming well during initial evaluation may need only one discipline during inpatient hospital stay. OT to hold evaluation and initiate as appropriate.

## 2018-05-15 NOTE — PROGRESS NOTES
SPIRITUAL HEALTH SERVICES  SPIRITUAL ASSESSMENT Progress Note  [facility] ([place]) 6A     REFERRAL SOURCE: [facility] request for hospital     Attempted to visit pt, who was busy with another care provider.  Will try again tomorrow.    PLAN: Visit pt tomorrow 5-16.    Dominga Beal  Chaplain Resident  Pager [phone]

## 2018-05-15 NOTE — PLAN OF CARE
"Problem: Patient Care Overview  Goal: Plan of Care/Patient Progress Review  Pt arrived from PACU approx 1730, POD#0 s/p lumbar surgery (see procedure note). Aquacel dressing in place, CDI. Hemovac to R of dressing, CDI, output of 10cc bright red/bloody. Ice packs on back. Aqua-K pad ordered and applied in cool setting with ice. Pt pain controlled with oxy 5mg, tylenol, robaxin. Lidocaine patches in place, 2 on back, 1 on L hip. Nausea controlled with compazine. VSS on RA. Capno on/off d/t pt pulling at it and getting tangled in cords. Cont pulse ox always on. Pt repositions very frequently, pt can repo independently but needs to be monitored closely for getting cords tangled. Ace in place, adequate UOP. Commode ordered for when Ace d/c'ed. EBL of 2175 in OR, Hgb Q6hrs, stable. MIVF via PIV at 100ml/hr. BA on. SCDs off d/t pt refusal, education completed. Neuros with LLE 4/5 d/t pain of baseline \"pinched nerve\" in L groin area, also small \"patch of numbness on L outer thigh\" this is baseline also per pt. Reg diet, tolerating fair, only chicken noodle soup ordered. Cont with POC.       "

## 2018-05-15 NOTE — OP NOTE
Procedure Date: 05/14/2018      PREOPERATIVE DIAGNOSES:   1.  Flat back syndrome.   2.  Previous L4-L5 posterior decompression and posterior lateral instrumented fusion.   3.  Osteoporosis.      POSTOPERATIVE DIAGNOSES:     1.  Flat back syndrome.    2.  Previous L4-L5 posterior decompression and posterior lateral instrumented fusion.   3.  Osteoporosis.      PROCEDURE PERFORMED:   1.  Stealth-guided L2 through sacrum posterior lateral instrumented fusion with local harvest autograft and allograft.   2.  Removal and replacement of bilateral L4-L5 instrumentation.   3.  Pelvic fixation with S2 alar iliac screws.   4.  Bilateral transforaminal lumbar interbody fusion L2-L3, L3-L4, L4-L5.   5.  Bilateral Oshea-Limon osteotomies for deformity correction, bilateral L2-L3, L3-L4 and L4-L5.      SURGEON:  Nanette Beckford MD.      COSURGEON:  Hector Aldana MD.      ASSISTANT:  Pako Gamino MD and Betzy Rowe PA-C.      INDICATIONS FOR SURGERY:  Marcia Kauffman is a 67-year-old woman who presented with a prior posterior lateral instrumented fusion at L4-L5 and transitional segment anatomy with fused disk spaces at L5-S1 and rudimentary S1-S2 disc space.  She had a pelvic incidence of 62 degrees and a lumbar lordosis of 36 degrees for significant pelvic incidence to lumbar lordosis mismatch.  The patient had symptomatic standing and ambulation intolerance with severe back pain.  She was counseled as to risks versus benefits of revision posterior fusion with potential extension up into the thoracic spine versus potential for correction being obtained solely in the lumbar spine.  After a full discussion of risks versus benefits, she elected to proceed.  Of note, she was diagnosed with osteoporosis and was maintained on teriparatide for several months prior to proceeding with surgery in order to improve her bone quality.      A 2 surgeon approach was used to minimize operative blood loss and operative time given the  complex deformity correction required.      OPERATIVE COURSE AND INTRAOPERATIVE FINDINGS:  The patient was identified and informed consent was verified.  She was taken to Operating Room 21 where general endotracheal anesthesia was induced.  A Ace catheter was placed after the induction of anesthesia, an arterial line was placed for monitoring mean arterial pressure, and 2 large bore IVs were placed.  A tranexamic acid bolus and infusion was begun prior to skin incision.  Perioperative antibiotics were administered within 1 hour of skin incision.  The patient was positioned prone on the proAXIS table, which was flexed to 15 degrees to facilitate operative exposure.  Her thoracolumbar spine was prepped and draped in standard sterile fashion and an intraoperative timeout was performed.  The patient's preexisting lumbar midline incision was opened and extended cephalad and caudad using a 10 blade scalpel after instilling 10 mL of bupivacaine for local anesthesia.  As neuro monitoring signals were symmetric and followable, neuromuscular relaxation was obtained for facilitation of exposure.  Midline dissection was carried out and the previous laminectomy sites at L4 and L5 were identified with careful subperiosteal dissection carried out to expose L2 through the sacrum.  We planned to perform instrumentation from L2 to the sacrum and perform our osteotomies and interbody fusions at those levels and assess our deformity correction prior to proceeding if needed up into the thoracic spine for further deformity correction.      The preexisting instrumentation was identified and the set plugs and rods were removed.  The screws were then removed and the sizes noted.  The screws were upsized bilaterally using the [employer] Solera 5.56.0 system.  A screw base reference frame was attached and the [employer] imaging system was brought into the field.  The O-arm was used to take neuronavigation CT spins and facilitate pedicle  screw placement bilaterally at L2 and L3 as well as confirmatory L4 and L5 and place bilateral pedicle screws at S1.  We also used the O-arm to place pelvic fixation using S2 alar iliac trajectory screws bilaterally.  The neuronavigation was used to identify a starting point followed by creation of the intended tract with a sharp tipped awl followed by under tapping the screw size followed by placement of the screws.  The [place] Solera system was used throughout.  Screw sizes were as follows:  Bilateral L2 5.5 x 50 mm, bilateral L3 6.5 x 50 mm, bilateral L4 7.5 x 45 mm, bilateral L5 7.5 x 45 mm, left S1 7.5 x 45 mm, right S1 7.5 x 50 mm, bilateral S2 alar iliac screws 9.5 x 100 mm.  Screw location was verified using CT scan for L4 through the pelvis and using AP and lateral fluoroscopy for L2 and L3.  Once instrumentation was verified to be in the correct trajectory, we then proceeded with revision decompression and bilateral Smith-Limon osteotomies at L4 and L5.  There was dense thick adherent scar around the dura from the previous laminectomy sites at L3 through L5.  The patient had very large epidural veins and very vascular epidural scar in those levels.  A significant amount of time was used with bipolar electrocautery for hemostasis as well as using thrombin-soaked Gelfoam as well as Surgiflo to assist with hemostasis.  There was a significant amount of bleeding throughout the patient's bone, likely due to the osteoporosis as well as to the extensive epidural scar which was quite prominent.  We were eventually able to completely resect bilaterally the L4-L5 facets and save the bone for local harvest autograft.  We identified the disk space and again after encountering numerous epidural veins which were controlled eventually using bipolar electrocautery after carefully localizing the exiting nerve roots to ensure that these were not compromised with electrocautery, we entered the disk space bilaterally at  L4 and L5 using a 15 blade scalpel followed by sharp tipped osteotomes to enter the disc space, and remove the cartilaginous endplate from the bone.  We used downward facing curets and insert rotate distractors to loosen remaining disk space and ensured release of the lateral annulus to allow for full deformity correction.  The disk was resected using pituitary rongeurs.  We used progressive insert rotate distractors and eventually were able to take the L4-L5 level from a kyphotic spondylolisthesis to a lordotic normal alignment.  Capstone control interbodies were selected using fluoroscopy to verify trajectory and size and 13 x 22 mm x 18 degree lordotic implants were selected and one was placed on the left and one was placed on the right at L4-L5 after being filled with bone morphogenic protein.  Local harvest autograft was placed into the disc space for further interbody fusion.  Once we completed the L4-L5 level we then proceeded to the L3-L4 level and again performed bilateral Oshea-Limon osteotomies for deformity correction and again performed bilateral transforaminal lumbar interbody fusion approach with bilateral cage insertion for maximum lordosis and interbody fusion.  Again, we encountered extensive epidural veins from the patient's previous decompression at this level and again a significant amount of time was spent in epidural adhesiolysis as well as hemostasis.  We eventually selected bilateral Capstone control 13 x 22 mm x 18 degree lordotic cages filled with bone morphogenic protein and local harvest autograft in the disk space and placed this under fluoroscopic visualization into the disk space with bilateral interbody implants at L3-L4.  We then proceeded to perform an identical procedure at L2-L3, again with bilateral Smith-Limon osteotomies and again with bilateral interbody cage insertion for restoration of interbody height and lordosis, again using 13 mm x 22 mm x 18 degree lordotic  interbody implants, 1 on the right and 1 on the left. We progressively extended the ProAxis table in 5-degree increments starting at 15 degrees flexion and ending at 5 degrees extension, to close the osteotomy sites without compressing across the screws due to patient's poor bone quality.     Using fluoroscopy, we evaluated the patient's lordosis with long stitched cassette imaging.  We obtained a lumbar lordosis of 53 degrees, which was within 10 degrees of her pelvic incidence of 62 degrees.  Therefore, we accepted the deformity correction without extension up into the thoracic spine and concluded our procedure with placing rods bilaterally from L2 through the pelvic screws and provisionally tightening set plugs and again checking AP and lateral fluoroscopic views.      Once we deemed to the alignment to be adequate the set plugs were torqued off to the 's recommended torque and the incision was irrigated with 2 liters of antibiotic-impregnated saline.  A high-speed electric drill was used to perform posterolateral element decortication and a mixture of local harvest autograft as well as allograft was used for posterolateral fusion from L2 through the sacrum.  Two grams of vancomycin powder were placed within the incision and the incision was closed with 0 Vicryl interrupted sutures in the fascia followed by 0 Vicryl running suture in the fascia, followed by a Hemovac drain in the suprafascial space, followed by 2-0 Vicryl inverted interrupted sutures in the subcutaneous layer, followed by 2-0 Vicryl running suture in the subcutaneous layer, followed by 4-0 Monocryl subcuticular stitch on the skin, Dermabond and a sterile dressing as well as a drain sponge and the drain was secured to the skin with a 3-0 nylon.      Total blood loss was 2175 mL and 1030 mL was returned via Cell Saver.  The patient received an additional 1 unit of packed red cells transfusion.      Intraoperative thromboelastogram was  normal and coagulation parameters remained within normal limits, the patient had higher than usual amount of bleeding from bone and scar tissue despite utilization of tranexamic acid.      There were no intraoperative complications.        I was present for the entire procedure and performed key portions of the procedure from skin incision through the final stages of skin closure for which I was immediately available.  A 2 surgeon approach was used because of the complex deformity correction needed and was utilized to minimize operative time and blood loss in this difficult case.  The patient was taken to the PACU in stable condition.         JUAN KEITH MD             D: 2018   T: 2018   MT: ANALY      Name:     MUKUND MENDOZA   MRN:      [id number]        Account:        [id number]   :      1950           Procedure Date: 2018      Document: [id number]

## 2018-05-15 NOTE — PLAN OF CARE
Problem: Pain, Acute (Adult)  Goal: Identify Related Risk Factors and Signs and Symptoms  Related risk factors and signs and symptoms are identified upon initiation of Human [employer] Clinical Practice Guideline (CPG).   Outcome: No Change  Alert and oriented x 4. Complained of pain in the back. Oxycodone 5 mg plus scheduled tylenol given. Denies numbness and tingling during the shift and LLE 4/5. Pt prefers ice packs and aqua K with ice. Capnography on, IP 10 and ECO2 35-38. Platelets given and transfusion tolerated. Magnesium 4 mg given. Ace with fair output and hemovac put out 155 ml. Back dressing clean, dry and intact.

## 2018-05-15 NOTE — PROGRESS NOTES
"CLINICAL NUTRITION SERVICES - ASSESSMENT NOTE     Nutrition Prescription    RECOMMENDATIONS FOR MDs/PROVIDERS TO ORDER:  Would recommend obtaining a SLP consult for swallow evaluation if pt continues to require multiple swallows with individual foods.    Malnutrition Status:    Non-severe malnutrition in the context of acute illness      Recommendations already ordered by Registered Dietitian (RD):  Order calorie counts starting 5/16 x 3 days to assess po adequacy (especially of protein intakes).  Order Vanilla Boost Plus for HS snack  Order Room Service with Assist to help pt with achieving protein goal per day.    Future/Additional Recommendations:  Need for enteral feedings if po intakes inadequate     REASON FOR ASSESSMENT  Marcia Kauffman is a/an 67 year old female assessed by the dietitian for Provider Order - Requesting Nutrition Evaluation; Patient needs to be placed on dietary regimen with 1.5G/KG Protein x3 months.    NUTRITION HISTORY  Pt reports that she had a good appetite PTA and was trying to eat well in anticipation of her up coming surgery.    CURRENT NUTRITION ORDERS  Diet: Regular  Intake/Tolerance: Fair, states appetite decreased post op and she has also had some trouble swallowing with multiple swallows required with certain foods.   - Reports consuming 3/4 of her scrambled eggs, 1/4 of her mixed fruit and part of muffin for breakfast and 1/2 chicken salad sandwich and chicken noodle soup for lunch. Commented that her  ate the other half of her chicken salad sandwich.    LABS  Labs reviewed    MEDICATIONS  Vitamin C 1000 mg daily   Thera-Vit-M daily    ANTHROPOMETRICS  Height: 163.8 cm (5' 4.5\")  Most Recent Weight: 57 kg (125 lb 10.6 oz) - admit wt (5/14)  IBW: 55.7 kg  BMI: Normal BMI  Weight History: Pt reports her wt has been stable betw 125 to 128 lbs.  Wt Readings from Last 10 Encounters:   05/14/18 57 kg (125 lb 10.6 oz)   05/07/18 57.5 kg (126 lb 12.8 oz)   02/01/18 58.3 kg " (128 lb 9.6 oz)   12/21/17 58.4 kg (128 lb 12.8 oz)   12/21/17 58.4 kg (128 lb 12.8 oz)   11/15/16 55.5 kg (122 lb 6.4 oz)   08/29/16 57.2 kg (126 lb)   05/18/16 56.1 kg (123 lb 9.6 oz)   02/01/16 57.6 kg (127 lb)   10/20/15 61.4 kg (135 lb 5.8 oz)       Dosing Weight: 57 kg    ASSESSED NUTRITION NEEDS  Estimated Energy Needs: 4476-1249 kcals/day (25 - 30 kcals/kg)  Justification: Maintenance  Estimated Protein Needs: 85 grams protein/day (1.5 g pro/kg)  Justification: Increased needs  Estimated Fluid Needs: (1 mL/kcal)   Justification: Maintenance    PHYSICAL FINDINGS   See malnutrition section below.  No abnormal nutrition-related physical findings observed.     MALNUTRITION(Limited d/t nursing care at the bedside)    % Intake: No decreased intake noted  % Weight Loss: None noted  Subcutaneous Fat Loss: Facial region and Upper arm:Mild  Muscle Loss: Temporal and Upper arm (bicep, tricep): Mild  Fluid Accumulation/Edema: Does not meet criteria (per chart review)  Malnutrition Diagnosis: Non-severe malnutrition in the context of acute illness    NUTRITION DIAGNOSIS  Increased nutrient needs (protein) related to postop healing and recovery as evidenced by MD consult for pt to consume 1.5 g PRO/kg per day.      INTERVENTIONS  Implementation  Nutrition Education: Provided education on need to consume 1.5 g Protein/kg (total of 85 g per day) x next 3 mos. Provided pt with a list of the protein content of the foods on Room Service menu. Also discussed utilizing nutritional supplements to help meet goal of 1.5 g protein per day.  Medical food supplement therapy     Goals  Ave po intakes per calorie counts x 3 days to meet at least 25 kcals/kg and 1.5 g PRo/kg.      Monitoring/Evaluation  Progress toward goals will be monitored and evaluated per protocol.  Nerissa Nelson RD,LD  Unit Pager [phone]

## 2018-05-15 NOTE — OP NOTE
Procedure Date: 05/14/2018      DATE OF PROCEDURE:  05/14/2018      PRE-PROCEDURE DIAGNOSES:   1.  Lumbar flat back syndrome.     2.  History of prior surgery, degenerative spondylolisthesis.      POSTOPERATIVE DIAGNOSES:   1.  Lumbar flat back syndrome.   2.  History of prior surgery, degenerative spondylolisthesis.      PROCEDURES:   1.  Posterior spinal fusion L2-S1.   2.  Segmental spinal instrumentation L2-S1.   3.  Pelvic fixation.   4.  Reinsertion spinal instrumentation, L4-L5.   5.  Oshea-Limon osteotomies L2-L3, L3-L4, L4-5.   6.  Transforaminal lumbar interbody fusions L2-3, L3-4, L4-5.   7.  Insertion of structural intervertebral device L2-3, L3-4, L4-5.      SURGEON:  Nanette Beckford, neurosurgery.        CO-SURGEON:  Hector Aldana MD.  The reason for the cosurgeon was the magnitude of the surgery and the complexity of the surgery.      ASSISTANT:  Pako Gamino MD; Btezy Rowe PA-C, who was necessary for bone graft preparation, assistance with exposure, positioning and instrumentation.      INDICATION FOR OPERATION:  The patient is a 67-year-old female with a history of significant lumbar flat back deformity with a pelvic incidence of lumbar lordosis greater than 30 degrees.  Previously, the patient had a degenerative spondylolisthesis fused at L4-5 and was left in significant kyphotic deformity across the segment.  She has bone-on-bone collapse at L5-S1 and significant hyperextension at the levels above.  She was initially found to have significant osteoporosis and was started on Forteo medication in 07/2017, and has been undergoing that for awhile.  She has had significant pain and limitations and it was her desire to proceed with surgical treatment.  Risks, benefits, alternative treatments and expected outcomes were extensively discussed.      DESCRIPTION OF TECHNICAL PROCEDURES USED:  The OR team was briefed about the plan.  The patient was brought to the operating room and underwent the  induction of adequate general anesthesia.  A Ace catheter line was placed and was turned in position prone on the ProAxis table.  She was then prepared and draped in the usual sterile fashion.  A timeout was done confirming site and type of surgery.      She did receive prophylactic antibiotics and tranexamic acid.      The previous incision was opened up and the spine progressively exposed.  The instrumented levels confirmed the appropriate levels of dissection.  The set plugs and rods were removed and the O-arm was brought in.  The screw-based reference frame was attached and the O-arm obtained intraoperative 3D images and transferred to the Stealth image-guided workstation, and these were now used to place pedicle screws.  The pedicle screws were multiaxial screws from the Medtronic Solera 5.5/6.0 system.  These were titanium screws with cobalt chrome heads.  All screws were placed with navigation.  This was done by identifying a start point with a navigated awl, utilizing a bur to create a cortical defect and the navigator probe, then a navigated tap and navigated screw placement.  At S1, we placed 7.5 x 45 mm screws.  At the S2, AI screws, we placed 9.5 x 100 mm screws.  These were done by finding the start point, utilizing a drill, spanning segment, doing a navigated tap to the nominal diameter of the screws, and then placing the screws.  Check spin confirmed optimal screw position.      The spinous process tracking arc was now attached to the spinous process of L2 and screws were then placed at L2 and L3.  Of note is the screws at L4 and L5 had to be changed out as well as these were screws for a 4.0 mm robert system.  These were 6.5 x 45 mm screws that were replaced with 7.5 x 45 mm screws.      Attention was now addressed to the prior L4-L5 fusion level.  There was significant dural scarring and revision laminectomy approach was performed, taking down the scar and freeing up the dura from the residual bone.   An osteotome was then used to cut through the bone at the L4-5 level, saving the bone for subsequent fusion.  This was done bilaterally.  Significant epidural bleeding was encountered and controlled with bipolar cautery and Gelfoam and cottonoids.  We were able to identify the disk space.  This was incised.  Screw-based distraction was applied to allow better access into the disk space.  The disk was progressively cleaned out.  Insert and rotate distractors were used and we sized this up for 2 Medtronic PEEK Capstone control interbody cages that were 13 mm high, 22 mm long, 18-degree lordotic.  A single BMP sponge was used in each cage, seated in place, additional bone placed centrally and laterally.  This was confirmed under fluoroscopy and showed excellent correction and allowed us to think that we were going to be able to do the surgery without the original planned pedicle subtraction osteotomy.  Of note is that the insertional torque from the screws was all pretty good, so we chose not to use bone cement augmentation.      Next, attention was addressed to the L2-L3 level where again a Smith-Limon osteotomy was performed.  Of note is taking down the fusion mass constituted a Oshea-Limon osteotomy at L4-5.  At L2-3 the inferior articular facets of L2 were resected and superior articular facets of L3.  The disk space was entered bilaterally, disk material progressively resected, insert and rotate distractors used to elevate up the disk space.  We made the decision not to insert the interbody implants at this time as we wanted more mobility for the L3-L4 disk space.  Of note is the spinous process of L4 was gone from the previous surgery.  So again, we had to use screw-based distraction to open this up and again we completed the Smith-Limon osteotomy, taking the inferior articular facets of L3, the superior articular facets of L4, finding the disk space, incising it, progressively resecting the disk  material utilizing insert and rotate distractors and elevating this up.  Again, we placed 2 Medtronic PEEK Capstone control 13 x 22 x 18 degree lordotic cages, each with a single BMP sponge in place and then additional bone graft, and then we returned to the L2-L3 level.  Here we inserted two 14 mm x 22 mm x 18 degree lordotic cages and seated them into place.  The table which had been flexed to 15 degrees of flexion was now taken to -5 degrees of extension and we saw good closure of this.  We had some challenge with overlap of the screws at the L4-L5 level due to the unusual angulation that they had initially been put in at and had to do some work in order to distract across here to get those screw heads lined up, and then we cut and contoured a robert initially seating into place on the left side, getting some additional correction and then similarly a robert on the right side.  These were cobalt chrome rods.  The wound was then filled with irrigation and intraoperative 2D images obtained.  This showed that we went from about 22 degrees of lordosis to approximately 53 degrees of lordosis, which was within about 9 degrees of the pelvic incidence which I had measured at 62 degrees.  So this put us in the target range that we desired to achieve without having to do the pedicle subtraction osteotomy.  Therefore, we decided to accept this.      The set plugs were then broken off in accordance with the manufacture specifications.  Posterior and posterolateral fusion done with allograft bone, vancomycin powder placed in the wound.  The wound closed in layers, an 1/8-inch Hemovac drain placed superficial to this and then the skin closed with absorbable suture and a dressing applied.  Estimated blood loss for this case was 1800 mL.  The patient received 700 mL back via Cell Saver return and 1 unit of packed red blood cells intraoperatively.      Neuro monitoring was stable throughout.         ROBYN BUSCH JR, MD              D: 2018   T: 05/15/2018   MT: CC      Name:     MUKUND MENDOZA   MRN:      [id number]        Account:        [id number]   :      1950           Procedure Date: 2018      Document: [id number]       cc: Copy for Patient        Nanette Beckford MD, MD Primary

## 2018-05-16 ENCOUNTER — APPOINTMENT (OUTPATIENT)
Dept: PHYSICAL THERAPY | Facility: CLINIC | Age: 68
DRG: 454 | End: 2018-05-16
Attending: NEUROLOGICAL SURGERY
Payer: COMMERCIAL

## 2018-05-16 ENCOUNTER — APPOINTMENT (OUTPATIENT)
Dept: OCCUPATIONAL THERAPY | Facility: CLINIC | Age: 68
DRG: 454 | End: 2018-05-16
Attending: NURSE PRACTITIONER
Payer: COMMERCIAL

## 2018-05-16 LAB
ANION GAP SERPL CALCULATED.3IONS-SCNC: 6 MMOL/L (ref 3–14)
APTT PPP: 34 SEC (ref 22–37)
BASOPHILS # BLD AUTO: 0 10E9/L (ref 0–0.2)
BASOPHILS NFR BLD AUTO: 0.2 %
BUN SERPL-MCNC: 12 MG/DL (ref 7–30)
CA-I SERPL ISE-MCNC: 4.6 MG/DL (ref 4.4–5.2)
CALCIUM SERPL-MCNC: 8.5 MG/DL (ref 8.5–10.1)
CHLORIDE SERPL-SCNC: 105 MMOL/L (ref 94–109)
CO2 SERPL-SCNC: 28 MMOL/L (ref 20–32)
CREAT SERPL-MCNC: 0.65 MG/DL (ref 0.52–1.04)
DIFFERENTIAL METHOD BLD: ABNORMAL
EOSINOPHIL # BLD AUTO: 0 10E9/L (ref 0–0.7)
EOSINOPHIL NFR BLD AUTO: 0.2 %
ERYTHROCYTE [DISTWIDTH] IN BLOOD BY AUTOMATED COUNT: 13.8 % (ref 10–15)
GFR SERPL CREATININE-BSD FRML MDRD: >90 ML/MIN/1.7M2
GLUCOSE BLDC GLUCOMTR-MCNC: 103 MG/DL (ref 70–99)
GLUCOSE SERPL-MCNC: 108 MG/DL (ref 70–99)
HCT VFR BLD AUTO: 29.5 % (ref 35–47)
HGB BLD-MCNC: 10 G/DL (ref 11.7–15.7)
IMM GRANULOCYTES # BLD: 0 10E9/L (ref 0–0.4)
IMM GRANULOCYTES NFR BLD: 0.2 %
INR PPP: 1.22 (ref 0.86–1.14)
LACTATE BLD-SCNC: 1.4 MMOL/L (ref 0.4–1.9)
LYMPHOCYTES # BLD AUTO: 1.5 10E9/L (ref 0.8–5.3)
LYMPHOCYTES NFR BLD AUTO: 11.8 %
MAGNESIUM SERPL-MCNC: 1.8 MG/DL (ref 1.6–2.3)
MCH RBC QN AUTO: 32.6 PG (ref 26.5–33)
MCHC RBC AUTO-ENTMCNC: 33.9 G/DL (ref 31.5–36.5)
MCV RBC AUTO: 96 FL (ref 78–100)
MONOCYTES # BLD AUTO: 1.3 10E9/L (ref 0–1.3)
MONOCYTES NFR BLD AUTO: 9.9 %
NEUTROPHILS # BLD AUTO: 9.9 10E9/L (ref 1.6–8.3)
NEUTROPHILS NFR BLD AUTO: 77.7 %
NRBC # BLD AUTO: 0 10*3/UL
NRBC BLD AUTO-RTO: 0 /100
PHOSPHATE SERPL-MCNC: 1.8 MG/DL (ref 2.5–4.5)
PLATELET # BLD AUTO: 128 10E9/L (ref 150–450)
POTASSIUM SERPL-SCNC: 3.7 MMOL/L (ref 3.4–5.3)
RBC # BLD AUTO: 3.07 10E12/L (ref 3.8–5.2)
SODIUM SERPL-SCNC: 139 MMOL/L (ref 133–144)
WBC # BLD AUTO: 12.8 10E9/L (ref 4–11)

## 2018-05-16 PROCEDURE — 97116 GAIT TRAINING THERAPY: CPT | Mod: GP | Performed by: PHYSICAL THERAPIST

## 2018-05-16 PROCEDURE — 25000132 ZZH RX MED GY IP 250 OP 250 PS 637: Performed by: STUDENT IN AN ORGANIZED HEALTH CARE EDUCATION/TRAINING PROGRAM

## 2018-05-16 PROCEDURE — 83605 ASSAY OF LACTIC ACID: CPT | Performed by: NEUROLOGICAL SURGERY

## 2018-05-16 PROCEDURE — 80048 BASIC METABOLIC PNL TOTAL CA: CPT | Performed by: STUDENT IN AN ORGANIZED HEALTH CARE EDUCATION/TRAINING PROGRAM

## 2018-05-16 PROCEDURE — 25000132 ZZH RX MED GY IP 250 OP 250 PS 637: Performed by: NURSE PRACTITIONER

## 2018-05-16 PROCEDURE — 97165 OT EVAL LOW COMPLEX 30 MIN: CPT | Mod: GO

## 2018-05-16 PROCEDURE — 97535 SELF CARE MNGMENT TRAINING: CPT | Mod: GO

## 2018-05-16 PROCEDURE — 97530 THERAPEUTIC ACTIVITIES: CPT | Mod: GP | Performed by: PHYSICAL THERAPIST

## 2018-05-16 PROCEDURE — 40000193 ZZH STATISTIC PT WARD VISIT: Performed by: PHYSICAL THERAPIST

## 2018-05-16 PROCEDURE — 40000133 ZZH STATISTIC OT WARD VISIT

## 2018-05-16 PROCEDURE — 85610 PROTHROMBIN TIME: CPT | Performed by: STUDENT IN AN ORGANIZED HEALTH CARE EDUCATION/TRAINING PROGRAM

## 2018-05-16 PROCEDURE — 36592 COLLECT BLOOD FROM PICC: CPT | Performed by: STUDENT IN AN ORGANIZED HEALTH CARE EDUCATION/TRAINING PROGRAM

## 2018-05-16 PROCEDURE — 36415 COLL VENOUS BLD VENIPUNCTURE: CPT | Performed by: NEUROLOGICAL SURGERY

## 2018-05-16 PROCEDURE — L0472 TLSO RIGID FRAME HYPEREX PRE: HCPCS

## 2018-05-16 PROCEDURE — 83735 ASSAY OF MAGNESIUM: CPT | Performed by: STUDENT IN AN ORGANIZED HEALTH CARE EDUCATION/TRAINING PROGRAM

## 2018-05-16 PROCEDURE — 12000008 ZZH R&B INTERMEDIATE UMMC

## 2018-05-16 PROCEDURE — 85025 COMPLETE CBC W/AUTO DIFF WBC: CPT | Performed by: STUDENT IN AN ORGANIZED HEALTH CARE EDUCATION/TRAINING PROGRAM

## 2018-05-16 PROCEDURE — 82330 ASSAY OF CALCIUM: CPT | Performed by: STUDENT IN AN ORGANIZED HEALTH CARE EDUCATION/TRAINING PROGRAM

## 2018-05-16 PROCEDURE — 25000125 ZZHC RX 250: Performed by: NURSE PRACTITIONER

## 2018-05-16 PROCEDURE — 00000146 ZZHCL STATISTIC GLUCOSE BY METER IP

## 2018-05-16 PROCEDURE — 85730 THROMBOPLASTIN TIME PARTIAL: CPT | Performed by: STUDENT IN AN ORGANIZED HEALTH CARE EDUCATION/TRAINING PROGRAM

## 2018-05-16 PROCEDURE — 25000125 ZZHC RX 250: Performed by: STUDENT IN AN ORGANIZED HEALTH CARE EDUCATION/TRAINING PROGRAM

## 2018-05-16 PROCEDURE — 25000128 H RX IP 250 OP 636: Performed by: STUDENT IN AN ORGANIZED HEALTH CARE EDUCATION/TRAINING PROGRAM

## 2018-05-16 PROCEDURE — 84100 ASSAY OF PHOSPHORUS: CPT | Performed by: STUDENT IN AN ORGANIZED HEALTH CARE EDUCATION/TRAINING PROGRAM

## 2018-05-16 RX ORDER — BISACODYL 10 MG
10 SUPPOSITORY, RECTAL RECTAL ONCE
Status: DISCONTINUED | OUTPATIENT
Start: 2018-05-16 | End: 2018-05-16

## 2018-05-16 RX ORDER — BISACODYL 10 MG
10 SUPPOSITORY, RECTAL RECTAL ONCE
Status: COMPLETED | OUTPATIENT
Start: 2018-05-16 | End: 2018-05-16

## 2018-05-16 RX ADMIN — POLYETHYLENE GLYCOL 3350 17 G: 17 POWDER, FOR SOLUTION ORAL at 20:44

## 2018-05-16 RX ADMIN — METHOCARBAMOL 500 MG: 500 TABLET ORAL at 04:52

## 2018-05-16 RX ADMIN — VALACYCLOVIR HYDROCHLORIDE 500 MG: 500 TABLET, FILM COATED ORAL at 07:52

## 2018-05-16 RX ADMIN — ACETAMINOPHEN 975 MG: 325 TABLET, FILM COATED ORAL at 19:22

## 2018-05-16 RX ADMIN — OXYCODONE HYDROCHLORIDE AND ACETAMINOPHEN 1000 MG: 500 TABLET ORAL at 07:52

## 2018-05-16 RX ADMIN — POTASSIUM PHOSPHATE, MONOBASIC AND POTASSIUM PHOSPHATE, DIBASIC 20 MMOL: 224; 236 INJECTION, SOLUTION INTRAVENOUS at 22:29

## 2018-05-16 RX ADMIN — OXYCODONE HYDROCHLORIDE 5 MG: 5 TABLET ORAL at 22:14

## 2018-05-16 RX ADMIN — MAGESIUM CITRATE 296 ML: 1.75 LIQUID ORAL at 20:45

## 2018-05-16 RX ADMIN — Medication 2 G: at 20:47

## 2018-05-16 RX ADMIN — POTASSIUM CHLORIDE 20 MEQ: 750 TABLET, EXTENDED RELEASE ORAL at 20:43

## 2018-05-16 RX ADMIN — SENNOSIDES AND DOCUSATE SODIUM 2 TABLET: 8.6; 5 TABLET ORAL at 07:52

## 2018-05-16 RX ADMIN — ACETAMINOPHEN 975 MG: 325 TABLET, FILM COATED ORAL at 10:17

## 2018-05-16 RX ADMIN — POLYETHYLENE GLYCOL 3350 17 G: 17 POWDER, FOR SOLUTION ORAL at 15:50

## 2018-05-16 RX ADMIN — MULTIPLE VITAMINS W/ MINERALS TAB 1 TABLET: TAB at 07:52

## 2018-05-16 RX ADMIN — BISACODYL 10 MG: 10 SUPPOSITORY RECTAL at 19:23

## 2018-05-16 RX ADMIN — POLYETHYLENE GLYCOL 3350 17 G: 17 POWDER, FOR SOLUTION ORAL at 07:52

## 2018-05-16 RX ADMIN — MENTHOL 1 PATCH: 205.5 PATCH TOPICAL at 20:56

## 2018-05-16 RX ADMIN — OXYCODONE HYDROCHLORIDE 5 MG: 5 TABLET ORAL at 09:01

## 2018-05-16 RX ADMIN — SENNOSIDES AND DOCUSATE SODIUM 2 TABLET: 8.6; 5 TABLET ORAL at 20:43

## 2018-05-16 RX ADMIN — METHOCARBAMOL 500 MG: 500 TABLET ORAL at 17:08

## 2018-05-16 RX ADMIN — ONDANSETRON 4 MG: 4 TABLET, ORALLY DISINTEGRATING ORAL at 10:21

## 2018-05-16 RX ADMIN — ACETAMINOPHEN 975 MG: 325 TABLET, FILM COATED ORAL at 01:30

## 2018-05-16 ASSESSMENT — VISUAL ACUITY
OU: NORMAL ACUITY

## 2018-05-16 ASSESSMENT — ACTIVITIES OF DAILY LIVING (ADL): PREVIOUS_RESPONSIBILITIES: MEAL PREP;HOUSEKEEPING;LAUNDRY;SHOPPING;MEDICATION MANAGEMENT;DRIVING

## 2018-05-16 NOTE — PLAN OF CARE
"Problem: Patient Care Overview  Goal: Plan of Care/Patient Progress Review  Outcome: Improving  /41  Pulse 88  Temp 97.2  F (36.2  C) (Oral)  Resp 16  Ht 1.638 m (5' 4.5\")  Wt 57 kg (125 lb 10.6 oz)  SpO2 99%  BMI 21.24 kg/m2  9781-9610:  POD1 s/p L2-L4 laminectomy for lumbar stenosis. Alert & oriented x4. Neuros intact with exception of left leg knee/thigh numbness and 4/5 strength. Other 3 extremities 5/5 strength. Pt c/o left leg stiffness when ambulating. Mid back Aquacel dressing intact. R mid back Hemovac intact; drainage bag changed. Site with CDI gauze dressing. Back pain managed with oxycodone x1 at HS, Dilaudid IV x1 and Robaxin x1. Intermittently using cold packs to back. Ace removed at 1100 dayshift; pt has no difficulty voiding spontaneously post removal of catheter. New R PIV placed and infused NS at 100 ml/hr until around 2000 when IV fluids were discontinued. Pt now ambulating to  SBA; GB. Requested walker. Sat up in chair a couple hours this evening around meal time. Good appetite; calorie counts to start at midnight. Dietician also ordered supplemental drinks for pt. Continue to monitor and follow POC.        "

## 2018-05-16 NOTE — PROGRESS NOTES
S: Pt seen at [facility] bedside with good spirits. She seemed to think the [place] TLSO will work for her. O: I see the EPIC order for the Adalberto TLSO due to Spinal issues. She measured size small. A: The [place] was fit and she was instructed on use and care and seemed to understand. The manufacture's instruction sheet was also given. P: FU PRN. G: The goal is to exert 3 point pressure system to reduce spinal flexion.

## 2018-05-16 NOTE — PLAN OF CARE
Problem: Patient Care Overview  Goal: Plan of Care/Patient Progress Review  Discharge Planner OT   Patient plan for discharge:  Rehab stay vs home  Current status:  Faciliated bed mobility from supine <> EOB with SBA and VC. Pt to chair with SBA and GB. Pt sat in chair to thorough discussion of ADLs within precautions and POC. Pt educated on LE dressing within precautions and returned demo with success. Pt able to describe home set/up and modifications to be made for safe DC home. Pt states MONIQUE daughter will be in town to assist next 5 days. Pt feels nervous about returning home, however with education and level of assistance from daughter and  pt demo'd increased agreeability. Patient verbalizes understanding of all education, meeting all inpatient OT goals.  Barriers to return to prior living situation: None, stair practice with PT   Recommendations for discharge: Home with Assist   Rationale for recommendations: pt knowledgeable of ADLs within precautions, has accessible home and good support system at home.        Entered by: Suzie Good 05/16/2018 5:00 PM       Occupational Therapy Discharge Summary    Reason for therapy discharge:    All goals and outcomes met, no further needs identified.    Progress towards therapy goal(s). See goals on Care Plan in [facility] electronic health record for goal details.  Goals met    Therapy recommendation(s):    No further therapy is recommended.

## 2018-05-16 NOTE — PROGRESS NOTES
"S: Reports Left Anterior Thigh Numbness. Pain Well-Controlled on Current Regimen. Ace removed 5/15.     O:  Exam:  General: Awake;  Alert, In No Acute Distress  Pulm: Breathing Comfortably on RA  Mental status: Oriented x 3  Cranial Nerves: Cranial Nerves II-XII Grossly Intact Bilaterally  Strength:      Del Tr Bi WE WF Gr  R 5 5 5 5 5 5  L 5 5 5 5 5 5     HF KE KF DF PF EHL  R 5 5 5 4* 5 5  L 5 5 5 5 5 5  *chronic    Pronator Drift: Absent  Sensory: Decreased over left thigh  Reflexes: No Hyperreflexia, Tena s or Clonus Present; Toes Down-Going Bilaterally  INCISION: c/d/i, covered with aquacell    A: Doing well post-operatively, working with therapies.    P:  Operation: Status post L2-S1 Posterior Instrumented Fusion and Correction of Flat Back Syndrome   Sutures out: 5/28/2018  Dressing change: 5/19  Drains: Suprafascial Lumbar Hemovac; Please empty and record output every 8 hours  Imaging: Upright AP/Lateral Lumbosacral X-Rays completed  Pain: Controlled; On Dilaudid 0.3-0.5 mg Q 2 HR PRN IVP; Oxycodone 5-10 mg Q 4 HR PRN; Robaxin 500 mg QID PRN  Blood pressure goals: SBP < 160 mmHg  Diet: Nutrition Consult; Regular Diet with 1.5G/KG protein/day for 3 months  GI: will need suppository if not having bowel movements 5/16   Hematological goals: Platelets > 100k, INR < 1.5, Hemoglobin > 8; CBC, PT, INR Q 6 HR  Endo: Osteoporosis; Continue home Forteo Injections 600 mcg daily  PT/OT: Consults Placed; OT deferred, PT recommends TCU 5/15  DVT prophylaxis: Sequential compression devices to bilateral lower extremities  Ulcer prophylaxis: none indicated  DISPO:  Anticipate D/C Home 5/18/2018  Barriers: Surgical drain, BM/flatus, pain controlled on PO medications    Howard \"David\" MD [place]   Neurosurgery, PGY-1    Please contact neurosurgery resident on call with questions.    Dial * [phone], enter [phone] when prompted.     "

## 2018-05-16 NOTE — PROGRESS NOTES
SPIRITUAL HEALTH SERVICES  SPIRITUAL ASSESSMENT Progress Note  [facility]A     REFERRAL SOURCE: Epic request    Visited with pt, who was talkative and spoke about her life with her  and their memo community.  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  Requested prayer, which  I provided.    PLAN: Visit 1x/week during this hospitalization.    Dominga Beal  Chaplain Resident  Pager [phone]

## 2018-05-16 NOTE — PLAN OF CARE
Problem: Patient Care Overview  Goal: Plan of Care/Patient Progress Review  Outcome: No Change  POD2 s/p L2-L4 laminectomy for lumbar stenosis, back dressing CDI. Has R. mid back hemovac w/ gauze dressing CDI. VSS. A&Ox4. Neuros include: L. thigh N/T and 4/5 strength, all other extremities 5/5. Tolerating regular diet with calorie counts. Voids spontaneously without difficulty. No BM this shift but is passing gas. Up w/ GB/walker. PIV, SL. Had c/o back pain overnight managed with Robaxinx1 and cold packs overnight. Continue to monitor and follow POC.

## 2018-05-16 NOTE — PLAN OF CARE
Problem: Patient Care Overview  Goal: Plan of Care/Patient Progress Review  Outcome: No Change  POD #2 L2 through sacrum posterior lateral instrumented fusion. VSS. Pain adequately controlled with tylenol, robaxin x1, and oxycodone x1. Neuros unchanged; L thigh and knee numbness. LLE 4/5. Pt received Adalberto brace today, needs to be on when HOB >30. Voiding spontaneously. No BM, plan for a suppository after  leaves. Hemovac in place, minimal output. PIV SL. Tolerating regular diet, calorie counts. Ambulated in halls x2. Continue to monitor. Possible discharge tomorrow.

## 2018-05-17 ENCOUNTER — APPOINTMENT (OUTPATIENT)
Dept: PHYSICAL THERAPY | Facility: CLINIC | Age: 68
DRG: 454 | End: 2018-05-17
Attending: NEUROLOGICAL SURGERY
Payer: COMMERCIAL

## 2018-05-17 VITALS
TEMPERATURE: 98.8 F | RESPIRATION RATE: 16 BRPM | HEART RATE: 81 BPM | SYSTOLIC BLOOD PRESSURE: 108 MMHG | BODY MASS INDEX: 20.94 KG/M2 | WEIGHT: 125.66 LBS | DIASTOLIC BLOOD PRESSURE: 55 MMHG | OXYGEN SATURATION: 97 % | HEIGHT: 65 IN

## 2018-05-17 LAB
ANION GAP SERPL CALCULATED.3IONS-SCNC: 5 MMOL/L (ref 3–14)
APTT PPP: 35 SEC (ref 22–37)
BASOPHILS # BLD AUTO: 0 10E9/L (ref 0–0.2)
BASOPHILS NFR BLD AUTO: 0.1 %
BUN SERPL-MCNC: 12 MG/DL (ref 7–30)
CALCIUM SERPL-MCNC: 8.7 MG/DL (ref 8.5–10.1)
CHLORIDE SERPL-SCNC: 102 MMOL/L (ref 94–109)
CO2 SERPL-SCNC: 31 MMOL/L (ref 20–32)
CREAT SERPL-MCNC: 0.66 MG/DL (ref 0.52–1.04)
DIFFERENTIAL METHOD BLD: ABNORMAL
EOSINOPHIL # BLD AUTO: 0 10E9/L (ref 0–0.7)
EOSINOPHIL NFR BLD AUTO: 0.1 %
ERYTHROCYTE [DISTWIDTH] IN BLOOD BY AUTOMATED COUNT: 14.1 % (ref 10–15)
GFR SERPL CREATININE-BSD FRML MDRD: 89 ML/MIN/1.7M2
GLUCOSE SERPL-MCNC: 143 MG/DL (ref 70–99)
HCT VFR BLD AUTO: 29.8 % (ref 35–47)
HGB BLD-MCNC: 9.9 G/DL (ref 11.7–15.7)
IMM GRANULOCYTES # BLD: 0 10E9/L (ref 0–0.4)
IMM GRANULOCYTES NFR BLD: 0.3 %
INR PPP: 1.02 (ref 0.86–1.14)
LYMPHOCYTES # BLD AUTO: 1.5 10E9/L (ref 0.8–5.3)
LYMPHOCYTES NFR BLD AUTO: 11.3 %
MAGNESIUM SERPL-MCNC: 2 MG/DL (ref 1.6–2.3)
MCH RBC QN AUTO: 32.4 PG (ref 26.5–33)
MCHC RBC AUTO-ENTMCNC: 33.2 G/DL (ref 31.5–36.5)
MCV RBC AUTO: 97 FL (ref 78–100)
MONOCYTES # BLD AUTO: 0.8 10E9/L (ref 0–1.3)
MONOCYTES NFR BLD AUTO: 5.9 %
NEUTROPHILS # BLD AUTO: 11.1 10E9/L (ref 1.6–8.3)
NEUTROPHILS NFR BLD AUTO: 82.3 %
NRBC # BLD AUTO: 0 10*3/UL
NRBC BLD AUTO-RTO: 0 /100
PLATELET # BLD AUTO: 153 10E9/L (ref 150–450)
POTASSIUM SERPL-SCNC: 4 MMOL/L (ref 3.4–5.3)
RBC # BLD AUTO: 3.06 10E12/L (ref 3.8–5.2)
SODIUM SERPL-SCNC: 139 MMOL/L (ref 133–144)
WBC # BLD AUTO: 13.5 10E9/L (ref 4–11)

## 2018-05-17 PROCEDURE — 97116 GAIT TRAINING THERAPY: CPT | Mod: GP | Performed by: REHABILITATION PRACTITIONER

## 2018-05-17 PROCEDURE — 36415 COLL VENOUS BLD VENIPUNCTURE: CPT | Performed by: STUDENT IN AN ORGANIZED HEALTH CARE EDUCATION/TRAINING PROGRAM

## 2018-05-17 PROCEDURE — 25000132 ZZH RX MED GY IP 250 OP 250 PS 637: Performed by: STUDENT IN AN ORGANIZED HEALTH CARE EDUCATION/TRAINING PROGRAM

## 2018-05-17 PROCEDURE — 80048 BASIC METABOLIC PNL TOTAL CA: CPT | Performed by: STUDENT IN AN ORGANIZED HEALTH CARE EDUCATION/TRAINING PROGRAM

## 2018-05-17 PROCEDURE — 40000193 ZZH STATISTIC PT WARD VISIT: Performed by: REHABILITATION PRACTITIONER

## 2018-05-17 PROCEDURE — 83735 ASSAY OF MAGNESIUM: CPT | Performed by: STUDENT IN AN ORGANIZED HEALTH CARE EDUCATION/TRAINING PROGRAM

## 2018-05-17 PROCEDURE — 97530 THERAPEUTIC ACTIVITIES: CPT | Mod: GP | Performed by: REHABILITATION PRACTITIONER

## 2018-05-17 PROCEDURE — 85610 PROTHROMBIN TIME: CPT | Performed by: STUDENT IN AN ORGANIZED HEALTH CARE EDUCATION/TRAINING PROGRAM

## 2018-05-17 PROCEDURE — 85025 COMPLETE CBC W/AUTO DIFF WBC: CPT | Performed by: STUDENT IN AN ORGANIZED HEALTH CARE EDUCATION/TRAINING PROGRAM

## 2018-05-17 PROCEDURE — 85730 THROMBOPLASTIN TIME PARTIAL: CPT | Performed by: STUDENT IN AN ORGANIZED HEALTH CARE EDUCATION/TRAINING PROGRAM

## 2018-05-17 PROCEDURE — 25000132 ZZH RX MED GY IP 250 OP 250 PS 637: Performed by: NURSE PRACTITIONER

## 2018-05-17 PROCEDURE — 25000125 ZZHC RX 250: Performed by: NURSE PRACTITIONER

## 2018-05-17 RX ORDER — METHOCARBAMOL 500 MG/1
500 TABLET, FILM COATED ORAL 4 TIMES DAILY PRN
Qty: 45 TABLET | Refills: 0 | Status: SHIPPED | OUTPATIENT
Start: 2018-05-17 | End: 2018-05-29

## 2018-05-17 RX ORDER — POLYETHYLENE GLYCOL 3350 17 G/17G
17 POWDER, FOR SOLUTION ORAL 3 TIMES DAILY
Qty: 7 PACKET | Refills: 1 | Status: SHIPPED | OUTPATIENT
Start: 2018-05-17 | End: 2018-08-20

## 2018-05-17 RX ORDER — AMOXICILLIN 250 MG
2 CAPSULE ORAL 2 TIMES DAILY
Qty: 30 TABLET | Refills: 0 | Status: SHIPPED | OUTPATIENT
Start: 2018-05-17 | End: 2018-08-20

## 2018-05-17 RX ORDER — OXYCODONE HYDROCHLORIDE 5 MG/1
5-10 TABLET ORAL EVERY 4 HOURS PRN
Qty: 60 TABLET | Refills: 0 | Status: SHIPPED | OUTPATIENT
Start: 2018-05-17 | End: 2018-05-29

## 2018-05-17 RX ORDER — FOLIC ACID/MULTIVIT,IRON,MINER 0.4MG-18MG
1200 TABLET ORAL 2 TIMES DAILY WITH MEALS
COMMUNITY
Start: 2018-05-31 | End: 2019-11-19

## 2018-05-17 RX ADMIN — METHOCARBAMOL 500 MG: 500 TABLET ORAL at 08:20

## 2018-05-17 RX ADMIN — ACETAMINOPHEN 975 MG: 325 TABLET, FILM COATED ORAL at 12:14

## 2018-05-17 RX ADMIN — VALACYCLOVIR HYDROCHLORIDE 500 MG: 500 TABLET, FILM COATED ORAL at 08:20

## 2018-05-17 RX ADMIN — OXYCODONE HYDROCHLORIDE 10 MG: 5 TABLET ORAL at 17:31

## 2018-05-17 RX ADMIN — SENNOSIDES AND DOCUSATE SODIUM 2 TABLET: 8.6; 5 TABLET ORAL at 08:20

## 2018-05-17 RX ADMIN — OXYCODONE HYDROCHLORIDE 5 MG: 5 TABLET ORAL at 02:37

## 2018-05-17 RX ADMIN — ONDANSETRON 4 MG: 4 TABLET, ORALLY DISINTEGRATING ORAL at 17:32

## 2018-05-17 RX ADMIN — POLYETHYLENE GLYCOL 3350 17 G: 17 POWDER, FOR SOLUTION ORAL at 08:21

## 2018-05-17 RX ADMIN — OXYCODONE HYDROCHLORIDE AND ACETAMINOPHEN 1000 MG: 500 TABLET ORAL at 08:20

## 2018-05-17 RX ADMIN — METHOCARBAMOL 500 MG: 500 TABLET ORAL at 02:37

## 2018-05-17 RX ADMIN — MULTIPLE VITAMINS W/ MINERALS TAB 1 TABLET: TAB at 08:21

## 2018-05-17 RX ADMIN — ACETAMINOPHEN 975 MG: 325 TABLET, FILM COATED ORAL at 05:31

## 2018-05-17 RX ADMIN — SODIUM PHOSPHATE 1 ENEMA: 7; 19 ENEMA RECTAL at 10:45

## 2018-05-17 ASSESSMENT — VISUAL ACUITY
OU: NORMAL ACUITY

## 2018-05-17 NOTE — PROGRESS NOTES
Calorie Count  Intake recorded for: 5/16  Kcals: 1717  Protein: 76g  # Meals Recorded: 3 meals (First - 50% omelet w/ veggies and sausage, apple sauce)      (Second - 100% orange, baked potato chips, hot black tea, 50% deli sandwich w/ roast beef, lettuce & tomato)      (Third - 100% peaches, skim milk, soy milk, 50% sweet & sour chicken stir mckeon w/ rice noodles)  # Supplements Recorded: 100% 1 Boost Plus

## 2018-05-17 NOTE — DISCHARGE SUMMARY
[facility] Discharge Summary and Instructions    Marcia Kauffman MRN# [id number]   Age: 67 year old YOB: 1950     Date of Admission:  5/14/2018  Date of Discharge::  5/17/2018  7:07 PM  Admitting Physician:  Nanette Beckford MD  Discharge Physician:  Nanette Beckford MD          Admission Diagnoses:   S/P lumbar fusion [Z98.1]          Discharge Diagnosis:   S/P lumbar fusion [Z98.1]          Procedures:   5/14/2018  1.  Posterior spinal fusion L2-S1.   2.  Segmental spinal instrumentation L2-S1.   3.  Pelvic fixation.   4.  Reinsertion spinal instrumentation, L4-L5.   5.  Oshea-Limon osteotomies L2-L3, L3-L4, L4-5.   6.  Transforaminal lumbar interbody fusions L2-3, L3-4, L4-5.   7.  Insertion of structural intervertebral device L2-3, L3-4, L4-5.            Brief History of Illness:   Marcia Kauffman is a 67-year-old woman who presented with a prior posterior lateral instrumented fusion at L4-L5 and transitional segment anatomy with fused disk spaces at L5-S1 and rudimentary S1-S2 disc space.  She had a pelvic incidence of 62 degrees and a lumbar lordosis of 36 degrees for significant pelvic incidence to lumbar lordosis mismatch.  The patient had symptomatic standing and ambulation intolerance with severe back pain.  She was counseled as to risks versus benefits of revision posterior fusion with potential extension up into the thoracic spine versus potential for correction being obtained solely in the lumbar spine.  After a full discussion of risks versus benefits, she elected to proceed.  Of note, she was diagnosed with osteoporosis and was maintained on teriparatide for several months prior to proceeding with surgery in order to improve her bone quality.             Hospital Course:   Following surgery the patient was monitored on the general Neurosurgical floor where she did well and remained medically and neurologically stable.   Ace was removed post operative day  #1 and was able to void independently.    Patient was evaluated by PT and OT who felt she was safe to discharge home with family and outpatient PT.    Due to poor bone quality the patient was fitted in a [place] brace post operatively.  This will need to be worn when head of bed is greater than 30 degrees and was continued on home Forteo dose post operatively.   Post operative Lumbar X-rays revealed proper positioning of surgical hardware.    Patient stated pain was well controlled post operatively, noting intermittent left anterior thigh numbness.  [place] Adalberto brace was uncomfortable and this was adjusted by Orthotics prior to discharge.   Patient was followed by nutrition services during stay, there plan of care as listed below:     MALNUTRITION (Limited d/t nursing care at the bedside)     % Intake: No decreased intake noted  % Weight Loss: None noted  Subcutaneous Fat Loss: Facial region and Upper arm: Mild  Muscle Loss: Temporal and Upper arm (bicep, tricep): Mild  Fluid Accumulation/Edema: Does not meet criteria (per chart review)    Signs and symptoms:  Mild subcutaneous fat loss, mild muscle loss    Treatment:  Nutrition consult, nutrition education, discussed utilizing nutritional supplements to help meet goal of 1.5 g protein per day, medical food supplement therapy, Vitamin C 1000 mg daily, Thera-Vit-M daily, Order calorie counts    Prior to discharge patient was voiding, passing bowel movements, tolerating diet, pain was controlled, hemoglobin  acceptable @ 9.9, surgical drain was removed, patient was ambulating independently and safely.  Patient was medically and neurologically stable at discharge.            Discharge Medications:     Current Discharge Medication List      START taking these medications    Details   methocarbamol (ROBAXIN) 500 MG tablet Take 1 tablet (500 mg) by mouth 4 times daily as needed for muscle spasms  Qty: 45 tablet, Refills: 0    Associated Diagnoses: S/P lumbar fusion       oxyCODONE IR (ROXICODONE) 5 MG tablet Take 1-2 tablets (5-10 mg) by mouth every 4 hours as needed for other (pain control or improvement in physical function. Hold dose for analgesic side effects.)  Qty: 60 tablet, Refills: 0    Associated Diagnoses: S/P lumbar fusion      polyethylene glycol (MIRALAX/GLYCOLAX) Packet Take 17 g by mouth 3 times daily  Qty: 7 packet, Refills: 1    Associated Diagnoses: S/P lumbar fusion      senna-docusate (SENOKOT-S;PERICOLACE) 8.6-50 MG per tablet Take 2 tablets by mouth 2 times daily  Qty: 30 tablet, Refills: 0    Associated Diagnoses: S/P lumbar fusion         CONTINUE these medications which have CHANGED    Details   Omega-3 Fatty Acids (OMEGA-3 FISH OIL) 1200 MG CAPS Take 1,200 mg by mouth 2 times daily (with meals) ON HOLD FOR SURGERY SINCE 05/01/2018    Comments: Please hold until 5/31         CONTINUE these medications which have NOT CHANGED    Details   Coenzyme Q10 (CO Q 10 PO) Take 1 chew tab by mouth every morning       DAILY MULTI VITAMIN/MINERALS OR 1 TABLET DAILY AT BREAKFAST ON HOLD FOR SURGERY SINCE 05/01/2018      FLAX SEED OIL OR takes in her cereal. STIOPPED 05/01/2018      GLUCOSAMINE CHONDR 500 COMPLEX OR CAPS 1 capsule daily at noon      magnesium citrate solution Take 296 mLs by mouth At Bedtime      montelukast (SINGULAIR) 10 MG tablet Take 1 tablet (10 mg) by mouth daily  Qty: 30 tablet, Refills: 11    Associated Diagnoses: Allergic urticaria      Probiotic Product (PROBIOTIC PO) Take by mouth every other day      teriparatide, recombinant, (FORTEO) 600 MCG/2.4ML SOLN injection Inject Subcutaneous every morning      UNABLE TO FIND Apply 0.25 tsp. topically 2 times daily MEDICATION NAME: Bio- estrol, plant based hormone replacement therapy       !! UNCLASSIFIED OTC PRODUCT MISC Calcium AEP 1-2 capsules by mouth daily  Supports cell membranes* TID      !! UNCLASSIFIED OTC PRODUCT MISC RelaxaMag  1 capsule at bedtime as needed -  a unique magnesium  supplement that helps improve sleep and stress tolerance in the evening      valACYclovir (VALTREX) 500 MG tablet Take 1 tablet (500 mg) by mouth 2 times daily For 3 days when flares occur  Qty: 30 tablet, Refills: 3    Associated Diagnoses: Herpes simplex virus (HSV) infection      VITAMIN C 500 MG OR TABS 2 tab daily AT BREAKFAST       !! - Potential duplicate medications found. Please discuss with provider.      STOP taking these medications       ibuprofen (ADVIL/MOTRIN) 200 MG tablet Comments:   Reason for Stopping:         nabumetone (RELAFEN) 500 MG tablet Comments:   Reason for Stopping:                       Discharge Instructions and Follow-Up:   Discharge diet: Regular   Discharge activity: You may advance activity as tolerated. No strenuous exercise or heay lifting greater than 10 lbs for 4 weeks or until seen and cleared in clinic.   Discharge follow-up: Follow-up with Holli Ny PA-C in 2 weeks    Follow-up with Dr. Nanette Beckford MD in 6 weeks   Wound care: Ok to shower,however no scrubbing of the wound and no soaking of the wound, meaning no bathtubs or swimming pools. Pat dry only. Leave wound open to air.  Sutures are not absorbable and need to be removed in 2 weeks. If patient still at rehab by this time, the sutures may be removed by the rehab physician if he or she considers that the wound has healed completely.     Please call if you have:  1. increased pain, redness, drainage, swelling at your incision  2. fevers > 101.5 F degrees  3. with any questions or concerns.  You may reach the Neurosurgery clinic at [phone] during regular work hours. ER at [phone].    and ask for the Neurosurgery Resident on call at [phone], during off hours or weekends.         Discharge Disposition:   Discharged to home        BRENT Woodward, CNP  Department of Neurosurgery  Pager: [phone]

## 2018-05-17 NOTE — PROGRESS NOTES
Hemovac drain removed at bedside, catheter tip intact upon removal.  Suction discontinued prior to removal.  Patient tolerated well.      BRENT Woodward, CNP  Department of Neurosurgery  Pager: [phone]

## 2018-05-17 NOTE — PLAN OF CARE
Problem: Patient Care Overview  Goal: Discharge Needs Assessment  Discharge medications and instructions reviewed to patient and her , both verbalized the understanding. Took all their belongings.Transportation via wheelchair to .

## 2018-05-17 NOTE — PLAN OF CARE
Problem: Patient Care Overview  Goal: Plan of Care/Patient Progress Review  Outcome: No Change  Pt POD#3 s/p L2-sacrum PLIF. VSS; T-max 99.5. Septic protocol triggered; Lactic 1.4. Neuro s intact except LLE 4/5. Pt incision covered with aquacel dressing; CDI. Hemovac in place; dressing CDI. Pt incisional pain managed with prn medications (see MAR). Pt up with SBA/W/Jewitt brace. Jewitt brace when HOB>30. Voiding. Pt given scheduled HS bowel medications no result this shift. Passing gas. Regular diet with calorie counts. Pt electrolytes replaced this shift; labs ordered for this morning. Possible DC home today with family. PIV KIZZY.  Continue POC.

## 2018-05-17 NOTE — PLAN OF CARE
Problem: Patient Care Overview  Goal: Plan of Care/Patient Progress Review  Outcome: No Change  POD #3 L2 through sacrum posterior lateral instrumented fusion. VSS. Pain adequately controlled with tylenol and robaxin x1. Neuros unchanged; L thigh and knee numbness. LLE 4/5. Pt received Adalberto brace today, needs to be on when HOB >30. Adjusted by orthotics this morning. Voiding spontaneously. Small BM this morning, Feet enema today, waiting for results. Hemovac in place, plan to remove today. PIV SL. Tolerating regular diet, calorie counts. Pt would like to shower after drain removal. Continue to monitor. Possible discharge home today.

## 2018-05-17 NOTE — PROGRESS NOTES
S: Feeling uncomfortable in her brace    O:  Exam:  General: Awake;  Alert, In No Acute Distress  Pulm: Breathing Comfortably on RA  Mental status: Oriented x 3  Cranial Nerves: Cranial Nerves II-XII Grossly Intact Bilaterally  Strength:      Del Tr Bi WE WF Gr  R 5 5 5 5 5 5  L 5 5 5 5 5 5     HF KE KF DF PF EHL  R 5 5 5 4* 5 5  L 5 5 5 5 5 5  *chronic    Pronator Drift: Absent  Sensory: Decreased over left thigh  Reflexes: No Hyperreflexia, Tena s or Clonus Present; Toes Down-Going Bilaterally  INCISION: c/d/i, covered with aquacell    A: Doing well post-operatively, working with therapies.    P:  Operation: Status post L2-S1 Posterior Instrumented Fusion and Correction of Flat Back Syndrome   Sutures out: 5/28/2018  Dressing change: 5/19  Drains: Suprafascial Lumbar Hemovac; Please empty and record output every 8 hours  Imaging: Upright AP/Lateral Lumbosacral X-Rays completed  Pain: Controlled; On Dilaudid 0.3-0.5 mg Q 2 HR PRN IVP; Oxycodone 5-10 mg Q 4 HR PRN; Robaxin 500 mg QID PRN  Blood pressure goals: SBP < 160 mmHg  Diet: Nutrition Consult; Regular Diet with 1.5G/KG protein/day for 3 months  Hematological goals: Platelets > 100k, INR < 1.5, Hemoglobin > 8; CBC, PT, INR Q 6 HR  Endo: Osteoporosis; Continue home Forteo Injections 600 mcg daily  PT/OT: Consults Placed; OT deferred, PT recommends TCU vs Home w/ Assist and Outpatient PT (5/516), exchange brace 5/17  DVT prophylaxis: Sequential compression devices to bilateral lower extremities  Ulcer prophylaxis: none indicated  DISPO:  Anticipate D/C Home 5/18  Barriers: Surgical drain, may remove today    -----------------------------------  Kathe Rivas MD, MS  Neurosurgery PGY-1

## 2018-05-17 NOTE — PLAN OF CARE
Problem: Patient Care Overview  Goal: Plan of Care/Patient Progress Review  PT - per plan established by the Physical Therapist, according to functional mobility the  discharge recommendation is home with assist as needed. Pt is progressing well with all functional mobility. Pt is SBA to IND for all bed mob, follow good spinal precautions no rail needed. Pt demo STS from bed, chair and commode IND. Pt demo amb up to 200'x 2 with WW. Pt demo amb with good upright posture and good step length. Pt is safe and stable for all amb with WW. Pt demo up and down 3 steps x 4 with one rail needing SBA. Pt ED on tech for getting in and out of car. Pt stating understanding of tech. Pt ed on walking program and  for home.   Discharge Planner PT   Patient plan for discharge: home with assist.   Current status: see above.   Barriers to return to prior living situation: spinal precautions.   Recommendations for discharge: home with assist as needed. OP PT to follow.   Rationale for recommendations: pt is progressing well, good support at home.     At time of d/c pt met 4/4 goals  Pt picking up walker, shower chair, elevated commode from [employer]. \  OP PT to follow.        Entered by: Cassius Darby 05/17/2018 2:40 PM

## 2018-05-18 ENCOUNTER — TELEPHONE (OUTPATIENT)
Dept: FAMILY MEDICINE | Facility: CLINIC | Age: 68
End: 2018-05-18

## 2018-05-18 ENCOUNTER — CARE COORDINATION (OUTPATIENT)
Dept: CARE COORDINATION | Facility: CLINIC | Age: 68
End: 2018-05-18

## 2018-05-18 LAB
BLD PROD TYP BPU: NORMAL
BLD UNIT ID BPU: 0
BLOOD PRODUCT CODE: NORMAL
BPU ID: NORMAL
TRANSFUSION STATUS PATIENT QL: NORMAL
TRANSFUSION STATUS PATIENT QL: NORMAL

## 2018-05-21 ENCOUNTER — TELEPHONE (OUTPATIENT)
Dept: NEUROSURGERY | Facility: CLINIC | Age: 68
End: 2018-05-21

## 2018-05-21 NOTE — TELEPHONE ENCOUNTER
ED / Discharge Outreach Protocol    Patient Contact    Attempt # 1    Was call answered?  No.  Left message with a male who answered the phone.     Camille MARIA RN

## 2018-05-22 NOTE — TELEPHONE ENCOUNTER
ED / Discharge Outreach Protocol    Patient Contact    Attempt # 2    Was call answered?  No.  Left message to return call to clinic    Shayy MORRIS Rn

## 2018-05-23 ENCOUNTER — TELEPHONE (OUTPATIENT)
Dept: NEUROSURGERY | Facility: CLINIC | Age: 68
End: 2018-05-23

## 2018-05-23 ENCOUNTER — DOCUMENTATION ONLY (OUTPATIENT)
Dept: NEUROSURGERY | Facility: CLINIC | Age: 68
End: 2018-05-23

## 2018-05-23 DIAGNOSIS — R11.0 NAUSEA: Primary | ICD-10-CM

## 2018-05-23 RX ORDER — PROCHLORPERAZINE MALEATE 10 MG
5-10 TABLET ORAL EVERY 6 HOURS PRN
Qty: 20 TABLET | Refills: 0 | Status: SHIPPED | OUTPATIENT
Start: 2018-05-23 | End: 2018-05-29

## 2018-05-23 NOTE — TELEPHONE ENCOUNTER
Touched base with pt regarding home health care orders.  They were entered yesterday by the NP.  Pt decided that she wants to use [facility] services.     Pt is struggling with pain and is having a hard time sleeping.  She is not using the Oxycodone makes her nauseated.  Writer spoke with SHELDON Ny and a script was written for Compazine and it was e-prescribed to pt's pharmacy.    Also, pt c/o the brace not fitting correctly because she has lost weight.  Writer called [facility] to see if the pt can have an appointment on 5/29 to get it adjusted.  Left word.

## 2018-05-23 NOTE — TELEPHONE ENCOUNTER
Writer called [facility] to alert them to OT and PT orders.  They will contact pt.

## 2018-05-23 NOTE — PROGRESS NOTES
Writer touched base with [facility].  They will reach out to the pt and set up an appointment with her on 5/29 to adjust her brace.  Pt stated it is loose because she has lost weight and it slides down.

## 2018-05-23 NOTE — TELEPHONE ENCOUNTER
"ED/Discharge Protocol    \"Hi, my name is Camille Rich, a registered nurse, and I am calling from the [facility].  I am calling to follow up and see how things are going for you after your recent visit.\"    \"I see that you were in the (ER/UC/IP) on 05/14/18 for spinal surgery.    How are you doing now that you are home?\" Just so-so, expected to be going to rehab center. Able to walk, doing stairs. A little trouble with bathing but has help from     Interested in home health and PT    Is patient experiencing symptoms that may require a hospital visit?  No    Discharge Instructions    \"Let's review your discharge instructions.  What is/are the follow-up recommendations?  Pt. Response: Appointment on 05/29/18 with Neurosurgery provider    \"Were you instructed to make a follow-up appointment?\"  Pt. Response: Yes.  Has appointment been made?   Yes      \"When you see the provider, I would recommend that you bring your discharge instructions with you.    Medications    \"How many new medications are you on since your hospitalization/ED visit?\"    2 or more - [facility] MTM referral needed  \"How many of your current medicines changed (dose, timing, name, etc.) while you were in the hospital/ED visit?\"   2 or more - [facility] MTM referral needed  \"Do you have questions about your medications?\"   No  \"Were you newly diagnosed with heart failure, COPD, diabetes or did you have a heart attack?\"   No  For patients on insulin: \"Did you start on insulin in the hospital or did you have your insulin dose changed?\"   No  Medication reconciliation completed? Yes    Call Summary    \"Do you have any questions or concerns about your condition or care plan at the moment?\"    No  Triage nurse advice given: n/a    Patient was in ER 0 in the past year (assess appropriateness of ER visits.)      \"If you have questions or things don't continue to improve, we encourage you contact us through the main clinic number.  Even if the clinic is " "not open, triage nurses are available 24/7 to help you.     We would like you to know that our clinic has extended hours (provide information).  We also have urgent care (provide details on closest location and hours/contact info)\"      \"Thank you for your time and take care!\"    Patient is an [facility] patient but had surgery at [facility]     Camille MARIA RN        "

## 2018-05-24 ENCOUNTER — TELEPHONE (OUTPATIENT)
Dept: NEUROSURGERY | Facility: CLINIC | Age: 68
End: 2018-05-24

## 2018-05-25 ENCOUNTER — DOCUMENTATION ONLY (OUTPATIENT)
Dept: OTHER | Facility: CLINIC | Age: 68
End: 2018-05-25

## 2018-05-25 DIAGNOSIS — Z71.89 ADVANCED DIRECTIVES, COUNSELING/DISCUSSION: Chronic | ICD-10-CM

## 2018-05-25 NOTE — PROGRESS NOTES
Neurosurgery clinic post op wound check  Date of visit: 5/29/2018      Procedure:  05/14/2018  SAGAR Rowe   DIAGNOSES:     1.  Flat back syndrome.    2.  Previous L4-L5 posterior decompression and posterior lateral instrumented fusion.   3.  Osteoporosis.       PROCEDURE PERFORMED:   1.  Stealth-guided L2 through sacrum posterior lateral instrumented fusion with local harvest autograft and allograft.   2.  Removal and replacement of bilateral L4-L5 instrumentation.   3.  Pelvic fixation with S2 alar iliac screws.   4.  Bilateral transforaminal lumbar interbody fusion L2-L3, L3-L4, L4-L5.   5.  Bilateral Oshea-Limon osteotomies for deformity correction, bilateral L2-L3, L3-L4 and L4-L5.     INDICATIONS FOR SURGERY:  Marcia Kauffman is a 67-year-old woman who presented with a prior posterior lateral instrumented fusion at L4-L5 and transitional segment anatomy with fused disk spaces at L5-S1 and rudimentary S1-S2 disc space.  She had a pelvic incidence of 62 degrees and a lumbar lordosis of 36 degrees for significant pelvic incidence to lumbar lordosis mismatch.  The patient had symptomatic standing and ambulation intolerance with severe back pain.  She was counseled as to risks versus benefits of revision posterior fusion with potential extension up into the thoracic spine versus potential for correction being obtained solely in the lumbar spine.  After a full discussion of risks versus benefits, she elected to proceed.  Of note, she was diagnosed with osteoporosis and was maintained on teriparatide for several months prior to proceeding with surgery in order to improve her bone quality.       A 2 surgeon approach was used to minimize operative blood loss and operative time given the complex deformity correction required.   HPI:  Inpatient:   Following surgery the patient was monitored on the general Neurosurgical floor where she did well and remained medically and neurologically stable.   Db  was removed post operative day #1 and was able to void independently.    Patient was evaluated by PT and OT who felt she was safe to discharge home with family and outpatient PT.    Due to poor bone quality the patient was fitted in a Adalberto brace post operatively.  This will need to be worn when head of bed is greater than 30 degrees and was continued on home Forteo dose post operatively.   Post operative Lumbar X-rays revealed proper positioning of surgical hardware.    Patient stated pain was well controlled post operatively, noting intermittent left anterior thigh numbness.  Orlando Barlow brace was uncomfortable and this was adjusted by Orthotics prior to discharge.   Patient was followed by nutrition services during stay, there plan of care as listed below:   MALNUTRITION (Limited d/t nursing care at the bedside)  % Intake: No decreased intake noted  % Weight Loss: None noted  Subcutaneous Fat Loss: Facial region and Upper arm: Mild  Muscle Loss: Temporal and Upper arm (bicep, tricep): Mild  Fluid Accumulation/Edema: Does not meet criteria (per chart review)  Signs and symptoms:  Mild subcutaneous fat loss, mild muscle loss  Treatment:  Nutrition consult, nutrition education, discussed utilizing nutritional supplements to help meet goal of 1.5 g protein per day, medical food supplement therapy, Vitamin C 1000 mg daily, Thera-Vit-M daily, Order calorie counts  Prior to discharge patient was voiding, passing bowel movements, tolerating diet, pain was controlled, hemoglobin  acceptable @ 9.9, surgical drain was removed, patient was ambulating independently and safely.  Patient was medically and neurologically stable at discharge  Outpatient:   She is now 2 weeks post op.     Since discharge she has weaned herself down on her pain meds quite a lot.  She is not sleeping well at night though. She was quite nervous about going home without any physical therapy or home care, she was quite nervous about going directly  home and not to a TCU. She had many concerns about this and talked about it quite extensively. She felt she qualified under Medicare guidelines to receive home care and wanted to know why she did not have it prescribed, or at least didn't talk to a  about it.  She presents for routine wound check. Her weight was checked today and has remained stable. She reports she is eating adequate amounts of protein.    STATUS REPORT  WORK:         Is not back at work.  Retired  ACTIVITY:     Has been following activity restrictions.  Now wants to start:   PT and exercising  MEDS:                  Pain            Tylenol during the day, oxycodone at night only, Robaxin during the day         NSAIDS    no  NICOTINE:  no   25 OH Total:       Patient Supplied Answers To the [facility] Pain Questionnaire  [facility] Pain -  Patient Entered Questionnaire/Answers 5/27/2018   What number best describes your pain right now:  0 = No pain  to  10 = Worst pain imaginable -   How would you describe the pain? sharp, numbness, dull, aching, pressure   Which of the following worsen your pain? standing, sitting, walking   Which of the following improve or reduce your pain?  lying down, walking, medication, relaxation   What number best describes your average pain for the past week:  0 = No pain  to  10 = Worst pain imaginable 5   What number best describes your LOWEST pain in past 24 hours:  0 = No pain  to  10 = Worst pain imaginable 4   What number best describes your WORST pain in past 24 hours:  0 = No pain  to  10 = Worst pain imaginable 7   When is your pain worst? PM   What non-medicine treatments have you already had for your pain? surgery   Have you tried treating your pain with medication?  Yes   Are you currently taking medications for your pain? Yes            Current Outpatient Prescriptions:      Coenzyme Q10 (CO Q 10 PO), Take 1 chew tab by mouth every morning , Disp: , Rfl:      DAILY MULTI VITAMIN/MINERALS OR, 1 TABLET DAILY AT  BREAKFAST ON HOLD FOR SURGERY SINCE 05/01/2018, Disp: , Rfl:      FLAX SEED OIL OR, takes in her cereal. STIOPPED 05/01/2018, Disp: , Rfl:      GLUCOSAMINE CHONDR 500 COMPLEX OR CAPS, 1 capsule daily at noon, Disp: , Rfl:      magnesium citrate solution, Take 296 mLs by mouth At Bedtime, Disp: , Rfl:      methocarbamol (ROBAXIN) 500 MG tablet, Take 1 tablet (500 mg) by mouth 4 times daily as needed for muscle spasms, Disp: 45 tablet, Rfl: 0     montelukast (SINGULAIR) 10 MG tablet, Take 1 tablet (10 mg) by mouth daily (Patient taking differently: Take 10 mg by mouth daily as needed ), Disp: 30 tablet, Rfl: 11     [START ON 5/31/2018] Omega-3 Fatty Acids (OMEGA-3 FISH OIL) 1200 MG CAPS, Take 1,200 mg by mouth 2 times daily (with meals) ON HOLD FOR SURGERY SINCE 05/01/2018, Disp: , Rfl:      oxyCODONE IR (ROXICODONE) 5 MG tablet, Take 1-2 tablets (5-10 mg) by mouth every 4 hours as needed for other (pain control or improvement in physical function. Hold dose for analgesic side effects.), Disp: 60 tablet, Rfl: 0     polyethylene glycol (MIRALAX/GLYCOLAX) Packet, Take 17 g by mouth 3 times daily, Disp: 7 packet, Rfl: 1     Probiotic Product (PROBIOTIC PO), Take by mouth every other day, Disp: , Rfl:      prochlorperazine (COMPAZINE) 10 MG tablet, Take 0.5-1 tablets (5-10 mg) by mouth every 6 hours as needed for nausea or vomiting 1/2 tablet to one tablet every 6 hours PRN for nausea., Disp: 20 tablet, Rfl: 0     senna-docusate (SENOKOT-S;PERICOLACE) 8.6-50 MG per tablet, Take 2 tablets by mouth 2 times daily, Disp: 30 tablet, Rfl: 0     teriparatide, recombinant, (FORTEO) 600 MCG/2.4ML SOLN injection, Inject Subcutaneous every morning, Disp: , Rfl:      UNABLE TO FIND, Apply 0.25 tsp. topically 2 times daily MEDICATION NAME: Bio- estrol, plant based hormone replacement therapy , Disp: , Rfl:      UNCLASSIFIED OTC PRODUCT MISC, Calcium AEP 1-2 capsules by mouth daily  Supports cell membranes* TID, Disp: , Rfl:       "UNCLASSIFIED OTC PRODUCT MISC, RelaxaMag  1 capsule at bedtime as needed -  a unique magnesium supplement that helps improve sleep and stress tolerance in the evening, Disp: , Rfl:      valACYclovir (VALTREX) 500 MG tablet, Take 1 tablet (500 mg) by mouth 2 times daily For 3 days when flares occur (Patient taking differently: Take 500 mg by mouth every morning For 3 days when flares occur), Disp: 30 tablet, Rfl: 3     VITAMIN C 500 MG OR TABS, 2 tab daily AT BREAKFAST, Disp: , Rfl:   Allergies   Allergen Reactions     Dust Mite Extract      Hydrocodone Nausea and Vomiting     n/v     Meperidine Nausea and Vomiting     vomiting     Mold      Trees      PMH, SOC HIST, FAM HIST, PROBLEM LIST:  All reviewed in EPIC.    OBJECTIVE:  /67  Pulse 75  Temp 98.3  F (36.8  C) (Oral)  Resp 24  Ht 1.638 m (5' 4.5\")  Wt 57.2 kg (126 lb 1.6 oz)  SpO2 100%  Breastfeeding? No  BMI 21.31 kg/m2    Imaging:  None new.    (These are the pertinent findings from:  Xrays taken today. none  My impression: Overall alignment of the spine in 2 planes is satisfactory and unchanged from post op. Hardware is in good position without evidence failure.  Radiologist's report:  Please see Epic for the bulk of the report.  I personally reviewed the images with the patient)    EXAM:  Well developed well nourished female found seated comfortably in exam chair.  No apparent distress. She is accompanied.  A&O X3.  Mood and affect WNL. Language and fund of knowledge intact.  Is able to sit and rise independently.   She has a nicely healing incision.  I prepped the wound with chloroprep and solvent. No sutures to remove.  Wearing brace appropriately.    Exam at discharge:   Hip Flexion  left     5  right     5   Knee Extension 5 5   Ankle Dorsiflexion 5 4   Extensor Hallucis Longus 5 3   Plantar Flexion 5 5   Foot eversion 5 5   Foot inversion 5 5       Exam today:  Hip Flexion  left     5  right     5   Knee Extension 5 5   Ankle Dorsiflexion " 5 4+   Extensor Hallucis Longus 5 3+   Plantar Flexion 5 5   Foot eversion 5 5   Foot inversion 5 5       Assessment/Plan:  1. Flatback syndrome of thoracolumbar region    2. Pseudoarthrosis of lumbar spine    3. Other osteoporosis without current pathological fracture    4. Vitamin D deficiency    5. Post-operative state      Marcia Kuaffman is doing well after her large lumbar reconstruction for flat back. She was quite nervous about going home after surgery more abruptly than she anticipated, and felt she deserved to be in a TCU, or some kind of care center. In reality she has done extremely well, but we spent an awful long time today talking about how she felt as though she should have had some more help, instruction, or care upon discharge.   We discussed wound care.  *  May shower and shampoo with mild soap/shampoo including the incision. *  May swim or bathe when all scabbing is gone from the incision.  We discussed medication.    *  FYI: In general we prescribe narcotics for pain management in the post operative recovery phase generally 2-6 weeks post op, depending on the surgery performed.  *  Medications prescribed today:  Oxycodone, methocarbamol,Compazine E-prescribed and/or Printed today and handed to the patient. I believe she is a little underdosed on her pain meds, particularly at night.. I recommend she try 1-1/2 pain pills at night for sleep and if that is not helpful then consider speaking to her primary about a sleeping medication.   *  Continue to avoid NSAIDs until 3 months post op.  We discussed activities and return to work.  *  We recommend she continue the current activity restrictions until she returns for the next visit. We do not recommend formal physical therapy or exercising until she is 6 weeks postop. She may do light stretching so long as it does not Hyperflex or extend her lumbar spine.  *   She can return to driving if: she is off narcotic    We discussed follow up   *  Return  to clinic in 4 weeks.  Imaging for that visit: Plain XR and 25 OH.  10 weeks with Dr Beckford and plain XR.   *  All the patient's questions have been answered and they demonstrate good understanding of the above.    *   The patient has our contact information and is aware that she should call if she has questions comments or concerns.     We appreciate the opportunity to be of service in the care of this pleasant patient.  Please do call if there is anything more we can do    Holli Ny PA-C  [facility]  Department of Neurosurgery  Phone: [phone]  Fax: [phone]    I spent 40 minutes of a 30 minute visit with this patient today discussing  pine pathology, postoperative care, expectations, and managing plans.  This note was generated using voice recognition software. While edited for content some inaccurate phrasing may be found.

## 2018-05-29 ENCOUNTER — OFFICE VISIT (OUTPATIENT)
Dept: NEUROSURGERY | Facility: CLINIC | Age: 68
End: 2018-05-29
Payer: COMMERCIAL

## 2018-05-29 VITALS
RESPIRATION RATE: 24 BRPM | TEMPERATURE: 98.3 F | HEART RATE: 75 BPM | DIASTOLIC BLOOD PRESSURE: 67 MMHG | OXYGEN SATURATION: 100 % | WEIGHT: 126.1 LBS | SYSTOLIC BLOOD PRESSURE: 113 MMHG | HEIGHT: 65 IN | BODY MASS INDEX: 21.01 KG/M2

## 2018-05-29 DIAGNOSIS — R11.0 NAUSEA: ICD-10-CM

## 2018-05-29 DIAGNOSIS — Z98.1 S/P LUMBAR FUSION: ICD-10-CM

## 2018-05-29 DIAGNOSIS — M81.8 OTHER OSTEOPOROSIS WITHOUT CURRENT PATHOLOGICAL FRACTURE: ICD-10-CM

## 2018-05-29 DIAGNOSIS — Z98.890 POST-OPERATIVE STATE: ICD-10-CM

## 2018-05-29 DIAGNOSIS — S32.009K PSEUDOARTHROSIS OF LUMBAR SPINE: ICD-10-CM

## 2018-05-29 DIAGNOSIS — E55.9 VITAMIN D DEFICIENCY: ICD-10-CM

## 2018-05-29 DIAGNOSIS — M40.35 FLATBACK SYNDROME OF THORACOLUMBAR REGION: Primary | ICD-10-CM

## 2018-05-29 PROBLEM — M40.36 FLATBACK SYNDROME OF LUMBAR REGION: Status: ACTIVE | Noted: 2018-02-01

## 2018-05-29 PROBLEM — M47.26 OTHER SPONDYLOSIS WITH RADICULOPATHY, LUMBAR REGION: Status: ACTIVE | Noted: 2017-07-03

## 2018-05-29 PROBLEM — R20.2 NUMBNESS AND TINGLING IN LEFT HAND: Status: ACTIVE | Noted: 2017-07-03

## 2018-05-29 PROBLEM — R20.0 NUMBNESS AND TINGLING IN LEFT HAND: Status: ACTIVE | Noted: 2017-07-03

## 2018-05-29 PROBLEM — M81.0 OSTEOPOROSIS: Status: ACTIVE | Noted: 2018-02-01

## 2018-05-29 PROBLEM — M53.3 SACROILIAC JOINT DYSFUNCTION OF RIGHT SIDE: Status: ACTIVE | Noted: 2017-07-03

## 2018-05-29 RX ORDER — PROCHLORPERAZINE MALEATE 10 MG
5-10 TABLET ORAL EVERY 6 HOURS PRN
Qty: 20 TABLET | Refills: 0 | Status: SHIPPED | OUTPATIENT
Start: 2018-05-29 | End: 2018-06-25

## 2018-05-29 RX ORDER — METHOCARBAMOL 500 MG/1
500 TABLET, FILM COATED ORAL 4 TIMES DAILY PRN
Qty: 45 TABLET | Refills: 0 | Status: SHIPPED | OUTPATIENT
Start: 2018-05-29 | End: 2018-08-20

## 2018-05-29 RX ORDER — OXYCODONE HYDROCHLORIDE 5 MG/1
5-10 TABLET ORAL EVERY 4 HOURS PRN
Qty: 60 TABLET | Refills: 0 | Status: SHIPPED | OUTPATIENT
Start: 2018-05-29 | End: 2018-06-25

## 2018-05-29 ASSESSMENT — PAIN SCALES - GENERAL: PAINLEVEL: MODERATE PAIN (5)

## 2018-05-29 NOTE — MR AVS SNAPSHOT
After Visit Summary   5/29/2018    Marcia Kauffman    MRN: [id number]           Patient Information     Date Of Birth          1950        Visit Information        Provider Department      5/29/2018 5:30 PM Holli Ny PA-C [facility] Neurosurgery        Today's Diagnoses     Flatback syndrome of thoracolumbar region    -  1    Pseudoarthrosis of lumbar spine        Other osteoporosis without current pathological fracture        Vitamin D deficiency        Post-operative state        S/P lumbar fusion        Nausea           Follow-ups after your visit        Future tests that were ordered for you today     Open Future Orders        Priority Expected Expires Ordered    25 Hydroxyvitamin D2 and D3 Routine  5/30/2019 5/29/2018    X-ray Lumbar spine 2-3 views (AP and lateral - standing views preferred) [IMG66] Routine 5/29/2018 5/29/2019 5/29/2018            Who to contact     Please call your clinic at [phone] to:    Ask questions about your health    Make or cancel appointments    Discuss your medicines    Learn about your test results    Speak to your doctor            Additional Information About Your Visit        [employer] Information     The [employer] gives you secure access to your electronic health record. If you see a primary care provider, you can also send messages to your care team and make appointments. If you have questions, please call your primary care clinic.  If you do not have a primary care provider, please call [phone] and they will assist you.      The [employer] is an electronic gateway that provides easy, online access to your medical records. With [employer], you can request a clinic appointment, read your test results, renew a prescription or communicate with your care team.     To access your existing account, please contact your [facility] or call [phone] for assistance.        Care EveryWhere ID     This is your Care EveryWhere ID.  "This could be used by other organizations to access your [facility] medical records  [id number]        Your Vitals Were     Pulse Temperature Respirations Height Pulse Oximetry Breastfeeding?    75 98.3  F (36.8  C) (Oral) 24 1.638 m (5' 4.5\") 100% No    BMI (Body Mass Index)                   21.31 kg/m2            Blood Pressure from Last 3 Encounters:   05/29/18 113/67   05/17/18 108/55   05/07/18 132/73    Weight from Last 3 Encounters:   05/29/18 57.2 kg (126 lb 1.6 oz)   05/14/18 57 kg (125 lb 10.6 oz)   05/07/18 57.5 kg (126 lb 12.8 oz)                 Today's Medication Changes          These changes are accurate as of 5/29/18  7:22 PM.  If you have any questions, ask your nurse or doctor.               These medicines have changed or have updated prescriptions.        Dose/Directions    montelukast 10 MG tablet   Commonly known as:  SINGULAIR   This may have changed:    - when to take this  - reasons to take this   Used for:  Allergic urticaria        Dose:  10 mg   Take 1 tablet (10 mg) by mouth daily   Quantity:  30 tablet   Refills:  11       valACYclovir 500 MG tablet   Commonly known as:  VALTREX   This may have changed:    - when to take this  - additional instructions   Used for:  Herpes simplex virus (HSV) infection        Dose:  500 mg   Take 1 tablet (500 mg) by mouth 2 times daily For 3 days when flares occur   Quantity:  30 tablet   Refills:  3            Where to get your medicines      [facility] [place] [address]  [address], [place] [address]    Hours:  SUHAIL [place] Phone:  [phone]     methocarbamol 500 MG tablet    prochlorperazine 10 MG tablet         Some of these will need a paper prescription and others can be bought over the counter.  Ask your nurse if you have questions.     Bring a paper prescription for each of these medications     oxyCODONE IR 5 MG tablet               Information " about OPIOIDS     PRESCRIPTION OPIOIDS: WHAT YOU NEED TO KNOW   You have a prescription for an opioid (narcotic) pain medicine. Opioids can cause addiction. If you have a history of chemical dependency of any type, you are at a higher risk of becoming addicted to opioids. Only take this medicine after all other options have been tried. Take it for as short a time and as few doses as possible.     Do not:    Drive. If you drive while taking these medicines, you could be arrested for driving under the influence (DUI).    Operate heavy machinery    Do any other dangerous activities while taking these medicines.     Drink any alcohol while taking these medicines.      Take with any other medicines that contain acetaminophen. Read all labels carefully. Look for the word  acetaminophen  or  Tylenol.  Ask your pharmacist if you have questions or are unsure.    Store your pills in a secure place, locked if possible. We will not replace any lost or stolen medicine. If you don t finish your medicine, please throw away (dispose) as directed by your pharmacist. The [place] Pollution Control Agency has more information about safe disposal: https://www.pca.[facility].us/living-green/managing-unwanted-medications    All opioids tend to cause constipation. Drink plenty of water and eat foods that have a lot of fiber, such as fruits, vegetables, prune juice, apple juice and high-fiber cereal. Take a laxative (Miralax, milk of magnesia, Colace, Senna) if you don t move your bowels at least every other day.          Primary Care Provider Office Phone # Fax #    Zhane Malgorzata Yost [phone]       [address] [address]        Equal Access to Services     [facility]: Hadkai Moore, nitesh crowe, qagisella kadora salinas . [facility] [phone].    ATENCIÓN: Si habla español, tiene a montez disposición servicios gratuitos  de asistencia lingüística. Geovanna love [phone].    We comply with applicable federal civil rights laws and [place] laws. We do not discriminate on the basis of race, color, national origin, age, disability, sex, sexual orientation, or gender identity.            Thank you!     Thank you for choosing [facility]  for your care. Our goal is always to provide you with excellent care. Hearing back from our patients is one way we can continue to improve our services. Please take a few minutes to complete the written survey that you may receive in the mail after your visit with us. Thank you!             Your Updated Medication List - Protect others around you: Learn how to safely use, store and throw away your medicines at www.disposemymeds.org.          This list is accurate as of 5/29/18  7:22 PM.  Always use your most recent med list.                   Brand Name Dispense Instructions for use Diagnosis    ascorbic acid 500 MG tablet    VITAMIN C     2 tab daily AT BREAKFAST        CO Q 10 PO      Take 1 chew tab by mouth every morning        DAILY MULTI VITAMIN/MINERALS PO      1 TABLET DAILY AT BREAKFAST ON HOLD FOR SURGERY SINCE 05/01/2018        FLAX SEED OIL PO      takes in her cereal. STIOPPED 05/01/2018        FORTEO 600 MCG/2.4ML Soln injection   Generic drug:  teriparatide (recombinant)      Inject Subcutaneous every morning        glucosamine chondroitin 500 complex Caps      1 capsule daily at noon        magnesium citrate solution      Take 296 mLs by mouth At Bedtime        methocarbamol 500 MG tablet    ROBAXIN    45 tablet    Take 1 tablet (500 mg) by mouth 4 times daily as needed for muscle spasms    S/P lumbar fusion       montelukast 10 MG tablet    SINGULAIR    30 tablet    Take 1 tablet (10 mg) by mouth daily    Allergic urticaria       Omega-3 Fish Oil 1200 MG Caps   Start taking on:  5/31/2018      Take 1,200 mg by mouth 2 times daily (with meals) ON HOLD FOR SURGERY SINCE  05/01/2018        oxyCODONE IR 5 MG tablet    ROXICODONE    60 tablet    Take 1-2 tablets (5-10 mg) by mouth every 4 hours as needed for other (pain control or improvement in physical function. Hold dose for analgesic side effects.)    S/P lumbar fusion       polyethylene glycol Packet    MIRALAX/GLYCOLAX    7 packet    Take 17 g by mouth 3 times daily    S/P lumbar fusion       PROBIOTIC PO      Take by mouth every other day        prochlorperazine 10 MG tablet    COMPAZINE    20 tablet    Take 0.5-1 tablets (5-10 mg) by mouth every 6 hours as needed for nausea or vomiting 1/2 tablet to one tablet every 6 hours PRN for nausea.    Nausea       senna-docusate 8.6-50 MG per tablet    SENOKOT-S;PERICOLACE    30 tablet    Take 2 tablets by mouth 2 times daily    S/P lumbar fusion       UNABLE TO FIND      Apply 0.25 tsp. topically 2 times daily MEDICATION NAME: Bio- estrol, plant based hormone replacement therapy        * UNCLASSIFIED OTC PRODUCT Misc      Calcium AEP 1-2 capsules by mouth daily ?Supports cell membranes* TID        * UNCLASSIFIED OTC PRODUCT Misc      RelaxaMag? 1 capsule at bedtime as needed -  a unique magnesium supplement that helps improve sleep and stress tolerance in the evening        valACYclovir 500 MG tablet    VALTREX    30 tablet    Take 1 tablet (500 mg) by mouth 2 times daily For 3 days when flares occur    Herpes simplex virus (HSV) infection       * Notice:  This list has 2 medication(s) that are the same as other medications prescribed for you. Read the directions carefully, and ask your doctor or other care provider to review them with you.

## 2018-05-29 NOTE — NURSING NOTE
Chief Complaint   Patient presents with     RECHECK     UMP- 2 WEEKS POST-OP F/U     Tani Wakefield, CMA

## 2018-05-29 NOTE — LETTER
5/29/2018       RE: Marcia Kauffman  [id number]  [facility] [address]     Dear Colleague,    Thank you for referring your patient, Marcia Kauffman, to the [facility] NEUROSURGERY at [facility]. Please see a copy of my visit note below.      Neurosurgery clinic post op wound check  Date of visit: 5/29/2018      Procedure:  05/14/2018  SAGAR Rowe   DIAGNOSES:     1.  Flat back syndrome.    2.  Previous L4-L5 posterior decompression and posterior lateral instrumented fusion.   3.  Osteoporosis.       PROCEDURE PERFORMED:   1.  Stealth-guided L2 through sacrum posterior lateral instrumented fusion with local harvest autograft and allograft.   2.  Removal and replacement of bilateral L4-L5 instrumentation.   3.  Pelvic fixation with S2 alar iliac screws.   4.  Bilateral transforaminal lumbar interbody fusion L2-L3, L3-L4, L4-L5.   5.  Bilateral Oshea-Limon osteotomies for deformity correction, bilateral L2-L3, L3-L4 and L4-L5.     INDICATIONS FOR SURGERY:  Marcia Kauffman is a 67-year-old woman who presented with a prior posterior lateral instrumented fusion at L4-L5 and transitional segment anatomy with fused disk spaces at L5-S1 and rudimentary S1-S2 disc space.  She had a pelvic incidence of 62 degrees and a lumbar lordosis of 36 degrees for significant pelvic incidence to lumbar lordosis mismatch.  The patient had symptomatic standing and ambulation intolerance with severe back pain.  She was counseled as to risks versus benefits of revision posterior fusion with potential extension up into the thoracic spine versus potential for correction being obtained solely in the lumbar spine.  After a full discussion of risks versus benefits, she elected to proceed.  Of note, she was diagnosed with osteoporosis and was maintained on teriparatide for several months prior to proceeding with surgery in order to improve her bone quality.       A 2 surgeon  approach was used to minimize operative blood loss and operative time given the complex deformity correction required.   HPI:  Inpatient:   Following surgery the patient was monitored on the general Neurosurgical floor where she did well and remained medically and neurologically stable.   Ace was removed post operative day #1 and was able to void independently.    Patient was evaluated by PT and OT who felt she was safe to discharge home with family and outpatient PT.    Due to poor bone quality the patient was fitted in a Adalberto brace post operatively.  This will need to be worn when head of bed is greater than 30 degrees and was continued on home Forteo dose post operatively.   Post operative Lumbar X-rays revealed proper positioning of surgical hardware.    Patient stated pain was well controlled post operatively, noting intermittent left anterior thigh numbness.  Adams Lanark brace was uncomfortable and this was adjusted by Orthotics prior to discharge.   Patient was followed by nutrition services during stay, there plan of care as listed below:   MALNUTRITION (Limited d/t nursing care at the bedside)  % Intake: No decreased intake noted  % Weight Loss: None noted  Subcutaneous Fat Loss: Facial region and Upper arm: Mild  Muscle Loss: Temporal and Upper arm (bicep, tricep): Mild  Fluid Accumulation/Edema: Does not meet criteria (per chart review)  Signs and symptoms:  Mild subcutaneous fat loss, mild muscle loss  Treatment:  Nutrition consult, nutrition education, discussed utilizing nutritional supplements to help meet goal of 1.5 g protein per day, medical food supplement therapy, Vitamin C 1000 mg daily, Thera-Vit-M daily, Order calorie counts  Prior to discharge patient was voiding, passing bowel movements, tolerating diet, pain was controlled, hemoglobin  acceptable @ 9.9, surgical drain was removed, patient was ambulating independently and safely.  Patient was medically and neurologically stable at  discharge  Outpatient:   She is now 2 weeks post op.     Since discharge she has weaned herself down on her pain meds quite a lot.  She is not sleeping well at night though. She was quite nervous about going home without any physical therapy or home care, she was quite nervous about going directly home and not to a TCU. She had many concerns about this and talked about it quite extensively. She felt she qualified under Medicare guidelines to receive home care and wanted to know why she did not have it prescribed, or at least didn't talk to a  about it.  She presents for routine wound check. Her weight was checked today and has remained stable. She reports she is eating adequate amounts of protein.    STATUS REPORT  WORK:         Is not back at work.  Retired  ACTIVITY:     Has been following activity restrictions.  Now wants to start:   PT and exercising  MEDS:                  Pain            Tylenol during the day, oxycodone at night only, Robaxin during the day         NSAIDS    no  NICOTINE:  no   25 OH Total:       Patient Supplied Answers To the [facility] Pain Questionnaire  [facility] Pain -  Patient Entered Questionnaire/Answers 5/27/2018   What number best describes your pain right now:  0 = No pain  to  10 = Worst pain imaginable -   How would you describe the pain? sharp, numbness, dull, aching, pressure   Which of the following worsen your pain? standing, sitting, walking   Which of the following improve or reduce your pain?  lying down, walking, medication, relaxation   What number best describes your average pain for the past week:  0 = No pain  to  10 = Worst pain imaginable 5   What number best describes your LOWEST pain in past 24 hours:  0 = No pain  to  10 = Worst pain imaginable 4   What number best describes your WORST pain in past 24 hours:  0 = No pain  to  10 = Worst pain imaginable 7   When is your pain worst? PM   What non-medicine treatments have you already had for your pain? surgery   Have  you tried treating your pain with medication?  Yes   Are you currently taking medications for your pain? Yes            Current Outpatient Prescriptions:      Coenzyme Q10 (CO Q 10 PO), Take 1 chew tab by mouth every morning , Disp: , Rfl:      DAILY MULTI VITAMIN/MINERALS OR, 1 TABLET DAILY AT BREAKFAST ON HOLD FOR SURGERY SINCE 05/01/2018, Disp: , Rfl:      FLAX SEED OIL OR, takes in her cereal. STIOPPED 05/01/2018, Disp: , Rfl:      GLUCOSAMINE CHONDR 500 COMPLEX OR CAPS, 1 capsule daily at noon, Disp: , Rfl:      magnesium citrate solution, Take 296 mLs by mouth At Bedtime, Disp: , Rfl:      methocarbamol (ROBAXIN) 500 MG tablet, Take 1 tablet (500 mg) by mouth 4 times daily as needed for muscle spasms, Disp: 45 tablet, Rfl: 0     montelukast (SINGULAIR) 10 MG tablet, Take 1 tablet (10 mg) by mouth daily (Patient taking differently: Take 10 mg by mouth daily as needed ), Disp: 30 tablet, Rfl: 11     [START ON 5/31/2018] Omega-3 Fatty Acids (OMEGA-3 FISH OIL) 1200 MG CAPS, Take 1,200 mg by mouth 2 times daily (with meals) ON HOLD FOR SURGERY SINCE 05/01/2018, Disp: , Rfl:      oxyCODONE IR (ROXICODONE) 5 MG tablet, Take 1-2 tablets (5-10 mg) by mouth every 4 hours as needed for other (pain control or improvement in physical function. Hold dose for analgesic side effects.), Disp: 60 tablet, Rfl: 0     polyethylene glycol (MIRALAX/GLYCOLAX) Packet, Take 17 g by mouth 3 times daily, Disp: 7 packet, Rfl: 1     Probiotic Product (PROBIOTIC PO), Take by mouth every other day, Disp: , Rfl:      prochlorperazine (COMPAZINE) 10 MG tablet, Take 0.5-1 tablets (5-10 mg) by mouth every 6 hours as needed for nausea or vomiting 1/2 tablet to one tablet every 6 hours PRN for nausea., Disp: 20 tablet, Rfl: 0     senna-docusate (SENOKOT-S;PERICOLACE) 8.6-50 MG per tablet, Take 2 tablets by mouth 2 times daily, Disp: 30 tablet, Rfl: 0     teriparatide, recombinant, (FORTEO) 600 MCG/2.4ML SOLN injection, Inject Subcutaneous every  "morning, Disp: , Rfl:      UNABLE TO FIND, Apply 0.25 tsp. topically 2 times daily MEDICATION NAME: Bio- estrol, plant based hormone replacement therapy , Disp: , Rfl:      UNCLASSIFIED OTC PRODUCT MISC, Calcium AEP 1-2 capsules by mouth daily  Supports cell membranes* TID, Disp: , Rfl:      UNCLASSIFIED OTC PRODUCT MISC, RelaxaMag  1 capsule at bedtime as needed -  a unique magnesium supplement that helps improve sleep and stress tolerance in the evening, Disp: , Rfl:      valACYclovir (VALTREX) 500 MG tablet, Take 1 tablet (500 mg) by mouth 2 times daily For 3 days when flares occur (Patient taking differently: Take 500 mg by mouth every morning For 3 days when flares occur), Disp: 30 tablet, Rfl: 3     VITAMIN C 500 MG OR TABS, 2 tab daily AT BREAKFAST, Disp: , Rfl:   Allergies   Allergen Reactions     Dust Mite Extract      Hydrocodone Nausea and Vomiting     n/v     Meperidine Nausea and Vomiting     vomiting     Mold      Trees      PMH, SOC HIST, FAM HIST, PROBLEM LIST:  All reviewed in EPIC.    OBJECTIVE:  /67  Pulse 75  Temp 98.3  F (36.8  C) (Oral)  Resp 24  Ht 1.638 m (5' 4.5\")  Wt 57.2 kg (126 lb 1.6 oz)  SpO2 100%  Breastfeeding? No  BMI 21.31 kg/m2    Imaging:  None new.    (These are the pertinent findings from:  Xrays taken today. none  My impression: Overall alignment of the spine in 2 planes is satisfactory and unchanged from post op. Hardware is in good position without evidence failure.  Radiologist's report:  Please see Epic for the bulk of the report.  I personally reviewed the images with the patient)    EXAM:  Well developed well nourished female found seated comfortably in exam chair.  No apparent distress. She is accompanied.  A&O X3.  Mood and affect WNL. Language and fund of knowledge intact.  Is able to sit and rise independently.   She has a nicely healing incision.  I prepped the wound with chloroprep and solvent. No sutures to remove.  Wearing brace " appropriately.    Exam at discharge:   Hip Flexion  left     5  right     5   Knee Extension 5 5   Ankle Dorsiflexion 5 4   Extensor Hallucis Longus 5 3   Plantar Flexion 5 5   Foot eversion 5 5   Foot inversion 5 5       Exam today:  Hip Flexion  left     5  right     5   Knee Extension 5 5   Ankle Dorsiflexion 5 4+   Extensor Hallucis Longus 5 3+   Plantar Flexion 5 5   Foot eversion 5 5   Foot inversion 5 5       Assessment/Plan:  1. Flatback syndrome of thoracolumbar region    2. Pseudoarthrosis of lumbar spine    3. Other osteoporosis without current pathological fracture    4. Vitamin D deficiency    5. Post-operative state      Marcia Kauffman is doing well after her large lumbar reconstruction for flat back. She was quite nervous about going home after surgery more abruptly than she anticipated, and felt she deserved to be in a TCU, or some kind of care center. In reality she has done extremely well, but we spent an awful long time today talking about how she felt as though she should have had some more help, instruction, or care upon discharge.   We discussed wound care.  *  May shower and shampoo with mild soap/shampoo including the incision. *  May swim or bathe when all scabbing is gone from the incision.  We discussed medication.    *  FYI: In general we prescribe narcotics for pain management in the post operative recovery phase generally 2-6 weeks post op, depending on the surgery performed.  *  Medications prescribed today:  Oxycodone, methocarbamol,Compazine E-prescribed and/or Printed today and handed to the patient. I believe she is a little underdosed on her pain meds, particularly at night.. I recommend she try 1-1/2 pain pills at night for sleep and if that is not helpful then consider speaking to her primary about a sleeping medication.   *  Continue to avoid NSAIDs until 3 months post op.  We discussed activities and return to work.  *  We recommend she continue the current activity  restrictions until she returns for the next visit. We do not recommend formal physical therapy or exercising until she is 6 weeks postop. She may do light stretching so long as it does not Hyperflex or extend her lumbar spine.  *   She can return to driving if: she is off narcotic    We discussed follow up   *  Return to clinic in 4 weeks.  Imaging for that visit: Plain XR and 25 OH.  10 weeks with Dr Beckford and plain XR.   *  All the patient's questions have been answered and they demonstrate good understanding of the above.    *   The patient has our contact information and is aware that she should call if she has questions comments or concerns.     We appreciate the opportunity to be of service in the care of this pleasant patient.  Please do call if there is anything more we can do    Holli Ny PA-C  [facility]  Department of Neurosurgery  Phone: [phone]  Fax: [phone]    I spent 40 minutes of a 30 minute visit with this patient today discussing  pine pathology, postoperative care, expectations, and managing plans.  This note was generated using voice recognition software. While edited for content some inaccurate phrasing may be found.    Again, thank you for allowing me to participate in the care of your patient.      Sincerely,    Holli Ny PA-C

## 2018-05-31 ENCOUNTER — TELEPHONE (OUTPATIENT)
Dept: NEUROSURGERY | Facility: CLINIC | Age: 68
End: 2018-05-31

## 2018-05-31 DIAGNOSIS — M40.30 FLAT BACK SYNDROME: Primary | ICD-10-CM

## 2018-05-31 NOTE — TELEPHONE ENCOUNTER
Writer reached out to the pt regarding home pt orders.  Writer wyman when she told the pt that she needed to wait 6 weeks before starting physical therapy.  This was not correct.  Pt is to start physical therapy right away.  Writer spoke with [facility] and they need new orders.  Writer will enter those and fax them to [facility].

## 2018-06-04 ENCOUNTER — TELEPHONE (OUTPATIENT)
Dept: NEUROSURGERY | Facility: CLINIC | Age: 68
End: 2018-06-04

## 2018-06-06 ENCOUNTER — TELEPHONE (OUTPATIENT)
Dept: NEUROSURGERY | Facility: CLINIC | Age: 68
End: 2018-06-06

## 2018-06-06 NOTE — TELEPHONE ENCOUNTER
Cristina called [facility].  A verbal ok was given for PT twice a week for 3 weeks.  In addition, Cristina asked of OT could evaluate pt.  Pt would like to do more for herself.  Given her restrictions, OT can help her develop strategies.

## 2018-06-20 ENCOUNTER — RADIANT APPOINTMENT (OUTPATIENT)
Dept: GENERAL RADIOLOGY | Facility: CLINIC | Age: 68
End: 2018-06-20
Attending: PHYSICIAN ASSISTANT
Payer: COMMERCIAL

## 2018-06-20 DIAGNOSIS — Z98.890 POST-OPERATIVE STATE: ICD-10-CM

## 2018-06-20 DIAGNOSIS — E55.9 VITAMIN D DEFICIENCY: ICD-10-CM

## 2018-06-20 DIAGNOSIS — M40.35 FLATBACK SYNDROME OF THORACOLUMBAR REGION: ICD-10-CM

## 2018-06-20 DIAGNOSIS — M48.062 LUMBAR STENOSIS WITH NEUROGENIC CLAUDICATION: ICD-10-CM

## 2018-06-20 DIAGNOSIS — S32.009K PSEUDOARTHROSIS OF LUMBAR SPINE: ICD-10-CM

## 2018-06-20 DIAGNOSIS — M81.8 OTHER OSTEOPOROSIS WITHOUT CURRENT PATHOLOGICAL FRACTURE: ICD-10-CM

## 2018-06-20 LAB
ALBUMIN UR-MCNC: NEGATIVE MG/DL
ANION GAP SERPL CALCULATED.3IONS-SCNC: 8 MMOL/L (ref 3–14)
APPEARANCE UR: CLEAR
APTT PPP: 29 SEC (ref 22–37)
BASOPHILS # BLD AUTO: 0 10E9/L (ref 0–0.2)
BASOPHILS NFR BLD AUTO: 0.5 %
BILIRUB UR QL STRIP: NEGATIVE
BUN SERPL-MCNC: 20 MG/DL (ref 7–30)
CALCIUM SERPL-MCNC: 9.4 MG/DL (ref 8.5–10.1)
CHLORIDE SERPL-SCNC: 102 MMOL/L (ref 94–109)
CO2 SERPL-SCNC: 29 MMOL/L (ref 20–32)
COLOR UR AUTO: YELLOW
CREAT SERPL-MCNC: 0.77 MG/DL (ref 0.52–1.04)
DIFFERENTIAL METHOD BLD: ABNORMAL
EOSINOPHIL # BLD AUTO: 0 10E9/L (ref 0–0.7)
EOSINOPHIL NFR BLD AUTO: 0.4 %
ERYTHROCYTE [DISTWIDTH] IN BLOOD BY AUTOMATED COUNT: 14.3 % (ref 10–15)
GFR SERPL CREATININE-BSD FRML MDRD: 74 ML/MIN/1.7M2
GLUCOSE SERPL-MCNC: 97 MG/DL (ref 70–99)
GLUCOSE UR STRIP-MCNC: NEGATIVE MG/DL
HCT VFR BLD AUTO: 35.8 % (ref 35–47)
HGB BLD-MCNC: 11.7 G/DL (ref 11.7–15.7)
HGB UR QL STRIP: ABNORMAL
IMM GRANULOCYTES # BLD: 0 10E9/L (ref 0–0.4)
IMM GRANULOCYTES NFR BLD: 0.4 %
INR PPP: 0.95 (ref 0.86–1.14)
KETONES UR STRIP-MCNC: NEGATIVE MG/DL
LEUKOCYTE ESTERASE UR QL STRIP: NEGATIVE
LYMPHOCYTES # BLD AUTO: 1.6 10E9/L (ref 0.8–5.3)
LYMPHOCYTES NFR BLD AUTO: 20.3 %
MCH RBC QN AUTO: 32.4 PG (ref 26.5–33)
MCHC RBC AUTO-ENTMCNC: 32.7 G/DL (ref 31.5–36.5)
MCV RBC AUTO: 99 FL (ref 78–100)
MONOCYTES # BLD AUTO: 0.5 10E9/L (ref 0–1.3)
MONOCYTES NFR BLD AUTO: 7 %
NEUTROPHILS # BLD AUTO: 5.5 10E9/L (ref 1.6–8.3)
NEUTROPHILS NFR BLD AUTO: 71.4 %
NITRATE UR QL: NEGATIVE
NRBC # BLD AUTO: 0 10*3/UL
NRBC BLD AUTO-RTO: 0 /100
PH UR STRIP: 7 PH (ref 5–7)
PLATELET # BLD AUTO: 301 10E9/L (ref 150–450)
POTASSIUM SERPL-SCNC: 4.2 MMOL/L (ref 3.4–5.3)
RBC # BLD AUTO: 3.61 10E12/L (ref 3.8–5.2)
RBC #/AREA URNS AUTO: 8 /HPF (ref 0–2)
SODIUM SERPL-SCNC: 139 MMOL/L (ref 133–144)
SOURCE: ABNORMAL
SP GR UR STRIP: 1.01 (ref 1–1.03)
UROBILINOGEN UR STRIP-MCNC: 0 MG/DL (ref 0–2)
WBC # BLD AUTO: 7.7 10E9/L (ref 4–11)
WBC #/AREA URNS AUTO: <1 /HPF (ref 0–5)

## 2018-06-22 LAB
DEPRECATED CALCIDIOL+CALCIFEROL SERPL-MC: <62 UG/L (ref 20–75)
VITAMIN D2 SERPL-MCNC: <5 UG/L
VITAMIN D3 SERPL-MCNC: 57 UG/L

## 2018-06-25 ENCOUNTER — OFFICE VISIT (OUTPATIENT)
Dept: NEUROSURGERY | Facility: CLINIC | Age: 68
End: 2018-06-25
Payer: COMMERCIAL

## 2018-06-25 VITALS
SYSTOLIC BLOOD PRESSURE: 123 MMHG | HEART RATE: 94 BPM | WEIGHT: 126.32 LBS | HEIGHT: 65 IN | DIASTOLIC BLOOD PRESSURE: 57 MMHG | BODY MASS INDEX: 21.05 KG/M2

## 2018-06-25 DIAGNOSIS — E55.9 VITAMIN D DEFICIENCY: ICD-10-CM

## 2018-06-25 DIAGNOSIS — S32.009K PSEUDOARTHROSIS OF LUMBAR SPINE: ICD-10-CM

## 2018-06-25 DIAGNOSIS — Z98.890 POST-OPERATIVE STATE: ICD-10-CM

## 2018-06-25 DIAGNOSIS — M40.30 FLAT BACK SYNDROME: Primary | ICD-10-CM

## 2018-06-25 ASSESSMENT — PAIN SCALES - GENERAL: PAINLEVEL: MODERATE PAIN (5)

## 2018-06-25 NOTE — LETTER
6/25/2018       RE: Marcia Kauffman  [id number]  [facility] [address]     Dear Colleague,    Thank you for referring your patient, Marcia Kauffman, to the [facility] NEUROSURGERY at [facility] acute hospital. Please see a copy of my visit note below.      Neurosurgery clinic post op wound check  Date of visit: 5/29/2018      Procedure:  05/14/2018  SAGAR Rowe   DIAGNOSES:     1.  Flat back syndrome.    2.  Previous L4-L5 posterior decompression and posterior lateral instrumented fusion.   3.  Osteoporosis.       PROCEDURE PERFORMED:   1.  Stealth-guided L2 through sacrum posterior lateral instrumented fusion with local harvest autograft and allograft.   2.  Removal and replacement of bilateral L4-L5 instrumentation.   3.  Pelvic fixation with S2 alar iliac screws.   4.  Bilateral transforaminal lumbar interbody fusion L2-L3, L3-L4, L4-L5.   5.  Bilateral Oshea-Limon osteotomies for deformity correction, bilateral L2-L3, L3-L4 and L4-L5.     INDICATIONS FOR SURGERY:  Marcia Kauffman is a 67-year-old woman who presented with a prior posterior lateral instrumented fusion at L4-L5 and transitional segment anatomy with fused disk spaces at L5-S1 and rudimentary S1-S2 disc space.  She had a pelvic incidence of 62 degrees and a lumbar lordosis of 36 degrees for significant pelvic incidence to lumbar lordosis mismatch.  The patient had symptomatic standing and ambulation intolerance with severe back pain.  She was counseled as to risks versus benefits of revision posterior fusion with potential extension up into the thoracic spine versus potential for correction being obtained solely in the lumbar spine.  After a full discussion of risks versus benefits, she elected to proceed.  Of note, she was diagnosed with osteoporosis and was maintained on teriparatide for several months prior to proceeding with surgery in order to improve her bone quality.       A 2 surgeon  approach was used to minimize operative blood loss and operative time given the complex deformity correction required.   HPI:  Inpatient:   Following surgery the patient was monitored on the general Neurosurgical floor where she did well and remained medically and neurologically stable.   Ace was removed post operative day #1 and was able to void independently.    Patient was evaluated by PT and OT who felt she was safe to discharge home with family and outpatient PT.    Due to poor bone quality the patient was fitted in a Adalberto brace post operatively.  This will need to be worn when head of bed is greater than 30 degrees and was continued on home Forteo dose post operatively.   Post operative Lumbar X-rays revealed proper positioning of surgical hardware.    Patient stated pain was well controlled post operatively, noting intermittent left anterior thigh numbness.  [place] brace was uncomfortable and this was adjusted by Orthotics prior to discharge.   Patient was followed by nutrition services during stay, there plan of care as listed below:   MALNUTRITION (Limited d/t nursing care at the bedside)  % Intake: No decreased intake noted  % Weight Loss: None noted  Subcutaneous Fat Loss: Facial region and Upper arm: Mild  Muscle Loss: Temporal and Upper arm (bicep, tricep): Mild  Fluid Accumulation/Edema: Does not meet criteria (per chart review)  Signs and symptoms:  Mild subcutaneous fat loss, mild muscle loss  Treatment:  Nutrition consult, nutrition education, discussed utilizing nutritional supplements to help meet goal of 1.5 g protein per day, medical food supplement therapy, Vitamin C 1000 mg daily, Thera-Vit-M daily, Order calorie counts  Prior to discharge patient was voiding, passing bowel movements, tolerating diet, pain was controlled, hemoglobin  acceptable @ 9.9, surgical drain was removed, patient was ambulating independently and safely.  Patient was medically and neurologically stable at  discharge  Outpatient:   She is now 6 weeks post op.     Since discharge she has weaned herself down on her pain meds quite a lot.  She is not sleeping well at night though. She was quite nervous about going home without any physical therapy or home care, she was quite nervous about going directly home and not to a TCU. She had many concerns about this and talked about it quite extensively. She felt she qualified under Medicare guidelines to receive home care and wanted to know why she did not have it prescribed, or at least didn't talk to a  about it.  She presents for routine wound check. Her weight was checked today and has remained stable. She reports she is eating adequate amounts of protein.    STATUS REPORT  WORK:         Is not back at work.  Retired  ACTIVITY:     Has been following activity restrictions.  Now wants to start:   PT and exercising  MEDS:                  Pain            Tylenol during the day, oxycodone at night only, Robaxin during the day         NSAIDS    no  NICOTINE:  no   25 OH Total:    Lab         25 OH Vit D2 <5   ug/L Final 06/20/2018 10:11 AM 51   25 OH Vit D3 57   ug/L Final 06/20/2018 10:11 AM 51   25 OH Vit D total <62  20 - 75 ug/L Final 06/20/2018 10:11 AM          Patient Supplied Answers To the [facility] Pain Questionnaire  [facility] Pain -  Patient Entered Questionnaire/Answers 6/19/2018   What number best describes your pain right now:  0 = No pain  to  10 = Worst pain imaginable 6   How would you describe the pain? numbness, dull, aching   Which of the following worsen your pain? lying down, standing, sitting   Which of the following improve or reduce your pain?  lying down, walking, exercise, medication, relaxation   What number best describes your average pain for the past week:  0 = No pain  to  10 = Worst pain imaginable 5   What number best describes your LOWEST pain in past 24 hours:  0 = No pain  to  10 = Worst pain imaginable 4   What number best describes your  WORST pain in past 24 hours:  0 = No pain  to  10 = Worst pain imaginable 8   When is your pain worst? -   What non-medicine treatments have you already had for your pain? physical therapy, chiropractic care, TENS (electrical stimulator), surgery, exercise   Have you tried treating your pain with medication?  Yes   Are you currently taking medications for your pain? Yes            Current Outpatient Prescriptions:      Coenzyme Q10 (CO Q 10 PO), Take 1 chew tab by mouth every morning , Disp: , Rfl:      DAILY MULTI VITAMIN/MINERALS OR, 1 TABLET DAILY AT BREAKFAST ON HOLD FOR SURGERY SINCE 05/01/2018, Disp: , Rfl:      FLAX SEED OIL OR, takes in her cereal. STIOPPED 05/01/2018, Disp: , Rfl:      GLUCOSAMINE CHONDR 500 COMPLEX OR CAPS, 1 capsule daily at noon, Disp: , Rfl:      magnesium citrate solution, Take 296 mLs by mouth At Bedtime, Disp: , Rfl:      methocarbamol (ROBAXIN) 500 MG tablet, Take 1 tablet (500 mg) by mouth 4 times daily as needed for muscle spasms, Disp: 45 tablet, Rfl: 0     montelukast (SINGULAIR) 10 MG tablet, Take 1 tablet (10 mg) by mouth daily (Patient taking differently: Take 10 mg by mouth daily as needed ), Disp: 30 tablet, Rfl: 11     Omega-3 Fatty Acids (OMEGA-3 FISH OIL) 1200 MG CAPS, Take 1,200 mg by mouth 2 times daily (with meals) ON HOLD FOR SURGERY SINCE 05/01/2018, Disp: , Rfl:      polyethylene glycol (MIRALAX/GLYCOLAX) Packet, Take 17 g by mouth 3 times daily, Disp: 7 packet, Rfl: 1     Probiotic Product (PROBIOTIC PO), Take by mouth every other day, Disp: , Rfl:      senna-docusate (SENOKOT-S;PERICOLACE) 8.6-50 MG per tablet, Take 2 tablets by mouth 2 times daily, Disp: 30 tablet, Rfl: 0     teriparatide, recombinant, (FORTEO) 600 MCG/2.4ML SOLN injection, Inject Subcutaneous every morning, Disp: , Rfl:      UNABLE TO FIND, Apply 0.25 tsp. topically 2 times daily MEDICATION NAME: Bio- estrol, plant based hormone replacement therapy , Disp: , Rfl:      UNCLASSIFIED OTC PRODUCT  "MISC, Calcium AEP 1-2 capsules by mouth daily  Supports cell membranes* TID, Disp: , Rfl:      UNCLASSIFIED OTC PRODUCT MISC, RelaxaMag  1 capsule at bedtime as needed -  a unique magnesium supplement that helps improve sleep and stress tolerance in the evening, Disp: , Rfl:      valACYclovir (VALTREX) 500 MG tablet, Take 1 tablet (500 mg) by mouth 2 times daily For 3 days when flares occur (Patient taking differently: Take 500 mg by mouth every morning For 3 days when flares occur), Disp: 30 tablet, Rfl: 3     VITAMIN C 500 MG OR TABS, 2 tab daily AT BREAKFAST, Disp: , Rfl:   Allergies   Allergen Reactions     [place] Extract      Hydrocodone Nausea and Vomiting     n/v     Meperidine Nausea and Vomiting     vomiting     Mold      Trees      PMH, SOC HIST, FAM HIST, PROBLEM LIST:  All reviewed in EPIC.    OBJECTIVE:  /57  Pulse 94  Ht 1.657 m (5' 5.25\")  Wt 57.3 kg (126 lb 5.2 oz)  BMI 20.86 kg/m2    Imaging:  These are the pertinent findings from:  Xrays taken today. 6/25/18  My impression: Overall alignment of the spine in 2 planes is satisfactory and unchanged from post op. Hardware is in good position without evidence failure.  Radiologist's report:  IMPRESSION: Stable postsurgical change of L2 to pelvis fusion  instrumentation with moderate adjacent level degenerative disc change  Please see Epic for the bulk of the report.  I personally reviewed the images with the patient    EXAM:  Well developed well nourished female found seated comfortably in exam chair.  No apparent distress. She is accompanied.  A&O X3.  Mood and affect WNL. Language and fund of knowledge intact.  Is able to sit and rise independently.   She has a nicely healed incision. .  Wearing brace appropriately.    Exam at discharge:   Hip Flexion  left     5  right     5   Knee Extension 5 5   Ankle Dorsiflexion 5 4   Extensor Hallucis Longus 5 3   Plantar Flexion 5 5   Foot eversion 5 5   Foot inversion 5 5       Exam today:  Hip " Flexion  left     5  right     5   Knee Extension 5 5   Ankle Dorsiflexion 5 5   Extensor Hallucis Longus 5 5   Plantar Flexion 5 5   Foot eversion 5 5   Foot inversion 5 5       Assessment/Plan:  1. Flat back syndrome    2. Post-operative state    3. Pseudoarthrosis of lumbar spine    4. Vitamin D deficiency      Marcia Kauffman is doing well after her large lumbar reconstruction for flat back.  Her leg strength is greatly improved, her pain is still present.  She weaned herself off of all of her pain meds very early on in the process.  Pain keeps her awake at night so we discussed perhaps she should try taking a half a pain pill before bed.Once again I believe she is under-dosing her meds.  We had this discussion at our last visit too.     We discussed medication.    *  FYI: In general we prescribe narcotics for pain management in the post operative recovery phase generally 2-6 weeks post op, depending on the surgery performed.  *  Medications prescribed today:  None   *  Continue to avoid NSAIDs until 3 months post op.  *   Her vitamin D level is good.  She can cut her over-the-counter dosing by one third.  We discussed activities and return to work.  *  We recommend she continue the current activity restrictions until she returns for the next visit.  She may do light stretching so long as it does not Hyperflex or extend her lumbar spine.  *   She can return to driving if: she is off narcotic    We discussed follow up   *  Return to clinic in 10 weeks with Dr Beckford and boris CASTRO.   *  All the patient's questions have been answered and they demonstrate good understanding of the above.    *   The patient has our contact information and is aware that she should call if she has questions comments or concerns.     We appreciate the opportunity to be of service in the care of this pleasant patient.  Please do call if there is anything more we can do    Holli Ny PA-C  [facility]  Department of  Neurosurgery  Phone: [phone]  Fax: [phone]    This note was generated using voice recognition software. While edited for content some inaccurate phrasing may be found.

## 2018-06-25 NOTE — MR AVS SNAPSHOT
After Visit Summary   6/25/2018    Marcia Kauffman    MRN: [id number]           Patient Information     Date Of Birth          1950        Visit Information        Provider Department      6/25/2018 1:00 PM Holli Ny PA-C [facility] Neurosurgery        Today's Diagnoses     Flat back syndrome    -  1    Post-operative state        Pseudoarthrosis of lumbar spine        Vitamin D deficiency           Follow-ups after your visit        Your next 10 appointments already scheduled     Aug 16, 2018 11:15 AM CDT   XR CHEST 2 VIEWS with UCXR1   [facility] ([facility])    [address] [id number] [phone]           Please bring a list of your current medicines to your exam. (Include vitamins, minerals and over-thecounter medicines.) Leave your valuables at home.  Tell your doctor if there is a chance you may be pregnant.  You do not need to do anything special for this exam.            Aug 16, 2018 11:45 AM CDT   (Arrive by 11:30 AM)   Return Visit with Nanette Beckford MD   [facility] Neurosurgery ([facility])    [address] [place] [id number] [phone]              Who to contact     Please call your clinic at [phone] to:    Ask questions about your health    Make or cancel appointments    Discuss your medicines    Learn about your test results    Speak to your doctor            Additional Information About Your Visit        "[employer]hart Information     [employer] gives you secure access to your electronic health record. If you see a primary care provider, you can also send messages to your care team and make appointments. If you have questions, please call your primary care clinic.  If you do not have a primary care provider, please call [phone] and they will assist you.      [employer] is an electronic gateway that provides easy, online access to  "your medical records. With [employer], you can request a clinic appointment, read your test results, renew a prescription or communicate with your care team.     To access your existing account, please contact your [facility] or call [phone] for assistance.        Care EveryWhere ID     This is your Care EveryWhere ID. This could be used by other organizations to access your [facility] medical records  [id number]        Your Vitals Were     Pulse Height BMI (Body Mass Index)             94 1.657 m (5' 5.25\") 20.86 kg/m2          Blood Pressure from Last 3 Encounters:   06/25/18 123/57   05/29/18 113/67   05/17/18 108/55    Weight from Last 3 Encounters:   06/25/18 57.3 kg (126 lb 5.2 oz)   05/29/18 57.2 kg (126 lb 1.6 oz)   05/14/18 57 kg (125 lb 10.6 oz)              Today, you had the following     No orders found for display         Today's Medication Changes          These changes are accurate as of 6/25/18  1:44 PM.  If you have any questions, ask your nurse or doctor.               These medicines have changed or have updated prescriptions.        Dose/Directions    montelukast 10 MG tablet   Commonly known as:  SINGULAIR   This may have changed:    - when to take this  - reasons to take this   Used for:  Allergic urticaria        Dose:  10 mg   Take 1 tablet (10 mg) by mouth daily   Quantity:  30 tablet   Refills:  11       valACYclovir 500 MG tablet   Commonly known as:  VALTREX   This may have changed:    - when to take this  - additional instructions   Used for:  Herpes simplex virus (HSV) infection        Dose:  500 mg   Take 1 tablet (500 mg) by mouth 2 times daily For 3 days when flares occur   Quantity:  30 tablet   Refills:  3                Primary Care Provider Office Phone # Fax #    Zhane Malgorzata Yost [phone]       [address] [address]        Equal Access to Services     HIPOLITO HAWK AH: Marisel ibrahim " amor Moore, wastephanieda luqadaha, qaybta kakyle wray, dora micahin hayaan jessiecesar hurley lamarsmaribel adri. So [facility] [phone].    ATENCIÓN: Si eanla micaela, tiene a montez disposición servicios gratuitos de asistencia lingüística. Geovanna al [phone].    We comply with applicable federal civil rights laws and [place] laws. We do not discriminate on the basis of race, color, national origin, age, disability, sex, sexual orientation, or gender identity.            Thank you!     Thank you for choosing [facility] NEUROSURGERY  for your care. Our goal is always to provide you with excellent care. Hearing back from our patients is one way we can continue to improve our services. Please take a few minutes to complete the written survey that you may receive in the mail after your visit with us. Thank you!             Your Updated Medication List - Protect others around you: Learn how to safely use, store and throw away your medicines at www.disposemymeds.org.          This list is accurate as of 6/25/18  1:44 PM.  Always use your most recent med list.                   Brand Name Dispense Instructions for use Diagnosis    ascorbic acid 500 MG tablet    VITAMIN C     2 tab daily AT BREAKFAST        CO Q 10 PO      Take 1 chew tab by mouth every morning        DAILY MULTI VITAMIN/MINERALS PO      1 TABLET DAILY AT BREAKFAST ON HOLD FOR SURGERY SINCE 05/01/2018        FLAX SEED OIL PO      takes in her cereal. STIOPPED 05/01/2018        FORTEO 600 MCG/2.4ML Soln injection   Generic drug:  teriparatide (recombinant)      Inject Subcutaneous every morning        glucosamine chondroitin 500 complex Caps      1 capsule daily at noon        magnesium citrate solution      Take 296 mLs by mouth At Bedtime        methocarbamol 500 MG tablet    ROBAXIN    45 tablet    Take 1 tablet (500 mg) by mouth 4 times daily as needed for muscle spasms    S/P lumbar fusion       montelukast 10 MG tablet    SINGULAIR    30 tablet    Take 1 tablet  (10 mg) by mouth daily    Allergic urticaria       Omega-3 Fish Oil 1200 MG Caps      Take 1,200 mg by mouth 2 times daily (with meals) ON HOLD FOR SURGERY SINCE 05/01/2018        polyethylene glycol Packet    MIRALAX/GLYCOLAX    7 packet    Take 17 g by mouth 3 times daily    S/P lumbar fusion       PROBIOTIC PO      Take by mouth every other day        senna-docusate 8.6-50 MG per tablet    SENOKOT-S;PERICOLACE    30 tablet    Take 2 tablets by mouth 2 times daily    S/P lumbar fusion       UNABLE TO FIND      Apply 0.25 tsp. topically 2 times daily MEDICATION NAME: Bio- estrol, plant based hormone replacement therapy        * UNCLASSIFIED OTC PRODUCT Misc      Calcium AEP 1-2 capsules by mouth daily ?Supports cell membranes* TID        * UNCLASSIFIED OTC PRODUCT Misc      RelaxaMag? 1 capsule at bedtime as needed -  a unique magnesium supplement that helps improve sleep and stress tolerance in the evening        valACYclovir 500 MG tablet    VALTREX    30 tablet    Take 1 tablet (500 mg) by mouth 2 times daily For 3 days when flares occur    Herpes simplex virus (HSV) infection       * Notice:  This list has 2 medication(s) that are the same as other medications prescribed for you. Read the directions carefully, and ask your doctor or other care provider to review them with you.

## 2018-06-25 NOTE — PROGRESS NOTES
Neurosurgery clinic post op   Date of visit: 6/25/2018      Procedure:  05/14/2018  SAGAR Rowe   DIAGNOSES:     1.  Flat back syndrome.    2.  Previous L4-L5 posterior decompression and posterior lateral instrumented fusion.   3.  Osteoporosis.       PROCEDURE PERFORMED:   1.  Stealth-guided L2 through sacrum posterior lateral instrumented fusion with local harvest autograft and allograft.   2.  Removal and replacement of bilateral L4-L5 instrumentation.   3.  Pelvic fixation with S2 alar iliac screws.   4.  Bilateral transforaminal lumbar interbody fusion L2-L3, L3-L4, L4-L5.   5.  Bilateral Oshea-Limon osteotomies for deformity correction, bilateral L2-L3, L3-L4 and L4-L5.     INDICATIONS FOR SURGERY:  Marcia Kauffman is a 67-year-old woman who presented with a prior posterior lateral instrumented fusion at L4-L5 and transitional segment anatomy with fused disk spaces at L5-S1 and rudimentary S1-S2 disc space.  She had a pelvic incidence of 62 degrees and a lumbar lordosis of 36 degrees for significant pelvic incidence to lumbar lordosis mismatch.  The patient had symptomatic standing and ambulation intolerance with severe back pain.  She was counseled as to risks versus benefits of revision posterior fusion with potential extension up into the thoracic spine versus potential for correction being obtained solely in the lumbar spine.  After a full discussion of risks versus benefits, she elected to proceed.  Of note, she was diagnosed with osteoporosis and was maintained on teriparatide for several months prior to proceeding with surgery in order to improve her bone quality.       A 2 surgeon approach was used to minimize operative blood loss and operative time given the complex deformity correction required.   HPI:  Inpatient:   Following surgery the patient was monitored on the general Neurosurgical floor where she did well and remained medically and neurologically stable.   Ace was removed  post operative day #1 and was able to void independently.    Patient was evaluated by PT and OT who felt she was safe to discharge home with family and outpatient PT.    Due to poor bone quality the patient was fitted in a Adalberto brace post operatively.  This will need to be worn when head of bed is greater than 30 degrees and was continued on home Forteo dose post operatively.   Post operative Lumbar X-rays revealed proper positioning of surgical hardware.    Patient stated pain was well controlled post operatively, noting intermittent left anterior thigh numbness.  Gordon Adalberto brace was uncomfortable and this was adjusted by Orthotics prior to discharge.   Patient was followed by nutrition services during stay, there plan of care as listed below:   MALNUTRITION (Limited d/t nursing care at the bedside)  % Intake: No decreased intake noted  % Weight Loss: None noted  Subcutaneous Fat Loss: Facial region and Upper arm: Mild  Muscle Loss: Temporal and Upper arm (bicep, tricep): Mild  Fluid Accumulation/Edema: Does not meet criteria (per chart review)  Signs and symptoms:  Mild subcutaneous fat loss, mild muscle loss  Treatment:  Nutrition consult, nutrition education, discussed utilizing nutritional supplements to help meet goal of 1.5 g protein per day, medical food supplement therapy, Vitamin C 1000 mg daily, Thera-Vit-M daily, Order calorie counts  Prior to discharge patient was voiding, passing bowel movements, tolerating diet, pain was controlled, hemoglobin  acceptable @ 9.9, surgical drain was removed, patient was ambulating independently and safely.  Patient was medically and neurologically stable at discharge  Outpatient:   She is now 6 weeks post op.     Since discharge she has weaned herself down on her pain meds quite a lot.  She is not sleeping well at night though. She was quite nervous about going home without any physical therapy or home care, she was quite nervous about going directly home and not to  a TCU. She had many concerns about this and talked about it quite extensively. She felt she qualified under Medicare guidelines to receive home care and wanted to know why she did not have it prescribed, or at least didn't talk to a  about it.  She presents for routine wound check. Her weight was checked today and has remained stable. She reports she is eating adequate amounts of protein.    STATUS REPORT  WORK:         Is not back at work.  Retired  ACTIVITY:     Has been following activity restrictions.  Now wants to start:   PT and exercising  MEDS:                  Pain            Tylenol during the day, oxycodone at night only, Robaxin during the day         NSAIDS    no  NICOTINE:  no   25 OH Total:    Lab         25 OH Vit D2 <5   ug/L Final 06/20/2018 10:11 AM 51   25 OH Vit D3 57   ug/L Final 06/20/2018 10:11 AM 51   25 OH Vit D total <62  20 - 75 ug/L Final 06/20/2018 10:11 AM          Patient Supplied Answers To the [facility] Pain Questionnaire  [facility] Pain -  Patient Entered Questionnaire/Answers 6/19/2018   What number best describes your pain right now:  0 = No pain  to  10 = Worst pain imaginable 6   How would you describe the pain? numbness, dull, aching   Which of the following worsen your pain? lying down, standing, sitting   Which of the following improve or reduce your pain?  lying down, walking, exercise, medication, relaxation   What number best describes your average pain for the past week:  0 = No pain  to  10 = Worst pain imaginable 5   What number best describes your LOWEST pain in past 24 hours:  0 = No pain  to  10 = Worst pain imaginable 4   What number best describes your WORST pain in past 24 hours:  0 = No pain  to  10 = Worst pain imaginable 8   When is your pain worst? -   What non-medicine treatments have you already had for your pain? physical therapy, chiropractic care, TENS (electrical stimulator), surgery, exercise   Have you tried treating your pain with medication?  Yes    Are you currently taking medications for your pain? Yes            Current Outpatient Prescriptions:      Coenzyme Q10 (CO Q 10 PO), Take 1 chew tab by mouth every morning , Disp: , Rfl:      DAILY MULTI VITAMIN/MINERALS OR, 1 TABLET DAILY AT BREAKFAST ON HOLD FOR SURGERY SINCE 05/01/2018, Disp: , Rfl:      FLAX SEED OIL OR, takes in her cereal. STIOPPED 05/01/2018, Disp: , Rfl:      GLUCOSAMINE CHONDR 500 COMPLEX OR CAPS, 1 capsule daily at noon, Disp: , Rfl:      magnesium citrate solution, Take 296 mLs by mouth At Bedtime, Disp: , Rfl:      methocarbamol (ROBAXIN) 500 MG tablet, Take 1 tablet (500 mg) by mouth 4 times daily as needed for muscle spasms, Disp: 45 tablet, Rfl: 0     montelukast (SINGULAIR) 10 MG tablet, Take 1 tablet (10 mg) by mouth daily (Patient taking differently: Take 10 mg by mouth daily as needed ), Disp: 30 tablet, Rfl: 11     Omega-3 Fatty Acids (OMEGA-3 FISH OIL) 1200 MG CAPS, Take 1,200 mg by mouth 2 times daily (with meals) ON HOLD FOR SURGERY SINCE 05/01/2018, Disp: , Rfl:      polyethylene glycol (MIRALAX/GLYCOLAX) Packet, Take 17 g by mouth 3 times daily, Disp: 7 packet, Rfl: 1     Probiotic Product (PROBIOTIC PO), Take by mouth every other day, Disp: , Rfl:      senna-docusate (SENOKOT-S;PERICOLACE) 8.6-50 MG per tablet, Take 2 tablets by mouth 2 times daily, Disp: 30 tablet, Rfl: 0     teriparatide, recombinant, (FORTEO) 600 MCG/2.4ML SOLN injection, Inject Subcutaneous every morning, Disp: , Rfl:      UNABLE TO FIND, Apply 0.25 tsp. topically 2 times daily MEDICATION NAME: Bio- estrol, plant based hormone replacement therapy , Disp: , Rfl:      UNCLASSIFIED OTC PRODUCT MISC, Calcium AEP 1-2 capsules by mouth daily  Supports cell membranes* TID, Disp: , Rfl:      UNCLASSIFIED OTC PRODUCT MISC, RelaxaMag  1 capsule at bedtime as needed -  a unique magnesium supplement that helps improve sleep and stress tolerance in the evening, Disp: , Rfl:      valACYclovir (VALTREX) 500 MG  "tablet, Take 1 tablet (500 mg) by mouth 2 times daily For 3 days when flares occur (Patient taking differently: Take 500 mg by mouth every morning For 3 days when flares occur), Disp: 30 tablet, Rfl: 3     VITAMIN C 500 MG OR TABS, 2 tab daily AT BREAKFAST, Disp: , Rfl:   Allergies   Allergen Reactions     Dust Mite Extract      Hydrocodone Nausea and Vomiting     n/v     Meperidine Nausea and Vomiting     vomiting     Mold      Trees      PMH, SOC HIST, FAM HIST, PROBLEM LIST:  All reviewed in EPIC.    OBJECTIVE:  /57  Pulse 94  Ht 1.657 m (5' 5.25\")  Wt 57.3 kg (126 lb 5.2 oz)  BMI 20.86 kg/m2    Imaging:  These are the pertinent findings from:  Xrays taken today. 6/25/18  My impression: Overall alignment of the spine in 2 planes is satisfactory and unchanged from post op. Hardware is in good position without evidence failure.  Radiologist's report:  IMPRESSION: Stable postsurgical change of L2 to pelvis fusion  instrumentation with moderate adjacent level degenerative disc change  Please see Epic for the bulk of the report.  I personally reviewed the images with the patient    EXAM:  Well developed well nourished female found seated comfortably in exam chair.  No apparent distress. She is accompanied.  A&O X3.  Mood and affect WNL. Language and fund of knowledge intact.  Is able to sit and rise independently.   She has a nicely healed incision. .  Wearing brace appropriately.    Exam at discharge:   Hip Flexion  left     5  right     5   Knee Extension 5 5   Ankle Dorsiflexion 5 4   Extensor Hallucis Longus 5 3   Plantar Flexion 5 5   Foot eversion 5 5   Foot inversion 5 5       Exam today:  Hip Flexion  left     5  right     5   Knee Extension 5 5   Ankle Dorsiflexion 5 5   Extensor Hallucis Longus 5 5   Plantar Flexion 5 5   Foot eversion 5 5   Foot inversion 5 5       Assessment/Plan:  1. Flat back syndrome    2. Post-operative state    3. Pseudoarthrosis of lumbar spine    4. Vitamin D deficiency  "     Marcia Kauffman is doing well after her large lumbar reconstruction for flat back.  Her leg strength is greatly improved, her pain is still present.  She weaned herself off of all of her pain meds very early on in the process.  Pain keeps her awake at night so we discussed perhaps she should try taking a half a pain pill before bed.Once again I believe she is under-dosing her meds.  We had this discussion at our last visit too.     We discussed medication.    *  FYI: In general we prescribe narcotics for pain management in the post operative recovery phase generally 2-6 weeks post op, depending on the surgery performed.  *  Medications prescribed today:  None   *  Continue to avoid NSAIDs until 3 months post op.  *   Her vitamin D level is good.  She can cut her over-the-counter dosing by one third.  We discussed activities and return to work.  *  We recommend she continue the current activity restrictions until she returns for the next visit.  She may do light stretching so long as it does not Hyperflex or extend her lumbar spine.  *   She can return to driving if: she is off narcotic    We discussed follow up   *  Return to clinic in 10 weeks with Dr Beckford and plain XR.   *  All the patient's questions have been answered and they demonstrate good understanding of the above.    *   The patient has our contact information and is aware that she should call if she has questions comments or concerns.     We appreciate the opportunity to be of service in the care of this pleasant patient.  Please do call if there is anything more we can do    Holli Ny PA-C  [facility]  Department of Neurosurgery  Phone: [phone]  Fax: [phone]    This note was generated using voice recognition software. While edited for content some inaccurate phrasing may be found.

## 2018-07-30 ENCOUNTER — TELEPHONE (OUTPATIENT)
Dept: NEUROSURGERY | Facility: CLINIC | Age: 68
End: 2018-07-30

## 2018-07-30 NOTE — TELEPHONE ENCOUNTER
Pt stated that her PCP would not refill her Forteo prescription.  In Dr. Beckford' note it stated that the pt needed to be on it for 24 months.  A In Basket was sent to Holli Ny on how to handle this situation.

## 2018-08-01 DIAGNOSIS — M81.0 OSTEOPOROSIS: Primary | ICD-10-CM

## 2018-08-01 NOTE — TELEPHONE ENCOUNTER
Dr. Beckford approved of a referral to Dr. Rogers for this pt.  Writer called the pt to let her know about the referral and told Marcia to call if she has not heard from them is a weeks time.  Also, let the writer know if she cannot get it before the end of August (pt has enough Forteo to last until the end of August).

## 2018-08-10 DIAGNOSIS — M41.9 SCOLIOSIS: Primary | ICD-10-CM

## 2018-08-16 ENCOUNTER — PATIENT OUTREACH (OUTPATIENT)
Dept: CARE COORDINATION | Facility: CLINIC | Age: 68
End: 2018-08-16

## 2018-08-16 ENCOUNTER — OFFICE VISIT (OUTPATIENT)
Dept: NEUROSURGERY | Facility: CLINIC | Age: 68
End: 2018-08-16
Payer: COMMERCIAL

## 2018-08-16 ENCOUNTER — RADIANT APPOINTMENT (OUTPATIENT)
Dept: GENERAL RADIOLOGY | Facility: CLINIC | Age: 68
End: 2018-08-16
Attending: STUDENT IN AN ORGANIZED HEALTH CARE EDUCATION/TRAINING PROGRAM
Payer: COMMERCIAL

## 2018-08-16 ENCOUNTER — RADIANT APPOINTMENT (OUTPATIENT)
Dept: GENERAL RADIOLOGY | Facility: CLINIC | Age: 68
End: 2018-08-16
Attending: NEUROLOGICAL SURGERY
Payer: COMMERCIAL

## 2018-08-16 VITALS
WEIGHT: 124.9 LBS | HEART RATE: 59 BPM | DIASTOLIC BLOOD PRESSURE: 39 MMHG | TEMPERATURE: 96.2 F | BODY MASS INDEX: 20.63 KG/M2 | SYSTOLIC BLOOD PRESSURE: 102 MMHG

## 2018-08-16 DIAGNOSIS — M48.062 LUMBAR STENOSIS WITH NEUROGENIC CLAUDICATION: ICD-10-CM

## 2018-08-16 DIAGNOSIS — M41.9 SCOLIOSIS: ICD-10-CM

## 2018-08-16 DIAGNOSIS — M70.62 TROCHANTERIC BURSITIS, LEFT HIP: ICD-10-CM

## 2018-08-16 DIAGNOSIS — E55.9 VITAMIN D DEFICIENCY: ICD-10-CM

## 2018-08-16 DIAGNOSIS — R52 PAIN: ICD-10-CM

## 2018-08-16 DIAGNOSIS — M21.70 LEG LENGTH DISCREPANCY: ICD-10-CM

## 2018-08-16 DIAGNOSIS — M40.36 FLATBACK SYNDROME OF LUMBAR REGION: Primary | ICD-10-CM

## 2018-08-16 ASSESSMENT — ANXIETY QUESTIONNAIRES
6. BECOMING EASILY ANNOYED OR IRRITABLE: SEVERAL DAYS
1. FEELING NERVOUS, ANXIOUS, OR ON EDGE: NEARLY EVERY DAY
3. WORRYING TOO MUCH ABOUT DIFFERENT THINGS: SEVERAL DAYS
IF YOU CHECKED OFF ANY PROBLEMS ON THIS QUESTIONNAIRE, HOW DIFFICULT HAVE THESE PROBLEMS MADE IT FOR YOU TO DO YOUR WORK, TAKE CARE OF THINGS AT HOME, OR GET ALONG WITH OTHER PEOPLE: SOMEWHAT DIFFICULT
2. NOT BEING ABLE TO STOP OR CONTROL WORRYING: NOT AT ALL
7. FEELING AFRAID AS IF SOMETHING AWFUL MIGHT HAPPEN: NOT AT ALL

## 2018-08-16 ASSESSMENT — PATIENT HEALTH QUESTIONNAIRE - PHQ9: 5. POOR APPETITE OR OVEREATING: SEVERAL DAYS

## 2018-08-16 ASSESSMENT — PAIN SCALES - GENERAL: PAINLEVEL: MODERATE PAIN (4)

## 2018-08-16 NOTE — PROGRESS NOTES
It was a pleasure to evaluate Marcia Kauffman in scheduled postop followup 3 months s/p complex spinal deformity correction surgery on 5/14/18 by Dr. Beckford and Dr. Aldana for:    Lumbar 2 To Sacral 1 Posterior Instrumented Fusion, Pelvic Fixation, Revision Of Lumbar 4-5 Instrumentation, Transforaminal Interbody Fusion with bone morphogenic protein, Oshea-Limon Osteotomies,  Lumbar 2-3, 3-4, 4-5      Marcia feels she is standing much more upright and is pleased  She is on Forteo for osteoporosis  Her preop left inguinal nerve pain and left trochanteric bursitis are agitated  She is walking 3 miles per day  Strength is 5/5 BLE, incision well healed, xrays with good alignment and stable instrumentation    PLAN-  Ok to discontinue brace now  Left trochanteric bursa injection- if no effect we can consider left L5-S1 TFESI for potential radicular irritation down left lateral leg/calf  Orthotics RX for leg length discrepancy  followup in 3 months with Holli Ny PA-C with total spine xrays and vitamin D level  Continue Forteo for minimum of one year postop, recommend completing entire 2 years course

## 2018-08-16 NOTE — PATIENT INSTRUCTIONS
Follow up with [facility] for get orthotics for your shoes.  An order has been put in for you to get a trochanter bursa injection.  Follow up with Holli Ny in three months with a total spine x-ray and a vitamin D level           drawn before the appointment.

## 2018-08-16 NOTE — MR AVS SNAPSHOT
After Visit Summary   8/16/2018    Marcia Kauffman    MRN: [id number]           Patient Information     Date Of Birth          1950        Visit Information        Provider Department      8/16/2018 11:45 AM Nanette Beckford MD [facility] Neurosurgery        Today's Diagnoses     Flatback syndrome of lumbar region    -  1    Trochanteric bursitis, left hip        Leg length discrepancy        Vitamin D deficiency          Care Instructions    Follow up with [facility] for get orthotics for your shoes.  An order has been put in for you to get a trochanter bursa injection.  Follow up with Holli Ny in three months with a total spine x-ray and a vitamin D level           drawn before the appointment.          Follow-ups after your visit        Additional Services     ORTHOTICS REFERRAL       **This referral order prints off in the [facility]  (Orthotics & Prosthetics) Central Scheduling Office**    The [facility] Central Scheduling Staff will contact the patient to schedule appointments.     Central Scheduling Contact Information: [phone] ([place])    Orthotics: Bilateral Shoe/s    Please be aware that coverage of these services is subject to the terms and limitations of your health insurance plan.  Call member services at your health plan with any benefit or coverage questions.      Please bring the following to your appointment:    >>   Any x-rays, CTs or MRIs which have been performed.  Contact the facility where they were done to arrange for  prior to your scheduled appointment.    >>   List of current medications   >>   This referral request   >>   Any documents/labs given to you for this referral                  Follow-up notes from your care team     Return in about 3 months (around 11/16/2018).      Your next 10 appointments already scheduled     Aug 16, 2018  1:15 PM CDT   LAB with  LAB    Health Lab ([facility])    [address] [place] [address]  [address]st [facility] [id number] [phone]           Please do not eat 10-12 hours before your appointment if you are coming in fasting for labs on lipids, cholesterol, or glucose (sugar). This does not apply to pregnant women. Water, hot tea and black coffee (with nothing added) are okay. Do not drink other fluids, diet soda or chew gum.            Aug 20, 2018  1:00 PM CDT   New Patient Visit with Alexia Rogers MD PhD   [facility] ([facility])    [facility]  [address] [address]  [address]  [facility] [address]  [facility] [address]   [phone]            Oct 29, 2018 10:30 AM CDT   (Arrive by 10:15 AM)   Return Visit with Holli Ny PA-C   [facility] ([facility])    [address] [place] [id number] [phone]              Future tests that were ordered for you today     Open Future Orders        Priority Expected Expires Ordered    X-ray Spine complete (Cervicothoracolumbar AP and lateral - standing views preferred) [[id number]] Routine 8/16/2018 8/16/2019 8/16/2018    25 Hydroxyvitamin D2 and D3 Routine 8/16/2018 8/17/2019 8/16/2018            Who to contact     Please call your clinic at [phone] to:    Ask questions about your health    Make or cancel appointments    Discuss your medicines    Learn about your test results    Speak to your doctor            Additional Information About Your Visit        [employer] Information     [employer] gives you secure access to your electronic health record. If you see a primary care provider, you can also send messages to your care team and make appointments. If you have questions, please call your primary care clinic.  If you do not have a primary care provider, please call [phone] and they will assist you.      [employer] is an electronic gateway that provides easy, online access to your medical records. With  [employer], you can request a clinic appointment, read your test results, renew a prescription or communicate with your care team.     To access your existing account, please contact your [facility] or call [phone] for assistance.        Care EveryWhere ID     This is your Care EveryWhere ID. This could be used by other organizations to access your [facility] medical records  [id number]        Your Vitals Were     Pulse Temperature BMI (Body Mass Index)             59 96.2  F (35.7  C) (Oral) 20.63 kg/m2          Blood Pressure from Last 3 Encounters:   08/16/18 (!) 102/39   06/25/18 123/57   05/29/18 113/67    Weight from Last 3 Encounters:   08/16/18 56.7 kg (124 lb 14.4 oz)   06/25/18 57.3 kg (126 lb 5.2 oz)   05/29/18 57.2 kg (126 lb 1.6 oz)              We Performed the Following     ORTHOTICS REFERRAL          Today's Medication Changes          These changes are accurate as of 8/16/18  1:12 PM.  If you have any questions, ask your nurse or doctor.               These medicines have changed or have updated prescriptions.        Dose/Directions    montelukast 10 MG tablet   Commonly known as:  SINGULAIR   This may have changed:    - when to take this  - reasons to take this   Used for:  Allergic urticaria        Dose:  10 mg   Take 1 tablet (10 mg) by mouth daily   Quantity:  30 tablet   Refills:  11       valACYclovir 500 MG tablet   Commonly known as:  VALTREX   This may have changed:    - when to take this  - additional instructions   Used for:  Herpes simplex virus (HSV) infection        Dose:  500 mg   Take 1 tablet (500 mg) by mouth 2 times daily For 3 days when flares occur   Quantity:  30 tablet   Refills:  3                Primary Care Provider Office Phone # Fax #    Zhane Malgorzata Yost [phone]       [address] [address]        Equal Access to Services     HIPOLITO HAWK AH: nitesh August  anthonyjoceline georgette zoyadora salinas. So [facility] [phone].    ATENCIÓN: Si haris hinojosa, tiene a montez disposición servicios gratuitos de asistencia lingüística. Geovanna al [phone].    We comply with applicable federal civil rights laws and [place] laws. We do not discriminate on the basis of race, color, national origin, age, disability, sex, sexual orientation, or gender identity.            Thank you!     Thank you for choosing [facility]  for your care. Our goal is always to provide you with excellent care. Hearing back from our patients is one way we can continue to improve our services. Please take a few minutes to complete the written survey that you may receive in the mail after your visit with us. Thank you!             Your Updated Medication List - Protect others around you: Learn how to safely use, store and throw away your medicines at www.disposemymeds.org.          This list is accurate as of 8/16/18  1:12 PM.  Always use your most recent med list.                   Brand Name Dispense Instructions for use Diagnosis    ascorbic acid 500 MG tablet    VITAMIN C     2 tab daily AT BREAKFAST        CO Q 10 PO      Take 1 chew tab by mouth every morning        DAILY MULTI VITAMIN/MINERALS PO      1 TABLET DAILY AT BREAKFAST ON HOLD FOR SURGERY SINCE 05/01/2018        FLAX SEED OIL PO      takes in her cereal. STIOPPED 05/01/2018        FORTEO 600 MCG/2.4ML Soln injection   Generic drug:  teriparatide (recombinant)      Inject Subcutaneous every morning        glucosamine chondroitin 500 complex Caps      1 capsule daily at noon        magnesium citrate solution      Take 296 mLs by mouth At Bedtime        methocarbamol 500 MG tablet    ROBAXIN    45 tablet    Take 1 tablet (500 mg) by mouth 4 times daily as needed for muscle spasms    S/P lumbar fusion       montelukast 10 MG tablet    SINGULAIR    30 tablet    Take 1 tablet (10 mg) by mouth daily     Allergic urticaria       Omega-3 Fish Oil 1200 MG Caps      Take 1,200 mg by mouth 2 times daily (with meals) ON HOLD FOR SURGERY SINCE 05/01/2018        polyethylene glycol Packet    MIRALAX/GLYCOLAX    7 packet    Take 17 g by mouth 3 times daily    S/P lumbar fusion       PROBIOTIC PO      Take by mouth every other day        senna-docusate 8.6-50 MG per tablet    SENOKOT-S;PERICOLACE    30 tablet    Take 2 tablets by mouth 2 times daily    S/P lumbar fusion       UNABLE TO FIND      Apply 0.25 tsp. topically 2 times daily MEDICATION NAME: Bio- estrol, plant based hormone replacement therapy        * UNCLASSIFIED OTC PRODUCT Misc      Calcium AEP 1-2 capsules by mouth daily ?Supports cell membranes* TID        * UNCLASSIFIED OTC PRODUCT Misc      RelaxaMag? 1 capsule at bedtime as needed -  a unique magnesium supplement that helps improve sleep and stress tolerance in the evening        valACYclovir 500 MG tablet    VALTREX    30 tablet    Take 1 tablet (500 mg) by mouth 2 times daily For 3 days when flares occur    Herpes simplex virus (HSV) infection       * Notice:  This list has 2 medication(s) that are the same as other medications prescribed for you. Read the directions carefully, and ask your doctor or other care provider to review them with you.

## 2018-08-16 NOTE — LETTER
8/16/2018     RE: Marcia Kauffman  [id number]  [address] [place] [address]     Dear Colleague,    Thank you for referring your patient, Marcia Kauffman, to the [facility] NEUROSURGERY at [facility]. Please see a copy of my visit note below.    It was a pleasure to evaluate Marcia Kauffman in scheduled postop followup 3 months s/p complex spinal deformity correction surgery on 5/14/18 by Dr. Beckford and Dr. Aldana for:    Lumbar 2 To Sacral 1 Posterior Instrumented Fusion, Pelvic Fixation, Revision Of Lumbar 4-5 Instrumentation, Transforaminal Interbody Fusion with bone morphogenic protein, Oshea-Limon Osteotomies,  Lumbar 2-3, 3-4, 4-5      Marcia feels she is standing much more upright and is pleased  She is on Forteo for osteoporosis  Her preop left inguinal nerve pain and left trochanteric bursitis are agitated  She is walking 3 miles per day  Strength is 5/5 BLE, incision well healed, xrays with good alignment and stable instrumentation    PLAN-  Ok to discontinue brace now  Left trochanteric bursa injection- if no effect we can consider left L5-S1 TFESI for potential radicular irritation down left lateral leg/calf  Orthotics RX for leg length discrepancy  followup in 3 months with Holli Ny PA-C with total spine xrays and vitamin D level  Continue Forteo for minimum of one year postop, recommend completing entire 2 years course    Again, thank you for allowing me to participate in the care of your patient.      Sincerely,    Nanette Beckford MD

## 2018-08-16 NOTE — NURSING NOTE
Chief Complaint   Patient presents with     RECHECK     DOS 5/14/18   3 MO. POST OP  S/P LUMBAR FUSION. STILL HAVING LEFT HIP AND LEG PAIN. PINCHING IN THE INNER THIGH AREA. STILL HAVING WEAKNESS AND NUMBNESS. NOTING ANXIETY SINCE PROCEDURE     Karly Lira

## 2018-08-18 ASSESSMENT — ANXIETY QUESTIONNAIRES
1. FEELING NERVOUS, ANXIOUS, OR ON EDGE: NEARLY EVERY DAY
5. BEING SO RESTLESS THAT IT IS HARD TO SIT STILL: NOT AT ALL
3. WORRYING TOO MUCH ABOUT DIFFERENT THINGS: SEVERAL DAYS
GAD7 TOTAL SCORE: 8
2. NOT BEING ABLE TO STOP OR CONTROL WORRYING: SEVERAL DAYS
GAD7 TOTAL SCORE: 8
6. BECOMING EASILY ANNOYED OR IRRITABLE: SEVERAL DAYS
7. FEELING AFRAID AS IF SOMETHING AWFUL MIGHT HAPPEN: NOT AT ALL
7. FEELING AFRAID AS IF SOMETHING AWFUL MIGHT HAPPEN: NOT AT ALL
4. TROUBLE RELAXING: MORE THAN HALF THE DAYS

## 2018-08-18 ASSESSMENT — ENCOUNTER SYMPTOMS
ARTHRALGIAS: 1
BLOOD IN STOOL: 0
HOARSE VOICE: 0
CHILLS: 0
FEVER: 0
RECTAL PAIN: 0
TROUBLE SWALLOWING: 0
JAUNDICE: 0
EYE WATERING: 0
BACK PAIN: 1
VOMITING: 0
JOINT SWELLING: 0
PARALYSIS: 0
TINGLING: 0
DIARRHEA: 1
TREMORS: 0
MYALGIAS: 1
HEARTBURN: 0
NUMBNESS: 1
DIZZINESS: 0
DOUBLE VISION: 0
POLYPHAGIA: 0
MUSCLE WEAKNESS: 1
WEAKNESS: 1
ALTERED TEMPERATURE REGULATION: 0
EYE IRRITATION: 1
TASTE DISTURBANCE: 0
INSOMNIA: 1
DISTURBANCES IN COORDINATION: 0
SEIZURES: 0
BOWEL INCONTINENCE: 0
STIFFNESS: 1
ABDOMINAL PAIN: 0
SINUS PAIN: 0
POLYDIPSIA: 0
SINUS CONGESTION: 0
DEPRESSION: 0
FATIGUE: 0
EYE PAIN: 0
NERVOUS/ANXIOUS: 1
NIGHT SWEATS: 1
HALLUCINATIONS: 0
MEMORY LOSS: 0
LOSS OF CONSCIOUSNESS: 0
WEIGHT LOSS: 1
NAUSEA: 0
HEADACHES: 0
INCREASED ENERGY: 0
NECK MASS: 0
SPEECH CHANGE: 0
NECK PAIN: 1
EYE REDNESS: 0
SMELL DISTURBANCE: 0
DECREASED APPETITE: 0
MUSCLE CRAMPS: 1
DECREASED CONCENTRATION: 0
SORE THROAT: 0
WEIGHT GAIN: 0
BLOATING: 1
PANIC: 0
CONSTIPATION: 1

## 2018-08-19 ASSESSMENT — ANXIETY QUESTIONNAIRES: GAD7 TOTAL SCORE: 8

## 2018-08-20 ENCOUNTER — OFFICE VISIT (OUTPATIENT)
Dept: FAMILY MEDICINE | Facility: CLINIC | Age: 68
End: 2018-08-20
Attending: FAMILY MEDICINE
Payer: COMMERCIAL

## 2018-08-20 VITALS
HEIGHT: 66 IN | DIASTOLIC BLOOD PRESSURE: 60 MMHG | WEIGHT: 124 LBS | HEART RATE: 64 BPM | BODY MASS INDEX: 19.93 KG/M2 | SYSTOLIC BLOOD PRESSURE: 115 MMHG

## 2018-08-20 DIAGNOSIS — M81.0 SENILE OSTEOPOROSIS: Primary | ICD-10-CM

## 2018-08-20 DIAGNOSIS — M70.62 TROCHANTERIC BURSITIS OF LEFT HIP: Primary | ICD-10-CM

## 2018-08-20 LAB
DEPRECATED CALCIDIOL+CALCIFEROL SERPL-MC: <57 UG/L (ref 20–75)
VITAMIN D2 SERPL-MCNC: <5 UG/L
VITAMIN D3 SERPL-MCNC: 52 UG/L

## 2018-08-20 PROCEDURE — G0463 HOSPITAL OUTPT CLINIC VISIT: HCPCS | Mod: ZF

## 2018-08-20 ASSESSMENT — PAIN SCALES - GENERAL: PAINLEVEL: NO PAIN (0)

## 2018-08-20 NOTE — LETTER
"8/20/2018       RE: Marcia Kauffman  [address] [facility] [address]     Dear Colleague,    Thank you for referring your patient, Marcia Kauffman, to the [facility] at [facility]. Please see a copy of my visit note below.    Marcia is a  67 year old female post menopausal] [GR0, P0] that presents today for osteoporosis consult. Had a spine fusion from T7-S1 in 5/2018 - Has been on Forteo since July 2017.  Has enough Forteo through the end of this month. Needs to change from primary for prescribing Forteo.    Referring Physician: Nanette Beckford MD; Muriel Yost MD - [facility].      HPI     Have you ever had a bone density test? Yes  Where =[facility]   When = 6/2017; prev 2015 in [place]  Spine Tscore = L1-2 T=  -1.8  Left neck Tscore = -1.8  Total left hip Tscore = not available  Right neck Tscore = -1.3  Total Right hip Tscore = not available  Radius 33% -3.3  Have you received any x-ray dye or contrast in the last ten days? No  How many servings of dairy products do you consume per day? 2-3 Type: milk - 1 cup with cereal, 1 glass with evening meal, cheese, protein drinks  Do you take a multi-vitamin daily? Yes  Vit D was 52 8/16/18  Do you take a vitamin D supplement? No  Do you take a calcium supplement daily? Calcium carbonate 600mg/pill  - 1 bid   400IU in each pill   Do you take a supplement containing strontium? No  Are you exposed to natural sunlight at least 20 minutes three times a week? Yes  Have you broken any bones during your adult life? No  How tall were you at age 23? 66\"  Have you gone through menopause? Yes  Did your menopause occur before age 43? No  Have you taken hormone therapy? Yes. Details: for 13 yrs  - currently on hormone cream - use once daily (for 1.5 yrs)    Social History   reports that she has never smoked. She has never used smokeless tobacco. She reports that she drinks " alcohol. She reports that she does not use illicit drugs.  Do you smoke cigarettes? No  Do you exercise? Yes. Details: walking 2-3 mi /day - core strengthening   Do you drink alcohol? Yes. Details: sparse - 1-2x/yr     Medication History  Have you taken any of the following medications?   Blood thinner (Coumadin or Heparin): No   Chemotherapy (ex: Lupron, Arimidex): No   Depo Provera: No   Anti-Seizure (ex: Dilantin, Depakote): No   Steroids (ex: Prednisone, Cortisone): No   Thyroid (ex: Synthroid): No  Have you used any of the following medications?   Actonel (Risedronate): No   Aredia (Pamidronate): No   Boniva (Ibindronate): No   Didronil (Etidronate): No   Evista (Raloxifene): No   Fosamax (Alendronate): No   Forteo (Parathyroid hormone) injections: since 7/2017   HCTZ (Thiazide): No   Calcitonin nasal spray: No   Reclast or Zometa (Zolendronate): No   Prolia (Denosumab): No   HT: yes as above   Current Outpatient Prescriptions   Medication Sig Dispense Refill     Coenzyme Q10 (CO Q 10 PO) Take 1 chew tab by mouth every morning        DAILY MULTI VITAMIN/MINERALS OR 1 TABLET DAILY AT BREAKFAST ON HOLD FOR SURGERY SINCE 05/01/2018       FLAX SEED OIL OR takes in her cereal. STIOPPED 05/01/2018       GLUCOSAMINE CHONDR 500 COMPLEX OR CAPS 1 capsule daily at noon       magnesium citrate solution Take 296 mLs by mouth At Bedtime       montelukast (SINGULAIR) 10 MG tablet Take 1 tablet (10 mg) by mouth daily (Patient taking differently: Take 10 mg by mouth daily as needed ) 30 tablet 11     Omega-3 Fatty Acids (OMEGA-3 FISH OIL) 1200 MG CAPS Take 1,200 mg by mouth 2 times daily (with meals) ON HOLD FOR SURGERY SINCE 05/01/2018       Probiotic Product (PROBIOTIC PO) Take by mouth every other day       teriparatide, recombinant, (FORTEO) 600 MCG/2.4ML SOLN injection Inject Subcutaneous every morning       UNABLE TO FIND Apply 0.25 tsp. topically 2 times daily MEDICATION NAME: Bio- estrol, plant based hormone  replacement therapy        UNCLASSIFIED OTC PRODUCT MISC Calcium AEP 1-2 capsules by mouth daily  Supports cell membranes* TID       UNCLASSIFIED OTC PRODUCT MISC RelaxaMag  1 capsule at bedtime as needed -  a unique magnesium supplement that helps improve sleep and stress tolerance in the evening       valACYclovir (VALTREX) 500 MG tablet Take 1 tablet (500 mg) by mouth 2 times daily For 3 days when flares occur (Patient taking differently: Take 500 mg by mouth every morning For 3 days when flares occur) 30 tablet 3     VITAMIN C 500 MG OR TABS 2 tab daily AT BREAKFAST            Allergies   Allergen Reactions     Dust Mite Extract      Escitalopram Unknown     Nausea and headache     Hydrocodone Nausea and Vomiting     n/v     Meperidine Nausea and Vomiting     vomiting  vomiting  vomiting     Mold      Trees        Past Medical History  Do you have or have you had any of the following?   Celiac sprue (wheat intolerance):No   Chronic low back problems (ex: scohosis, arthritis): yes - had 2 back fusions    Diabetes mellitus: No   High blood calcium level: No   Hip or spine injury: No   History of an eating disorder: No   History of gastric bypass: No   Hyperparathyroidism: No   Inflammatory bowel disease (ex: Crohn's, ulcerative colitis): No   Kidney disease: No   Kidney stones: No   Liver disease: No   Lupus: No   Overactive thyroid gland: No   Rhuematoid arthritis: No    Have you had any of the following?   Hysterectomy: No   Ovaries removed: No   Breast cancer: No   Family history of breast cancer: Yes. Details: mother age 69    Family History   Problem Relation Age of Onset     Breast Cancer Mother      cancer -free for 17 years, XRT     Arthritis Mother      Eye Disorder Mother      macular degeneration     HEART DISEASE Mother      murmur     Cerebrovascular Disease Mother      Diabetes Father           Arthritis Father      Arthritis Sister      Blood Disease Paternal Grandfather      Family History  "Negative No family hx of      Is there a family history of osteoporosis? Yes. Details: mother,   Did either parent have a hip fracture? No    ROS:    Clinic Measurements  Vitals: /60  Pulse 64  Ht 1.664 m (5' 5.5\")  Wt 56.2 kg (124 lb)  BMI 20.32 kg/m2  BMI= Body mass index is 20.32 kg/(m^2).    Physical exam  Constitutional: Well appearing woman in no acute distress. Walking with a walking stick   Psychological: appropriate mood.  Neck: No thyroidmegaly. No jugular venous distension, no carotid bruits.  Cardiovascular: regular rate and rhythm, normal S1 and S2, no murmurs, rubs or gallops, peripheral pulses full and symmetric   Respiratory: clear to auscultation, no wheezes or crackles, normal breath sounds.  Gastrointestinal: positive bowel sounds,no hepatosplenomegaly, no masses. No guarding or rebound. Mild tenderness of LLQ no rebound   Spine: no marked tenderness, well healed scar - limited ROM, left hip slightly higher than right hip   Musculoskeletal: no edema    Skin: no concerning lesions, no jaundice.  Neurological: cranial nerves intact, normal strength, reflexes at patella and biceps normal, normal gait, no tremor.     LAB  Vertebra; Fracture Assessment: not indicated  Dexa Scan: 6/2017 due 9/2019      FRAX Assessment Tool: [N/A, on Forteo   Risk Factors: Age, PM, T score of radius     ASSESSMENT:  PM female who has OP by T score of her radius and is currently 1 yr on Forteo for initial failure of spine fusion and had second spine surgery in 5/2018.  Her primary was ordering Forteo but no longer wants to do this so she was referred here.  Discussed calcium and vitamin D. Would continue Forteo another year - will need to get PA and get a DXA in 9/2019 and then consider changing to prolia or a bisphosphonate.      PLAN:  DXA next Sept 2019 on same machine - at [place]   Continue with Forteo for another year  Osiris Negar will have to get prior authorization   Patient should take 1200mg of " calcium/day in divided doses diet and all supplements and vitamin D3 1000IU/day.  You only absorb 5-600mg of calcium at a time  Try calcium citrate 225mg tablet or 315mg tablet to supplement  Look at all your supplements - citrate says serving is 600mg but a serving is 2 pills  See me after you get your next DXA       I spent a total of 40 minutes face-to-face with the patient during today's office visit.  Over 50% of this time was spent counseling the patient and/or coordinating care regarding OP, medications and DXA report.  See note for details.          Thank you for allowing me to participate in the care of your patient.  Please do not hesitate to call with questions or concerns.    Again, thank you for allowing me to participate in the care of your patient.      Sincerely,    Alexia Rogers MD PhD    CC Nanette Beckford MD; Muriel Yost MD - [facility].

## 2018-08-20 NOTE — PROGRESS NOTES
"Marcia is a  67 year old female post menopausal] [GR0, P0] that presents today for osteoporosis consult. Had a spine fusion from T7-S1 in 5/2018 - Has been on Forteo since July 2017.  Has enough Forteo through the end of this month. Needs to change from primary for prescribing Forteo.    Referring Physician: Nanette Beckford MD; Muriel Yost MD - [facility].      HPI     Have you ever had a bone density test? Yes  Where =[facility]   When = 6/2017; prev 2015 in [place]  Spine Tscore = L1-2 T=  -1.8  Left neck Tscore = -1.8  Total left hip Tscore = not available  Right neck Tscore = -1.3  Total Right hip Tscore = not available  Radius 33% -3.3  Have you received any x-ray dye or contrast in the last ten days? No  How many servings of dairy products do you consume per day? 2-3 Type: milk - 1 cup with cereal, 1 glass with evening meal, cheese, protein drinks  Do you take a multi-vitamin daily? Yes  Vit D was 52 8/16/18  Do you take a vitamin D supplement? No  Do you take a calcium supplement daily? Calcium carbonate 600mg/pill  - 1 bid   400IU in each pill   Do you take a supplement containing strontium? No  Are you exposed to natural sunlight at least 20 minutes three times a week? Yes  Have you broken any bones during your adult life? No  How tall were you at age 23? 66\"  Have you gone through menopause? Yes  Did your menopause occur before age 43? No  Have you taken hormone therapy? Yes. Details: for 13 yrs  - currently on hormone cream - use once daily (for 1.5 yrs)    Social History   reports that she has never smoked. She has never used smokeless tobacco. She reports that she drinks alcohol. She reports that she does not use illicit drugs.  Do you smoke cigarettes? No  Do you exercise? Yes. Details: walking 2-3 mi /day - core strengthening   Do you drink alcohol? Yes. Details: sparse - 1-2x/yr     Medication History  Have you taken any of the following medications?   Blood thinner " (Coumadin or Heparin): No   Chemotherapy (ex: Lupron, Arimidex): No   Depo Provera: No   Anti-Seizure (ex: Dilantin, Depakote): No   Steroids (ex: Prednisone, Cortisone): No   Thyroid (ex: Synthroid): No  Have you used any of the following medications?   Actonel (Risedronate): No   Aredia (Pamidronate): No   Boniva (Ibindronate): No   Didronil (Etidronate): No   Evista (Raloxifene): No   Fosamax (Alendronate): No   Forteo (Parathyroid hormone) injections: since 7/2017   HCTZ (Thiazide): No   Calcitonin nasal spray: No   Reclast or Zometa (Zolendronate): No   Prolia (Denosumab): No   HT: yes as above   Current Outpatient Prescriptions   Medication Sig Dispense Refill     Coenzyme Q10 (CO Q 10 PO) Take 1 chew tab by mouth every morning        DAILY MULTI VITAMIN/MINERALS OR 1 TABLET DAILY AT BREAKFAST ON HOLD FOR SURGERY SINCE 05/01/2018       FLAX SEED OIL OR takes in her cereal. STIOPPED 05/01/2018       GLUCOSAMINE CHONDR 500 COMPLEX OR CAPS 1 capsule daily at noon       magnesium citrate solution Take 296 mLs by mouth At Bedtime       montelukast (SINGULAIR) 10 MG tablet Take 1 tablet (10 mg) by mouth daily (Patient taking differently: Take 10 mg by mouth daily as needed ) 30 tablet 11     Omega-3 Fatty Acids (OMEGA-3 FISH OIL) 1200 MG CAPS Take 1,200 mg by mouth 2 times daily (with meals) ON HOLD FOR SURGERY SINCE 05/01/2018       Probiotic Product (PROBIOTIC PO) Take by mouth every other day       teriparatide, recombinant, (FORTEO) 600 MCG/2.4ML SOLN injection Inject Subcutaneous every morning       UNABLE TO FIND Apply 0.25 tsp. topically 2 times daily MEDICATION NAME: Bio- estrol, plant based hormone replacement therapy        UNCLASSIFIED OTC PRODUCT MISC Calcium AEP 1-2 capsules by mouth daily  Supports cell membranes* TID       UNCLASSIFIED OTC PRODUCT MISC RelaxaMag  1 capsule at bedtime as needed -  a unique magnesium supplement that helps improve sleep and stress tolerance in the evening        valACYclovir (VALTREX) 500 MG tablet Take 1 tablet (500 mg) by mouth 2 times daily For 3 days when flares occur (Patient taking differently: Take 500 mg by mouth every morning For 3 days when flares occur) 30 tablet 3     VITAMIN C 500 MG OR TABS 2 tab daily AT BREAKFAST            Allergies   Allergen Reactions     Dust Mite Extract      Escitalopram Unknown     Nausea and headache     Hydrocodone Nausea and Vomiting     n/v     Meperidine Nausea and Vomiting     vomiting  vomiting  vomiting     Mold      Trees        Past Medical History  Do you have or have you had any of the following?   Celiac sprue (wheat intolerance):No   Chronic low back problems (ex: scohosis, arthritis): yes - had 2 back fusions    Diabetes mellitus: No   High blood calcium level: No   Hip or spine injury: No   History of an eating disorder: No   History of gastric bypass: No   Hyperparathyroidism: No   Inflammatory bowel disease (ex: Crohn's, ulcerative colitis): No   Kidney disease: No   Kidney stones: No   Liver disease: No   Lupus: No   Overactive thyroid gland: No   Rhuematoid arthritis: No    Have you had any of the following?   Hysterectomy: No   Ovaries removed: No   Breast cancer: No   Family history of breast cancer: Yes. Details: mother age 69    Family History   Problem Relation Age of Onset     Breast Cancer Mother      cancer -free for 17 years, XRT     Arthritis Mother      Eye Disorder Mother      macular degeneration     HEART DISEASE Mother      murmur     Cerebrovascular Disease Mother      Diabetes Father           Arthritis Father      Arthritis Sister      Blood Disease Paternal Grandfather      Family History Negative No family hx of      Is there a family history of osteoporosis? Yes. Details: mother,   Did either parent have a hip fracture? No    ROS:  Answers for HPI/ROS submitted by the patient on 2018   ENDER 7 TOTAL SCORE: 8  PHQ-2 Score: 0  General Symptoms: Yes  Skin Symptoms: No  HENT Symptoms:  Yes  EYE SYMPTOMS: Yes  HEART SYMPTOMS: No  LUNG SYMPTOMS: No  INTESTINAL SYMPTOMS: Yes  URINARY SYMPTOMS: No  GYNECOLOGIC SYMPTOMS: No  BREAST SYMPTOMS: No  SKELETAL SYMPTOMS: Yes  BLOOD SYMPTOMS: No  NERVOUS SYSTEM SYMPTOMS: Yes  MENTAL HEALTH SYMPTOMS: Yes  Fever: No  Loss of appetite: No  Weight loss: Yes  Weight gain: No  Fatigue: No  Night sweats: Yes  Chills: No  Increased stress: Yes  Excessive hunger: No  Excessive thirst: No  Feeling hot or cold when others believe the temperature is normal: No  Loss of height: No  Post-operative complications: No  Surgical site pain: No  Hallucinations: No  Change in or Loss of Energy: No  Hyperactivity: No  Confusion: No  Ear pain: No  Ear discharge: No  Hearing loss: No  Tinnitus: No  Nosebleeds: No  Congestion: No  Sinus pain: No  Trouble swallowing: No   Voice hoarseness: No  Mouth sores: No  Sore throat: No  Tooth pain: Yes  Gum tenderness: No  Bleeding gums: No  Change in taste: No  Change in sense of smell: No  Dry mouth: No  Hearing aid used: No  Neck lump: No  Eye pain: No  Vision loss: No  Dry eyes: Yes  Watery eyes: No  Eye bulging: No  Double vision: No  Flashing of lights: No  Spots: No  Floaters: Yes  Redness: No  Crossed eyes: No  Tunnel Vision: No  Yellowing of eyes: No  Eye irritation: Yes  Heart burn or indigestion: No  Nausea: No  Vomiting: No  Abdominal pain: No  Bloating: Yes  Constipation: Yes  Diarrhea: Yes  Blood in stool: No  Black stools: No  Rectal or Anal pain: No  Fecal incontinence: No  Yellowing of skin or eyes: No  Vomit with blood: No  Change in stools: No  Back pain: Yes  Muscle aches: Yes  Neck pain: Yes  Swollen joints: No  Joint pain: Yes  Bone pain: No  Muscle cramps: Yes  Muscle weakness: Yes  Joint stiffness: Yes  Bone fracture: No  Trouble with coordination: No  Dizziness or trouble with balance: No  Fainting or black-out spells: No  Memory loss: No  Headache: No  Seizures: No  Speech problems: No  Tingling: No  Tremor:  "No  Weakness: Yes  Difficulty walking: No  Paralysis: No  Numbness: Yes  Nervous or Anxious: Yes  Depression: No  Trouble sleeping: Yes  Trouble thinking or concentrating: No  Mood changes: No  Panic attacks: No      Clinic Measurements  Vitals: /60  Pulse 64  Ht 1.664 m (5' 5.5\")  Wt 56.2 kg (124 lb)  BMI 20.32 kg/m2  BMI= Body mass index is 20.32 kg/(m^2).    Physical exam  Constitutional: Well appearing woman in no acute distress. Walking with a walking stick   Psychological: appropriate mood.  Neck: No thyroidmegaly. No jugular venous distension, no carotid bruits.  Cardiovascular: regular rate and rhythm, normal S1 and S2, no murmurs, rubs or gallops, peripheral pulses full and symmetric   Respiratory: clear to auscultation, no wheezes or crackles, normal breath sounds.  Gastrointestinal: positive bowel sounds,no hepatosplenomegaly, no masses. No guarding or rebound. Mild tenderness of LLQ no rebound   Spine: no marked tenderness, well healed scar - limited ROM, left hip slightly higher than right hip   Musculoskeletal: no edema    Skin: no concerning lesions, no jaundice.  Neurological: cranial nerves intact, normal strength, reflexes at patella and biceps normal, normal gait, no tremor.     LAB  Vertebra; Fracture Assessment: not indicated  Dexa Scan: 6/2017 due 9/2019      FRAX Assessment Tool: [N/A, on Forteo   Risk Factors: Age, PM, T score of radius     ASSESSMENT:  PM female who has OP by T score of her radius and is currently 1 yr on Forteo for initial failure of spine fusion and had second spine surgery in 5/2018.  Her primary was ordering Forteo but no longer wants to do this so she was referred here.  Discussed calcium and vitamin D. Would continue Forteo another year - will need to get PA and get a DXA in 9/2019 and then consider changing to prolia or a bisphosphonate.      PLAN:  DXA next Sept 2019 on same machine - at [place]   Continue with Forteo for another year  Osiris Bills will " have to get prior authorization   Patient should take 1200mg of calcium/day in divided doses diet and all supplements and vitamin D3 1000IU/day.  You only absorb 5-600mg of calcium at a time  Try calcium citrate 225mg tablet or 315mg tablet to supplement  Look at all your supplements - citrate says serving is 600mg but a serving is 2 pills  See me after you get your next DXA       I spent a total of 40 minutes face-to-face with the patient during today's office visit.  Over 50% of this time was spent counseling the patient and/or coordinating care regarding OP, medications and DXA report.  See note for details.          Thank you for allowing me to participate in the care of your patient.  Please do not hesitate to call with questions or concerns.    Sincerely,  Alexia Rogers MD, PhD  CC Nanette Beckford MD; Muriel Yost MD - [facility].

## 2018-08-20 NOTE — PATIENT INSTRUCTIONS
DXA next Sept 2019 on same machine  Continue with Forteo for another year  Osiris Negar will have to get prior authorization   Patient should take 1200mg of calcium/day in divided doses diet and all supplements and vitamin D3 1000IU/day.  You only absorb 5-600mg of calcium at a time  Try calcium citrate 225mg tablet or 315mg tablet to supplement  Look at all your supplements - citrate says serving is 600mg but a serving is 2 pills  See me after you get your next DXA

## 2018-08-20 NOTE — MR AVS SNAPSHOT
After Visit Summary   8/20/2018    Marcia Kauffman    MRN: [id number]           Patient Information     Date Of Birth          1950        Visit Information        Provider Department      8/20/2018 1:00 PM Alexia Rogers MD PhD [facility]        Care Instructions    DXA next Sept 2019 on same machine  Continue with Forteo for another year  Osiris Bills will have to get prior authorization   Patient should take 1200mg of calcium/day in divided doses diet and all supplements and vitamin D3 1000IU/day.  You only absorb 5-600mg of calcium at a time  Try calcium citrate 225mg tablet or 315mg tablet to supplement  Look at all your supplements - citrate says serving is 600mg but a serving is 2 pills  See me after you get your next DXA             Follow-ups after your visit        Your next 10 appointments already scheduled     Nov 15, 2018 10:00 AM CST   XR SPINE COMPLETE 2 VIEWS with UCXR4   [facility] ([facility])    [address]  1st Floor  [facility] [address] [phone]           Please bring a list of your current medicines to your exam. (Include vitamins, minerals and over-thecounter medicines.) Leave your valuables at home.  Tell your doctor if there is a chance you may be pregnant.  You do not need to do anything special for this exam.            Nov 15, 2018 10:30 AM CST   (Arrive by 10:15 AM)   Return Visit with Holli Ny PA-C   [facility] ([facility])    [address]  3rd Floor  [facility] [address] [phone]              Who to contact     Please call your clinic at [phone] to:    Ask questions about your health    Make or cancel appointments    Discuss your medicines    Learn about your test results    Speak to your doctor            Additional Information About Your Visit        [email or web address] Information     [facility] gives you secure  "access to your electronic health record. If you see a primary care provider, you can also send messages to your care team and make appointments. If you have questions, please call your primary care clinic.  If you do not have a primary care provider, please call [phone] and they will assist you.      [employer] is an electronic gateway that provides easy, online access to your medical records. With [employer], you can request a clinic appointment, read your test results, renew a prescription or communicate with your care team.     To access your existing account, please contact your [facility] or call [phone] for assistance.        Care EveryWhere ID     This is your Care EveryWhere ID. This could be used by other organizations to access your [facility] medical records  [id number]        Your Vitals Were     Pulse Height BMI (Body Mass Index)             64 1.664 m (5' 5.5\") 20.32 kg/m2          Blood Pressure from Last 3 Encounters:   08/20/18 115/60   08/16/18 (!) 102/39   06/25/18 123/57    Weight from Last 3 Encounters:   08/20/18 56.2 kg (124 lb)   08/16/18 56.7 kg (124 lb 14.4 oz)   06/25/18 57.3 kg (126 lb 5.2 oz)              Today, you had the following     No orders found for display         Today's Medication Changes          These changes are accurate as of 8/20/18  2:01 PM.  If you have any questions, ask your nurse or doctor.               These medicines have changed or have updated prescriptions.        Dose/Directions    montelukast 10 MG tablet   Commonly known as:  SINGULAIR   This may have changed:    - when to take this  - reasons to take this   Used for:  Allergic urticaria        Dose:  10 mg   Take 1 tablet (10 mg) by mouth daily   Quantity:  30 tablet   Refills:  11       valACYclovir 500 MG tablet   Commonly known as:  VALTREX   This may have changed:    - when to take this  - additional instructions   Used for:  Herpes simplex virus (HSV) infection        " Dose:  500 mg   Take 1 tablet (500 mg) by mouth 2 times daily For 3 days when flares occur   Quantity:  30 tablet   Refills:  3                Primary Care Provider Office Phone # Fax #    Zhane Malgorzata Yost [phone]       [facility]  [address] [facility] [facility] [address]        Equal Access to Services     [facility]: Hadii aad ku hadasho Soomaali, waaxda luqadaha, qaybta kaalmada adeegyada, dora dugan hayaan adecesar shafefrdevenwilman bhatti . So [facility] [phone].    ATENCIÓN: Si eanla espchristina, tiene a montez disposición servicios gratuitos de asistencia lingüística. Geovanna al [phone].    We comply with applicable federal civil rights laws and [place] laws. We do not discriminate on the basis of race, color, national origin, age, disability, sex, sexual orientation, or gender identity.            Thank you!     Thank you for choosing [facility]  for your care. Our goal is always to provide you with excellent care. Hearing back from our patients is one way we can continue to improve our services. Please take a few minutes to complete the written survey that you may receive in the mail after your visit with us. Thank you!             Your Updated Medication List - Protect others around you: Learn how to safely use, store and throw away your medicines at www.disposemymeds.org.          This list is accurate as of 8/20/18  2:01 PM.  Always use your most recent med list.                   Brand Name Dispense Instructions for use Diagnosis    ascorbic acid 500 MG tablet    VITAMIN C     2 tab daily AT BREAKFAST        CALCIUM-VITAMIN D PO           CO Q 10 PO      Take 1 chew tab by mouth every morning        DAILY MULTI VITAMIN/MINERALS PO      1 TABLET DAILY AT BREAKFAST ON HOLD FOR SURGERY SINCE 05/01/2018        FLAX SEED OIL PO      takes in her cereal. STIOPPED 05/01/2018        FORTEO 600 MCG/2.4ML Soln injection   Generic drug:  teriparatide (recombinant)       Inject Subcutaneous every morning        glucosamine chondroitin 500 complex Caps      1 capsule daily at noon        magnesium citrate solution      Take 296 mLs by mouth At Bedtime        montelukast 10 MG tablet    SINGULAIR    30 tablet    Take 1 tablet (10 mg) by mouth daily    Allergic urticaria       Omega-3 Fish Oil 1200 MG Caps      Take 1,200 mg by mouth 2 times daily (with meals) ON HOLD FOR SURGERY SINCE 05/01/2018        PROBIOTIC PO      Take by mouth every other day        UNABLE TO FIND      Apply 0.25 tsp. topically 2 times daily MEDICATION NAME: Bio- estrol, plant based hormone replacement therapy        * UNCLASSIFIED OTC PRODUCT Misc      Calcium AEP 1-2 capsules by mouth daily ?Supports cell membranes* TID        * UNCLASSIFIED OTC PRODUCT Misc      RelaxaMag? 1 capsule at bedtime as needed -  a unique magnesium supplement that helps improve sleep and stress tolerance in the evening        valACYclovir 500 MG tablet    VALTREX    30 tablet    Take 1 tablet (500 mg) by mouth 2 times daily For 3 days when flares occur    Herpes simplex virus (HSV) infection       * Notice:  This list has 2 medication(s) that are the same as other medications prescribed for you. Read the directions carefully, and ask your doctor or other care provider to review them with you.

## 2018-08-22 ENCOUNTER — TELEPHONE (OUTPATIENT)
Dept: PHARMACY | Facility: CLINIC | Age: 68
End: 2018-08-22

## 2018-08-22 DIAGNOSIS — M81.0 OSTEOPOROSIS, UNSPECIFIED OSTEOPOROSIS TYPE, UNSPECIFIED PATHOLOGICAL FRACTURE PRESENCE: ICD-10-CM

## 2018-08-22 DIAGNOSIS — Z98.1 HISTORY OF LUMBAR FUSION: ICD-10-CM

## 2018-08-22 NOTE — TELEPHONE ENCOUNTER
Writer called and left a message for the pt.  Her vitamin D level looks good.  Continue to take the supplements.

## 2018-08-23 NOTE — TELEPHONE ENCOUNTER
Forteo order sent per Dr. Rogers's request.      Called pharmacy ([facility]) and left VM to call us if a new PA is needed.  Some insurance companies will approve the full 24 months at initiation, so it's not yet clear if this is necessary.    Osiris Bills, Pharm.D., BCPS

## 2018-08-24 NOTE — TELEPHONE ENCOUNTER
Dr. Beckford' progress note from 8/16/2018 was faxed to the [facility]  [phone] fax

## 2018-08-28 NOTE — TELEPHONE ENCOUNTER
Confirmed with Krista Fonseca that patient's Forteo is covered by insurance and is ready/available for pick-up.  Informed patient, who indicated she did receive a letter indicating that her Forteo PA is good until July 2019 when she completes treatment.    Osiris Bills, Pharm.D., BCPS

## 2018-11-06 ENCOUNTER — TRANSFERRED RECORDS (OUTPATIENT)
Dept: HEALTH INFORMATION MANAGEMENT | Facility: CLINIC | Age: 68
End: 2018-11-06

## 2018-11-15 ENCOUNTER — RADIANT APPOINTMENT (OUTPATIENT)
Dept: GENERAL RADIOLOGY | Facility: CLINIC | Age: 68
End: 2018-11-15
Attending: NEUROLOGICAL SURGERY
Payer: COMMERCIAL

## 2018-11-15 ENCOUNTER — OFFICE VISIT (OUTPATIENT)
Dept: NEUROSURGERY | Facility: CLINIC | Age: 68
End: 2018-11-15
Payer: COMMERCIAL

## 2018-11-15 VITALS
HEIGHT: 66 IN | WEIGHT: 129 LBS | HEART RATE: 62 BPM | SYSTOLIC BLOOD PRESSURE: 115 MMHG | OXYGEN SATURATION: 100 % | DIASTOLIC BLOOD PRESSURE: 50 MMHG | BODY MASS INDEX: 20.73 KG/M2

## 2018-11-15 DIAGNOSIS — M25.552 PAIN OF LEFT HIP JOINT: ICD-10-CM

## 2018-11-15 DIAGNOSIS — M40.35 FLATBACK SYNDROME OF THORACOLUMBAR REGION: ICD-10-CM

## 2018-11-15 DIAGNOSIS — M40.36 FLATBACK SYNDROME OF LUMBAR REGION: ICD-10-CM

## 2018-11-15 DIAGNOSIS — Z98.1 S/P LUMBAR FUSION: ICD-10-CM

## 2018-11-15 DIAGNOSIS — E55.9 VITAMIN D DEFICIENCY: ICD-10-CM

## 2018-11-15 DIAGNOSIS — E55.9 VITAMIN D DEFICIENCY: Primary | ICD-10-CM

## 2018-11-15 DIAGNOSIS — M40.30 FLATBACK SYNDROME: ICD-10-CM

## 2018-11-15 RX ORDER — METOPROLOL SUCCINATE 25 MG/1
25 TABLET, EXTENDED RELEASE ORAL PRN
COMMUNITY
Start: 2018-09-26 | End: 2019-10-14

## 2018-11-15 ASSESSMENT — PAIN SCALES - GENERAL: PAINLEVEL: SEVERE PAIN (6)

## 2018-11-15 NOTE — PROGRESS NOTES
Neurosurgery clinic post op   Date of visit: 11/15/18         Procedure:  05/14/2018  SAGAR Rowe   DIAGNOSES:     1.  Flat back syndrome.    2.  Previous L4-L5 posterior decompression and posterior lateral instrumented fusion.   3.  Osteoporosis.       PROCEDURE PERFORMED:   1.  Stealth-guided L2 through sacrum posterior lateral instrumented fusion with local harvest autograft and allograft.   2.  Removal and replacement of bilateral L4-L5 instrumentation.   3.  Pelvic fixation with S2 alar iliac screws.   4.  Bilateral transforaminal lumbar interbody fusion L2-L3, L3-L4, L4-L5.   5.  Bilateral Oshea-Limon osteotomies for deformity correction, bilateral L2-L3, L3-L4 and L4-L5.     INDICATIONS FOR SURGERY:  Marcia Kauffman is a 67-year-old woman who presented with a prior posterior lateral instrumented fusion at L4-L5 and transitional segment anatomy with fused disk spaces at L5-S1 and rudimentary S1-S2 disc space.  She had a pelvic incidence of 62 degrees and a lumbar lordosis of 36 degrees for significant pelvic incidence to lumbar lordosis mismatch.  The patient had symptomatic standing and ambulation intolerance with severe back pain.  She was counseled as to risks versus benefits of revision posterior fusion with potential extension up into the thoracic spine versus potential for correction being obtained solely in the lumbar spine.  After a full discussion of risks versus benefits, she elected to proceed.  Of note, she was diagnosed with osteoporosis and was maintained on teriparatide for several months prior to proceeding with surgery in order to improve her bone quality.       A 2 surgeon approach was used to minimize operative blood loss and operative time given the complex deformity correction required.   HPI:  She is now 6 months post op.  She is on Forteo for osteoporosis,   At last visit she was walking 3 miles per day.  But she was having some left leg pain.  A left bursal injection  was ordered.  That gave her some relief for about a week.  When her pain returned it appeared in the buttock and in the groin.  She has pain when weightbearing, it is eased when offloading the joint.  Every once in a while it goes all the way to the lower leg, her chiropractor thinks it may be piriformis.   She has been weaned out of the brace.  She had x-rays today, but vitamin D was not checked today.  There was a miscommunication.    August 16 vitamin D.  25 OH Vit D2 <5   ug/L Final 08/16/2018  1:27 PM 51   25 OH Vit D3 52   ug/L Final 08/16/2018  1:27 PM 51   25 OH Vit D total <57  20 - 75 ug/L Final 08/16/2018  1:27 PM 51   Comment:       Patient Supplied Answers To the [facility] Pain Questionnaire  [facility] Pain -  Patient Entered Questionnaire/Answers 8/7/2018   What number best describes your pain right now:  0 = No pain  to  10 = Worst pain imaginable 4   How would you describe the pain? numbness, dull, aching   Which of the following worsen your pain? standing, sitting, walking   Which of the following improve or reduce your pain?  lying down, walking, exercise, medication, relaxation   What number best describes your average pain for the past week:  0 = No pain  to  10 = Worst pain imaginable 5   What number best describes your LOWEST pain in past 24 hours:  0 = No pain  to  10 = Worst pain imaginable 3   What number best describes your WORST pain in past 24 hours:  0 = No pain  to  10 = Worst pain imaginable 7   When is your pain worst? PM, Night   What non-medicine treatments have you already had for your pain? physical therapy, acupuncture, chiropractic care, TENS (electrical stimulator), surgery, exercise   Have you tried treating your pain with medication?  Yes   Are you currently taking medications for your pain? Yes            Current Outpatient Prescriptions:      Acetaminophen (TYLENOL PO), , Disp: , Rfl:      apixaban ANTICOAGULANT (ELIQUIS) 5 MG tablet, Take 5 mg by mouth, Disp: , Rfl:      CALCIUM-VITAMIN  D PO, , Disp: , Rfl:      Coenzyme Q10 (CO Q 10 PO), Take 1 chew tab by mouth every morning , Disp: , Rfl:      DAILY MULTI VITAMIN/MINERALS OR, 1 TABLET DAILY AT BREAKFAST ON HOLD FOR SURGERY SINCE 05/01/2018, Disp: , Rfl:      FLAX SEED OIL OR, takes in her cereal. STIOPPED 05/01/2018, Disp: , Rfl:      GLUCOSAMINE CHONDR 500 COMPLEX OR CAPS, 1 capsule daily at noon, Disp: , Rfl:      insulin pen needle (B-D U/F) 31G X 5 MM, Use once daily as directed with Forteo, Disp: 100 each, Rfl: 3     magnesium citrate solution, Take 296 mLs by mouth At Bedtime, Disp: , Rfl:      metoprolol succinate (TOPROL-XL) 25 MG 24 hr tablet, Take 25 mg by mouth as needed, Disp: , Rfl:      montelukast (SINGULAIR) 10 MG tablet, Take 1 tablet (10 mg) by mouth daily (Patient taking differently: Take 10 mg by mouth daily as needed ), Disp: 30 tablet, Rfl: 11     Omega-3 Fatty Acids (OMEGA-3 FISH OIL) 1200 MG CAPS, Take 1,200 mg by mouth 2 times daily (with meals) ON HOLD FOR SURGERY SINCE 05/01/2018, Disp: , Rfl:      Probiotic Product (PROBIOTIC PO), Take by mouth every other day, Disp: , Rfl:      teriparatide, recombinant, (FORTEO) 600 MCG/2.4ML SOLN injection, Inject 0.08 mLs (20 mcg) Subcutaneous daily, Disp: 2.4 mL, Rfl: 11     UNABLE TO FIND, Apply 0.25 tsp. topically 2 times daily MEDICATION NAME: Bio- estrol, plant based hormone replacement therapy , Disp: , Rfl:      UNCLASSIFIED OTC PRODUCT MISC, Calcium AEP 1-2 capsules by mouth daily  Supports cell membranes* TID, Disp: , Rfl:      UNCLASSIFIED OTC PRODUCT MISC, RelaxaMag  1 capsule at bedtime as needed -  a unique magnesium supplement that helps improve sleep and stress tolerance in the evening, Disp: , Rfl:      valACYclovir (VALTREX) 500 MG tablet, Take 1 tablet (500 mg) by mouth 2 times daily For 3 days when flares occur (Patient taking differently: Take 500 mg by mouth every morning For 3 days when flares occur), Disp: 30 tablet, Rfl: 3     VITAMIN C 500 MG OR TABS, 2  "tab daily AT BREAKFAST, Disp: , Rfl:   Allergies   Allergen Reactions     Dust Mite Extract      Escitalopram Unknown     Nausea and headache     Hydrocodone Nausea and Vomiting     n/v     Meperidine Nausea and Vomiting     vomiting  vomiting  vomiting     Mold      Trees      PMH, SOC HIST, FAM HIST, PROBLEM LIST:  All reviewed in EPIC.    OBJECTIVE:  /50 (BP Location: Left arm)  Pulse 62  Ht 1.664 m (5' 5.5\")  Wt 58.5 kg (129 lb)  SpO2 100%  BMI 21.14 kg/m2    Imaging:  These are the pertinent findings from:  Xrays taken today.  11/15/18  My impression: Overall alignment of the spine in 2 planes is satisfactory and unchanged from post op. Hardware is in good position without evidence failure.  Radiologist's report: Pending    Please see Epic for the bulk of the report.  I personally reviewed the images with the patient    EXAM:  Well developed well nourished female found seated comfortably in exam chair.  No apparent distress. She is accompanied.  A&O X3.  Mood and affect WNL. Language and fund of knowledge intact.  Is able to sit and rise independently.     Slight antalgia on the left.  Positive Sam's on the left, especially with internal rotation of the femur.  Tender bursal palpation on the left.  5/5 throughout the bilateral lower extremities.      Assessment/Plan:  1. Vitamin D deficiency    2. Flatback syndrome of thoracolumbar region    3. S/P lumbar fusion      Marcia Kauffman is doing well after her large lumbar reconstruction for flat back.  She has left hip joint pain, bursal pain.  It radiates to the buttock and groin.  And is worse with weightbearing, improved with offloading the joint.  She responded to a bursal injection for a week but I think this is more than just the bursa, I am going to refer her to orthopedics for evaluation and treatment.    She will return in 6 months to see Dr. Beckford, we will repeat x-rays at that juncture.  She will need to get her 25 OH today for the " 6-month postop visit.  She should continue her Forteo as prescribed by Dr. Rogers.  She will complete the 2-year cycle in July.    *  All the patient's questions have been answered and they demonstrate good understanding of the above.    *   The patient has our contact information and is aware that she should call if she has questions comments or concerns.     We appreciate the opportunity to be of service in the care of this pleasant patient.  Please do call if there is anything more we can do    Holli Ny PA-C  [facility]  Department of Neurosurgery  Phone: [phone]  Fax: [phone]    This note was generated using voice recognition software. While edited for content some inaccurate phrasing may be found.

## 2018-11-15 NOTE — MR AVS SNAPSHOT
After Visit Summary   11/15/2018    Marcia Kauffman    MRN: [id number]           Patient Information     Date Of Birth          1950        Visit Information        Provider Department      11/15/2018 10:30 AM Holli Ny PA-C [facility] Neurosurgery        Today's Diagnoses     Vitamin D deficiency    -  1    Flatback syndrome of thoracolumbar region        S/P lumbar fusion        Pain of left hip joint           Follow-ups after your visit        Additional Services     Orthopaedics Referral [0053]       Your provider has referred you to: [facility] - [place] [phone] [email or web address]    Please be aware that coverage of these services is subject to the terms and limitations of your health insurance plan.  Call member services at your health plan with any benefit or coverage questions.      Please bring the following to your appointment:    >>   Any x-rays, CTs or MRIs which have been performed.  Contact the facility where they were done to arrange for  prior to your scheduled appointment.    >>   List of current medications   >>   This referral request   >>   Any documents/labs given to you for this referral                  Your next 10 appointments already scheduled     Nov 15, 2018 12:00 PM CST   LAB with  LAB   [facility] ([facility])    [address] [place] [id number] [phone]           Please do not eat 10-12 hours before your appointment if you are coming in fasting for labs on lipids, cholesterol, or glucose (sugar). This does not apply to pregnant women. Water, hot tea and black coffee (with nothing added) are okay. Do not drink other fluids, diet soda or chew gum.            May 16, 2019 10:20 AM CDT   XR LUMBAR SPINE 2/3 VIEWS with UCXR1   [facility] ([facility])    [address] [place] [id number]    [phone]           How do I prepare for my exam? (Food and drink instructions) No Food and Drink Restrictions.  How do I prepare for my exam? (Other instructions) You do not need to do anything special for this exam.  What should I wear: Wear comfortable clothes.  How long does the exam take: Most scans take less than 5 minutes.  What should I bring: Bring a list of your medicines, including vitamins, minerals and over-the-counter drugs. It is safest to leave personal items at home.  Do I need a :  No  is needed.  What do I need to tell my doctor: Tell your doctor if there s any chance you are pregnant.  What should I do after the exam: No restrictions, You may resume normal activities.  What is this test: An image of a specific body part shown in shades of black and white.  Who should I call with questions: If you have any questions, please call the Imaging Department where you will have your exam. Directions, parking instructions, and other information is available on our website, [email or web address].            May 16, 2019 11:15 AM CDT   (Arrive by 11:00 AM)   Return Visit with Nanette Beckford MD   [facility]surgery ([facility])    [address] [address] [phone]              Future tests that were ordered for you today     Open Future Orders        Priority Expected Expires Ordered    X-ray Lumbar spine 2-3 views (AP and lateral - standing views preferred) [IMG66] Routine 11/15/2018 11/15/2019 11/15/2018    25 Hydroxyvitamin D2 and D3 Routine  11/16/2019 11/15/2018            Who to contact     Please call your clinic at [phone] to:    Ask questions about your health    Make or cancel appointments    Discuss your medicines    Learn about your test results    Speak to your doctor            Additional Information About Your Visit        [employer] [facility] Information     [employer] is an electronic gateway that provides  "easy, online access to your medical records. With [employer], you can request a clinic appointment, read your test results, renew a prescription or communicate with your care team.     To sign up for [employer] visit the website at [email or web address]   You will be asked to enter the access code listed below, as well as some personal information. Please follow the directions to create your username and password.     Your access code is: [email or web address]  Expires: 2019 11:52 AM     Your access code will  in 90 days. If you need help or a new code, please contact your [facility] or call [phone] for assistance.        Care EveryWhere ID     This is your Care EveryWhere ID. This could be used by other organizations to access your [place] medical records  [id number]        Your Vitals Were     Pulse Height Pulse Oximetry BMI (Body Mass Index)          62 1.664 m (5' 5.5\") 100% 21.14 kg/m2         Blood Pressure from Last 3 Encounters:   11/15/18 115/50   18 115/60   18 (!) 102/39    Weight from Last 3 Encounters:   11/15/18 58.5 kg (129 lb)   18 56.2 kg (124 lb)   18 56.7 kg (124 lb 14.4 oz)              We Performed the Following     Orthopaedics Referral [4541]          Today's Medication Changes          These changes are accurate as of 11/15/18 11:53 AM.  If you have any questions, ask your nurse or doctor.               These medicines have changed or have updated prescriptions.        Dose/Directions    montelukast 10 MG tablet   Commonly known as:  SINGULAIR   This may have changed:    - when to take this  - reasons to take this   Used for:  Allergic urticaria        Dose:  10 mg   Take 1 tablet (10 mg) by mouth daily   Quantity:  30 tablet   Refills:  11       valACYclovir 500 MG tablet   Commonly known as:  VALTREX   This may have changed:    - when to take this  - additional instructions   Used for:  Herpes simplex virus (HSV) infection     "    Dose:  500 mg   Take 1 tablet (500 mg) by mouth 2 times daily For 3 days when flares occur   Quantity:  30 tablet   Refills:  3                Primary Care Provider Office Phone # Fax #    Zhane Malgorzata Yost [phone]       [facility]  [facility] [address]        Equal Access to Services     : Hadii aad ku hadasho Soomaali, waaxda luqadaha, qaybta kaalmada adeegyada, dora dugan hayrenettan adecesar shafferdevenwilman bhatti . So [facility] [phone].    ATENCIÓN: Si habla español, tiene a montez disposición servicios gratuitos de asistencia lingüística. Geovanna al [phone].    We comply with applicable federal civil rights laws and [place] laws. We do not discriminate on the basis of race, color, national origin, age, disability, sex, sexual orientation, or gender identity.            Thank you!     Thank you for choosing [facility] NEUROSURGERY  for your care. Our goal is always to provide you with excellent care. Hearing back from our patients is one way we can continue to improve our services. Please take a few minutes to complete the written survey that you may receive in the mail after your visit with us. Thank you!             Your Updated Medication List - Protect others around you: Learn how to safely use, store and throw away your medicines at www.disposemymeds.org.          This list is accurate as of 11/15/18 11:53 AM.  Always use your most recent med list.                   Brand Name Dispense Instructions for use Diagnosis    apixaban ANTICOAGULANT 5 MG tablet    ELIQUIS     Take 5 mg by mouth        ascorbic acid 500 MG tablet    VITAMIN C     2 tab daily AT BREAKFAST        CALCIUM-VITAMIN D PO           CO Q 10 PO      Take 1 chew tab by mouth every morning        DAILY MULTI VITAMIN/MINERALS PO      1 TABLET DAILY AT BREAKFAST ON HOLD FOR SURGERY SINCE 05/01/2018        FLAX SEED OIL PO      takes in her cereal. STIOPPED 05/01/2018        glucosamine  chondroitin 500 complex Caps      1 capsule daily at noon        insulin pen needle 31G X 5 MM    B-D U/F    100 each    Use once daily as directed with Forteo    Osteoporosis, unspecified osteoporosis type, unspecified pathological fracture presence, History of lumbar fusion       magnesium citrate solution      Take 296 mLs by mouth At Bedtime        metoprolol succinate 25 MG 24 hr tablet    TOPROL-XL     Take 25 mg by mouth as needed        montelukast 10 MG tablet    SINGULAIR    30 tablet    Take 1 tablet (10 mg) by mouth daily    Allergic urticaria       Omega-3 Fish Oil 1200 MG Caps      Take 1,200 mg by mouth 2 times daily (with meals) ON HOLD FOR SURGERY SINCE 05/01/2018        PROBIOTIC PO      Take by mouth every other day        teriparatide (recombinant) 600 MCG/2.4ML Soln injection    FORTEO    2.4 mL    Inject 0.08 mLs (20 mcg) Subcutaneous daily    Osteoporosis, unspecified osteoporosis type, unspecified pathological fracture presence, History of lumbar fusion       TYLENOL PO           UNABLE TO FIND      Apply 0.25 tsp. topically 2 times daily MEDICATION NAME: Bio- estrol, plant based hormone replacement therapy        * UNCLASSIFIED OTC PRODUCT Misc      Calcium AEP 1-2 capsules by mouth daily ?Supports cell membranes* TID        * UNCLASSIFIED OTC PRODUCT Misc      RelaxaMag? 1 capsule at bedtime as needed -  a unique magnesium supplement that helps improve sleep and stress tolerance in the evening        valACYclovir 500 MG tablet    VALTREX    30 tablet    Take 1 tablet (500 mg) by mouth 2 times daily For 3 days when flares occur    Herpes simplex virus (HSV) infection       * Notice:  This list has 2 medication(s) that are the same as other medications prescribed for you. Read the directions carefully, and ask your doctor or other care provider to review them with you.

## 2018-11-15 NOTE — LETTER
11/15/2018       RE: Marcia Kauffman  [id number]  [facility] [address]     Dear Colleague,    Thank you for referring your patient, Marcia Kauffman, to the [facility] NEUROSURGERY at [facility]. Please see a copy of my visit note below.      Neurosurgery clinic post op   Date of visit: 11/15/18         Procedure:  05/14/2018  SAGAR Rowe   DIAGNOSES:     1.  Flat back syndrome.    2.  Previous L4-L5 posterior decompression and posterior lateral instrumented fusion.   3.  Osteoporosis.       PROCEDURE PERFORMED:   1.  Stealth-guided L2 through sacrum posterior lateral instrumented fusion with local harvest autograft and allograft.   2.  Removal and replacement of bilateral L4-L5 instrumentation.   3.  Pelvic fixation with S2 alar iliac screws.   4.  Bilateral transforaminal lumbar interbody fusion L2-L3, L3-L4, L4-L5.   5.  Bilateral Oshea-Limon osteotomies for deformity correction, bilateral L2-L3, L3-L4 and L4-L5.     INDICATIONS FOR SURGERY:  Marcia Kauffman is a 67-year-old woman who presented with a prior posterior lateral instrumented fusion at L4-L5 and transitional segment anatomy with fused disk spaces at L5-S1 and rudimentary S1-S2 disc space.  She had a pelvic incidence of 62 degrees and a lumbar lordosis of 36 degrees for significant pelvic incidence to lumbar lordosis mismatch.  The patient had symptomatic standing and ambulation intolerance with severe back pain.  She was counseled as to risks versus benefits of revision posterior fusion with potential extension up into the thoracic spine versus potential for correction being obtained solely in the lumbar spine.  After a full discussion of risks versus benefits, she elected to proceed.  Of note, she was diagnosed with osteoporosis and was maintained on teriparatide for several months prior to proceeding with surgery in order to improve her bone quality.       A 2 surgeon approach was  used to minimize operative blood loss and operative time given the complex deformity correction required.   HPI:  She is now 6 months post op.  She is on Forteo for osteoporosis,   At last visit she was walking 3 miles per day.  But she was having some left leg pain.  A left bursal injection was ordered.  That gave her some relief for about a week.  When her pain returned it appeared in the buttock and in the groin.  She has pain when weightbearing, it is eased when offloading the joint.  Every once in a while it goes all the way to the lower leg, her chiropractor thinks it may be piriformis.   She has been weaned out of the brace.  She had x-rays today, but vitamin D was not checked today.  There was a miscommunication.    August 16 vitamin D.  25 OH Vit D2 <5   ug/L Final 08/16/2018  1:27 PM 51   25 OH Vit D3 52   ug/L Final 08/16/2018  1:27 PM 51   25 OH Vit D total <57  20 - 75 ug/L Final 08/16/2018  1:27 PM 51   Comment:       Patient Supplied Answers To the [facility] Pain Questionnaire  [facility] Pain -  Patient Entered Questionnaire/Answers 8/7/2018   What number best describes your pain right now:  0 = No pain  to  10 = Worst pain imaginable 4   How would you describe the pain? numbness, dull, aching   Which of the following worsen your pain? standing, sitting, walking   Which of the following improve or reduce your pain?  lying down, walking, exercise, medication, relaxation   What number best describes your average pain for the past week:  0 = No pain  to  10 = Worst pain imaginable 5   What number best describes your LOWEST pain in past 24 hours:  0 = No pain  to  10 = Worst pain imaginable 3   What number best describes your WORST pain in past 24 hours:  0 = No pain  to  10 = Worst pain imaginable 7   When is your pain worst? PM, Night   What non-medicine treatments have you already had for your pain? physical therapy, acupuncture, chiropractic care, TENS (electrical stimulator), surgery, exercise   Have you tried  treating your pain with medication?  Yes   Are you currently taking medications for your pain? Yes            Current Outpatient Prescriptions:      Acetaminophen (TYLENOL PO), , Disp: , Rfl:      apixaban ANTICOAGULANT (ELIQUIS) 5 MG tablet, Take 5 mg by mouth, Disp: , Rfl:      CALCIUM-VITAMIN D PO, , Disp: , Rfl:      Coenzyme Q10 (CO Q 10 PO), Take 1 chew tab by mouth every morning , Disp: , Rfl:      DAILY MULTI VITAMIN/MINERALS OR, 1 TABLET DAILY AT BREAKFAST ON HOLD FOR SURGERY SINCE 05/01/2018, Disp: , Rfl:      FLAX SEED OIL OR, takes in her cereal. STIOPPED 05/01/2018, Disp: , Rfl:      GLUCOSAMINE CHONDR 500 COMPLEX OR CAPS, 1 capsule daily at noon, Disp: , Rfl:      insulin pen needle (B-D U/F) 31G X 5 MM, Use once daily as directed with Forteo, Disp: 100 each, Rfl: 3     magnesium citrate solution, Take 296 mLs by mouth At Bedtime, Disp: , Rfl:      metoprolol succinate (TOPROL-XL) 25 MG 24 hr tablet, Take 25 mg by mouth as needed, Disp: , Rfl:      montelukast (SINGULAIR) 10 MG tablet, Take 1 tablet (10 mg) by mouth daily (Patient taking differently: Take 10 mg by mouth daily as needed ), Disp: 30 tablet, Rfl: 11     Omega-3 Fatty Acids (OMEGA-3 FISH OIL) 1200 MG CAPS, Take 1,200 mg by mouth 2 times daily (with meals) ON HOLD FOR SURGERY SINCE 05/01/2018, Disp: , Rfl:      Probiotic Product (PROBIOTIC PO), Take by mouth every other day, Disp: , Rfl:      teriparatide, recombinant, (FORTEO) 600 MCG/2.4ML SOLN injection, Inject 0.08 mLs (20 mcg) Subcutaneous daily, Disp: 2.4 mL, Rfl: 11     UNABLE TO FIND, Apply 0.25 tsp. topically 2 times daily MEDICATION NAME: Bio- estrol, plant based hormone replacement therapy , Disp: , Rfl:      UNCLASSIFIED OTC PRODUCT MISC, Calcium AEP 1-2 capsules by mouth daily  Supports cell membranes* TID, Disp: , Rfl:      UNCLASSIFIED OTC PRODUCT MISC, RelaxaMag  1 capsule at bedtime as needed -  a unique magnesium supplement that helps improve sleep and stress tolerance in  "the evening, Disp: , Rfl:      valACYclovir (VALTREX) 500 MG tablet, Take 1 tablet (500 mg) by mouth 2 times daily For 3 days when flares occur (Patient taking differently: Take 500 mg by mouth every morning For 3 days when flares occur), Disp: 30 tablet, Rfl: 3     VITAMIN C 500 MG OR TABS, 2 tab daily AT BREAKFAST, Disp: , Rfl:   Allergies   Allergen Reactions     Dust Mite Extract      Escitalopram Unknown     Nausea and headache     Hydrocodone Nausea and Vomiting     n/v     Meperidine Nausea and Vomiting     vomiting  vomiting  vomiting     Mold      Trees      PMH, SOC HIST, FAM HIST, PROBLEM LIST:  All reviewed in EPIC.    OBJECTIVE:  /50 (BP Location: Left arm)  Pulse 62  Ht 1.664 m (5' 5.5\")  Wt 58.5 kg (129 lb)  SpO2 100%  BMI 21.14 kg/m2    Imaging:  These are the pertinent findings from:  Xrays taken today.  11/15/18  My impression: Overall alignment of the spine in 2 planes is satisfactory and unchanged from post op. Hardware is in good position without evidence failure.  Radiologist's report: Pending    Please see Epic for the bulk of the report.  I personally reviewed the images with the patient    EXAM:  Well developed well nourished female found seated comfortably in exam chair.  No apparent distress. She is accompanied.  A&O X3.  Mood and affect WNL. Language and fund of knowledge intact.  Is able to sit and rise independently.     Slight antalgia on the left.  Positive Sam's on the left, especially with internal rotation of the femur.  Tender bursal palpation on the left.  5/5 throughout the bilateral lower extremities.      Assessment/Plan:  1. Vitamin D deficiency    2. Flatback syndrome of thoracolumbar region    3. S/P lumbar fusion      Marcia Kauffman is doing well after her large lumbar reconstruction for flat back.  She has left hip joint pain, bursal pain.  It radiates to the buttock and groin.  And is worse with weightbearing, improved with offloading the joint.  She " responded to a bursal injection for a week but I think this is more than just the bursa, I am going to refer her to orthopedics for evaluation and treatment.    She will return in 6 months to see Dr. Beckford, we will repeat x-rays at that juncture.  She will need to get her 25 OH today for the 6-month postop visit.  She should continue her Forteo as prescribed by Dr. Rogers.  She will complete the 2-year cycle in July.    *  All the patient's questions have been answered and they demonstrate good understanding of the above.    *   The patient has our contact information and is aware that she should call if she has questions comments or concerns.     We appreciate the opportunity to be of service in the care of this pleasant patient.  Please do call if there is anything more we can do    Holli Ny PA-C  [facility]  Department of Neurosurgery  Phone: [phone]  Fax: [phone]    This note was generated using voice recognition software. While edited for content some inaccurate phrasing may be found.

## 2018-11-19 LAB
DEPRECATED CALCIDIOL+CALCIFEROL SERPL-MC: <60 UG/L (ref 20–75)
VITAMIN D2 SERPL-MCNC: <5 UG/L
VITAMIN D3 SERPL-MCNC: 55 UG/L

## 2019-05-10 DIAGNOSIS — M40.30 FLATBACK SYNDROME: Primary | ICD-10-CM

## 2019-05-16 ENCOUNTER — ANCILLARY PROCEDURE (OUTPATIENT)
Dept: GENERAL RADIOLOGY | Facility: CLINIC | Age: 69
End: 2019-05-16
Attending: NEUROLOGICAL SURGERY
Payer: COMMERCIAL

## 2019-05-16 ENCOUNTER — ANCILLARY PROCEDURE (OUTPATIENT)
Dept: GENERAL RADIOLOGY | Facility: CLINIC | Age: 69
End: 2019-05-16
Attending: PHYSICIAN ASSISTANT
Payer: COMMERCIAL

## 2019-05-16 ENCOUNTER — CARE COORDINATION (OUTPATIENT)
Dept: NEUROSURGERY | Facility: CLINIC | Age: 69
End: 2019-05-16

## 2019-05-16 ENCOUNTER — OFFICE VISIT (OUTPATIENT)
Dept: NEUROSURGERY | Facility: CLINIC | Age: 69
End: 2019-05-16
Payer: COMMERCIAL

## 2019-05-16 VITALS
HEART RATE: 64 BPM | SYSTOLIC BLOOD PRESSURE: 120 MMHG | DIASTOLIC BLOOD PRESSURE: 41 MMHG | WEIGHT: 129.8 LBS | RESPIRATION RATE: 16 BRPM | TEMPERATURE: 98 F | BODY MASS INDEX: 20.86 KG/M2 | OXYGEN SATURATION: 98 % | HEIGHT: 66 IN

## 2019-05-16 DIAGNOSIS — M16.12 ARTHRITIS OF LEFT HIP: Primary | ICD-10-CM

## 2019-05-16 DIAGNOSIS — Z98.1 S/P LUMBAR FUSION: ICD-10-CM

## 2019-05-16 DIAGNOSIS — M40.35 FLATBACK SYNDROME OF THORACOLUMBAR REGION: ICD-10-CM

## 2019-05-16 DIAGNOSIS — M40.30 FLATBACK SYNDROME: ICD-10-CM

## 2019-05-16 ASSESSMENT — ENCOUNTER SYMPTOMS
FLANK PAIN: 0
MEMORY LOSS: 0
NAIL CHANGES: 0
ARTHRALGIAS: 1
SYNCOPE: 0
LOSS OF CONSCIOUSNESS: 0
NIGHT SWEATS: 0
ORTHOPNEA: 0
POOR WOUND HEALING: 0
CHILLS: 0
EXERCISE INTOLERANCE: 0
DIFFICULTY URINATING: 0
POLYPHAGIA: 0
WEAKNESS: 0
HYPOTENSION: 0
LIGHT-HEADEDNESS: 0
EYE PAIN: 0
HEADACHES: 0
MUSCLE CRAMPS: 1
DISTURBANCES IN COORDINATION: 0
WEIGHT GAIN: 0
MYALGIAS: 1
JOINT SWELLING: 1
TINGLING: 0
HEMATURIA: 0
NUMBNESS: 0
MUSCLE WEAKNESS: 1
STIFFNESS: 1
NECK PAIN: 1
PARALYSIS: 0
SKIN CHANGES: 0
SPEECH CHANGE: 0
HALLUCINATIONS: 0
FEVER: 0
INCREASED ENERGY: 0
EYE IRRITATION: 1
TREMORS: 0
LEG PAIN: 1
HYPERTENSION: 0
WEIGHT LOSS: 0
EYE REDNESS: 0
DOUBLE VISION: 0
DYSURIA: 0
PALPITATIONS: 1
DECREASED APPETITE: 0
ALTERED TEMPERATURE REGULATION: 0
FATIGUE: 0
DIZZINESS: 0
SLEEP DISTURBANCES DUE TO BREATHING: 0
SEIZURES: 0
BACK PAIN: 1
EYE WATERING: 0
POLYDIPSIA: 0

## 2019-05-16 ASSESSMENT — MIFFLIN-ST. JEOR: SCORE: 1117.77

## 2019-05-16 ASSESSMENT — PAIN SCALES - GENERAL: PAINLEVEL: MILD PAIN (3)

## 2019-05-16 NOTE — PATIENT INSTRUCTIONS
Return to clinic in 1 year with xrays prior to appointment.    Referral to Dr. Claudy Nixon, orthopedics, for left hip arthritis

## 2019-05-16 NOTE — LETTER
5/16/2019       RE: Marcia Kauffman  [id number]  [facility] [id number]     Dear Colleague,    Thank you for referring your patient, Marcia Kauffman, to the [facility] NEUROSURGERY at [facility]. Please see a copy of my visit note below.    Neurosurgery Clinic Note     Chief Complaint: back pain, standing intolerance     History of Present Illness:  It was a pleasure to evaluate Marcia Kauffman in clinic today in scheduled postop followup s/p complex spinal deformity correction surgery on 5/14/18 by Dr. Beckford and Dr. Aldana for:     Lumbar 2 To Sacral 1 Posterior Instrumented Fusion, Pelvic Fixation, Revision Of Lumbar 4-5 Instrumentation, Transforaminal Interbody Fusion with bone morphogenic protein, Oshea-Limon Osteotomies,  Lumbar 2-3, 3-4, 4-5    Marcia Kauffman is a 67 year old female initially presenting with low back pain and standing intolerance. Her pain is exacerbated by standing and walking and relieved minorly by root rest. She's had no significant relief with PT. She has bilateral leg aching but no radiating leg pain. Her right foot feels somewhat weak and she often trips on that foot.     She had a workup of left thigh numbness including EMG at the [facility] showing neuralgia paresthetica and she does have a tender spot or her inguinal crease on the left reproduce her pain. I previously had evaluated her while at [facility] 4 lumbar flat back deformity with significant pelvic incidence to lumbar lordosis mismatch of greater than 30 . She has a fixed deformity of her spine with no flexibility in supine imaging. 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 Leo Dixon. Likely as a secondary consequence of her sagittal spine malalignment. She also has right SI joint pain     She has long-standing neck pain exacerbated by neck  extension that has been evaluated with MRI of her cervical spine does not show significant cord compression. She is aware that because of her neck pain and her resuscitation of laying prone for a long surgery to correct her spinal deformity, her neck pain might worsen. Next     The patient has osteoporosis with a T score of -3.3 and her radius on DEXA scan in June 2017. She was started on Forteo in July 2017 and has continued that. We will be continuing that for the full 24 month duration.    Past Medical History        Past Medical History:   Diagnosis Date     Endometriosis, site unspecified       Herpes simplex without mention of complication       HSV 2     MEDICAL HISTORY OF -       L4/5 spondololisthesis     PONV (postoperative nausea and vomiting)       Postmenopausal atrophic vaginitis 2004     Estring vag ring-stopped     Premature ventricular contractions 2007     Radial styloid tenosynovitis       arthritis in back and wrists     Spinal stenosis, lumbar region, without neurogenic claudication       Symptomatic menopausal or female climacteric states       prev on HRT-orthoprefest, effexor, paxil            Past Surgical History:   Procedure Laterality Date     ARTHROSCOPY KNEE RT/LT  1/2010    Right knee, torn meniscus     BREAST LUMPECTOMY, RT/LT  3/1991    Right benign lump     CHOLECYSTECTOMY, OPEN  1981     COLONOSCOPY  2016     FUSION SPINE POSTERIOR TWO LEVELS Left 10/20/2015    Procedure: FUSION SPINE POSTERIOR TWO LEVELS;  Surgeon: Leo Dixon MD;  Location: [facility] OR     H ABLATION PVC  2007     HC EXCIS TENDON SHEATH LESION FOREARM/WRIST  2006    treatment for frozen thumb     HYSTEROSCOPY  1987     LAPAROSCOPY DIAGNOSTIC (GYN)  1984    3 total scopes for fertility     OPTICAL TRACKING SYSTEM FUSION SPINE POSTERIOR LUMBAR THREE+ LEVELS N/A 5/14/2018    Procedure: OPTICAL TRACKING SYSTEM FUSION SPINE POSTERIOR LUMBAR THREE+ LEVELS;  O-Arm/Stealth Assisted Lumbar 2 To Sacral 1 Posterior  Instrumented Fusion, Pelvic Fixation, Revision Of Lumbar 4-5 Instrumentation, Transforaminal Interbody Fusion with bone morphogenic protein, Oshea-Limon Osteotomies,  Lumbar 2-3, 3-4, 4-5;  Surgeon: Nanette Beckford MD;  Location:  OR     SURGICAL HISTORY OF -   09/14/95    Excision of cyst on back           Social History            Social History     Marital status:        Spouse name: N/A     Number of children: 0     Years of education: N/A            Occupational History       [employer]       nurse assistant       Retired              Social History Main Topics      Smoking status: Never Smoker      Smokeless tobacco: Never Used      Alcohol use 0.0 - 0.6 oz/week       0 - 1 Standard drinks or equivalent per week         Comment: rare      Drug use: No      Sexual activity: No            Other Topics Concern     Parent/Sibling W/ Cabg, Mi Or Angioplasty Before 65f 55m? No      Social History Narrative         family history includes Arthritis in her father, mother, and sister; Blood Disease in her paternal grandfather; Breast Cancer in her mother; CEREBROVASCULAR DISEASE in her mother; DIABETES in her father; Eye Disorder in her mother; HEART DISEASE in her mother. There is no history of Family History Negative.         IMAGING per my own measurement and interpretation:  xrays long cassette/MRI lumbar     Pelvic incidence of 65 , lumbar lordosis of 29 , grade 1 L3-4 spondylolisthesis, prior laminectomy/pedicle screws at L4-5 with halo formation, right L2-3 disc herniation with right lateral recess stenosis and central stenosis  Standing with pelvic retroversion and knee/hip flexion to compensate for flatback deformity and achieve upward stance  Pelvic tilt 39 degrees    Postop xrays 05/16/19            Mr Lumbar Spine W/o & W Contrast     Result Date: 6/8/2017  MR LUMBAR SPINE WITHOUT AND WITH CONTRAST  6/8/2017 11:13 AM HISTORY: Dorsalgia, unspecified. Low back pain and left leg pain. Surgery  in October 2015. COMPARISON: July 2015. TECHNIQUE:  Sagittal and axial T1 with and without contrast. 5 mL Gadavist.  Sagittal T2 and axial T2. Sagittal STIR. FINDINGS:  Five lumbar vertebrae are assumed. Again there is a transitional vertebral body which will be treated as S1 to facilitate comparison. Rudimentary disc at S1-S2. There are postsurgical changes at L4-L5, including laminectomy and posterior robert and screw fixation with metallic artifact. There is underlying grade 2 spondylolisthesis at L4-L5. There is grade 1 spondylolisthesis at L3-L4. Slight scoliosis. Small atypical hemangioma within L2 again seen. Findings by specific level: There is facet degenerative change as follows: Mild bilateral L1-L2 and L2-L3, mild/moderate bilateral L3-L4, mild left L5-S1, right L5-S1 with ankylosis. L1-L2: Mild ligamentum flavum thickening. Slight disc bulging. Borderline central stenosis and no significant foraminal stenosis. L2-L3: Mild ligamentum flavum thickening. There is a mild broad-based disc protrusion extending from right parasagittal to right foraminal. There is some mass effect on the right L3 nerve root. There is disc bulging elsewhere and mild-moderate central stenosis. Mild bilateral foraminal stenosis. L3-L4: Disc bulging without significant overall central stenosis. The left neural foramen appears adequate. There is mild-moderate right foraminal stenosis. L4-L5: Postsurgical changes as above. No significant central stenosis. The neuroforamina appear adequate. L5-S1: Mild disc bulging. Partial anterior fusion. No stenosis. Compared with the previous exam, the L2-L3 disc protrusion and central stenosis are increased, the L3-L4 central stenosis is decreased, the L4-L5 central stenosis is decreased.      IMPRESSION: 1. Multilevel degenerative disc and facet disease. 2. Interval surgery at L4-L5. 3. L1-L2: Borderline central stenosis. 4. L2-L3: Mild right asymmetric disc protrusion with mass effect on the L3  nerve root. Mild/moderate central stenosis. JOSE CRUZ SILVA MD     Xr Spine Complete 2 Views     Result Date: 12/21/2017  Exam: Full body radiographs using EOS History: Flat back syndrome, acquired Techniques: AP and lateral images of full body and secondary images of AP and lateral views of spine were submitted for interpretation. Comparison: 7/12/2017 radiographs; 6/8/2017 lumbar spine MRI. Findings: 12 rib bearing vertebral bodies and 5 lumbar type vertebral bodies are identified. Postsurgical changes of spinal fusion instrumentation at L4-5 with grade 1 anterolisthesis, similar to prior. There is moderate to severe adjacent level degenerative disc change at the L3-L4 with associated anterolisthesis, also similar to prior. Mild reversal of the normal cervical lordosis with trace anterolisthesis of C3 on C4. Degenerative changes of cervical spine.. Coronal Deformity: There is a no substantial  coronal plane curvature of the spine. No substantial global coronal imbalance. Sagittal Vertical Axis (A vertical line drawn from the center of C7 (farheen line) to the posterosuperior aspect of the S1 on sagittal plane):  positive Weight bearing axis: (Defined as a line drawn from the center of the femoral head to the mid aspect of the tibial plafond).     Right: Weight bearing axis crosses central 1/3 of lateral tibial plateau.      Left: Weight bearing axis crosses between tibial spines. Leg length:  (Measured from the top of the femoral head to the center of tibial plafond.  It is assumed joints are in similar degrees of extension bilaterally.  Significant difference is defined when discrepancy is greater than 1.5 cm).       No significant  leg length discrepancy. Additional Findings: Mild to moderate lateral compartment joint space narrowing in the right knee joint. Pelvic enthesopathic change. No acute osseous abnormality.  There is a nonobstructive bowel gas pattern.      Impression: 1. Stable postsurgical changes of spinal  fusion at L4-5 with associated grade 1 anterolisthesis with moderate to severe adjacent level degenerative disc change and associated grade 1 anterolisthesis at L3-4. 2. Positive global sagittal imbalance. 4. Weight bearing axis as detailed above. I have personally reviewed the examination and initial interpretation and I agree with the findings. RAMONE ADRIANO        Vitamin D:  25 OH Vit D2   Date Value Ref Range Status   11/15/2018 <5 ug/L Final   08/16/2018 <5 ug/L Final   06/20/2018 <5 ug/L Final     25 OH Vit D3   Date Value Ref Range Status   11/15/2018 55 ug/L Final   08/16/2018 52 ug/L Final   06/20/2018 57 ug/L Final     25 OH Vit D total   Date Value Ref Range Status   11/15/2018 <60 20 - 75 ug/L Final     Comment:     Season, race, dietary intake, and treatment affect the concentration of   25-hydroxy-Vitamin D. Values may decrease during winter months and increase   during summer months. Values 20-29 ug/L may indicate Vitamin D insufficiency   and values <20 ug/L may indicate Vitamin D deficiency.  This test was developed and its performance characteristics determined by the   [facility],  Special Chemistry Laboratory. It has   not been cleared or approved by the FDA. The laboratory is regulated under   CLIA as qualified to perform high-complexity testing. This test is used for   clinical purposes. It should not be regarded as investigational or for   research.     08/16/2018 <57 20 - 75 ug/L Final     Comment:     Season, race, dietary intake, and treatment affect the concentration of   25-hydroxy-Vitamin D. Values may decrease during winter months and increase   during summer months. Values 20-29 ug/L may indicate Vitamin D insufficiency   and values <20 ug/L may indicate Vitamin D deficiency.  This test was developed and its performance characteristics determined by the   [facility],  Special Chemistry Laboratory. It has   not been cleared or  "approved by the FDA. The laboratory is regulated under   CLIA as qualified to perform high-complexity testing. This test is used for   clinical purposes. It should not be regarded as investigational or for   research.     06/20/2018 <62 20 - 75 ug/L Final     Comment:     Season, race, dietary intake, and treatment affect the concentration of   25-hydroxy-Vitamin D. Values may decrease during winter months and increase   during summer months. Values 20-29 ug/L may indicate Vitamin D insufficiency   and values <20 ug/L may indicate Vitamin D deficiency.  This test was developed and its performance characteristics determined by the   [facility],  Special Chemistry Laboratory. It has   not been cleared or approved by the FDA. The laboratory is regulated under   CLIA as qualified to perform high-complexity testing. This test is used for   clinical purposes. It should not be regarded as investigational or for   research.                  Nutritional Status:  Body mass index is 21.26 kg/m .           Lab Results   Component Value Date     ALBUMIN 3.7 10/12/2015         Diabetes Screening:        Lab Results   Component Value Date     A1C 5.5 01/11/2013         Nicotine Usage:     No      Physical Exam   Body mass index is 21.26 kg/m .  /41 (BP Location: Left arm, Patient Position: Sitting, Cuff Size: Adult Regular)   Pulse 64   Temp 98  F (36.7  C) (Oral)   Resp 16   Ht 1.664 m (5' 5.51\")   Wt 58.9 kg (129 lb 12.8 oz)   SpO2 98%   BMI 21.26 kg/m       Constitutional: Oriented to person, place, and time. Appears well-developed and well-nourished. Cooperative. No distress.   HENT:   Head: Normocephalic and atraumatic.   Eyes: Conjunctivae are normal.  Neck: Normal range of motion. Neck supple. No spinous process tenderness and no muscular tenderness present. No tracheal deviation present.  Cardiovascular: Normal rate and regular rhythm.    Pulmonary/Chest: Effort normal and breath " sounds normal.  Abdominal: Soft. Bowel sounds are normal. Exhibits no distension. There is no tenderness.   Musculoskeletal:   Cervical flexion-extension range of motion: pain with extension greater than 15 degrees  Lumbar flexion/extension range of motion: limited due to pain    Neurological: alert and oriented to person, place, and time. No cranial nerve deficit   Decreased sensation webbing right great toe     Reflex Scores:           Bicep reflexes are 3+ on the right side and 3+ on the left side.       Brachioradialis reflexes are 3+ on the right side and 3+ on the left side.       Patellar reflexes are 1+ on the right side and 1+ on the left side.       Achilles reflexes are 1+ on the right side and 1+ on the left side.     STRENGTH LEFT RIGHT   Deltoid 5 5   Bicep 5 5   Wrist Extensor 5 5   Tricep 5 5   Finger flexion 5 5   Finger abduction 5 5    5 5         Hip Flexion       5       5   Knee Extension 5 5   Ankle Dorsiflexion 5 4   Extensor Hallucis Longus 5 3   Plantar Flexion 5 5   Foot eversion 5 5   Foot inversion 5 5     No Lhermitte's, No Spurling's  No Radha's   No ankle clonus  Able to tandem walk     Skin: Skin is warm, dry and intact.   Psychiatric: Normal mood and affect. Speech is normal and behavior is normal.     ASSESSMENT:  Marcia Kauffman is a 67 year old female one year s/p L2-pelvis posterior fusion for flatback deformity    PLAN:  Doing well at one year postop, some adjacent segment disc degeneration at L1-2 not symptomatic  Has left hip pain in lateral thigh, radiating to groin, occasionally to anterior thigh- had previous diagnosis of meralgia paresthetica but now has some reproduction of pain with external femoral rotation and is interested in another opinion on hip management, we recommended Dr. Claudy Nixon and placed a referral to him for left hip pain. I explained to Marcia that Dr. Nixon is very thoughtful about hip pain and whatever he recommends, we will  do.    followup for spine in one year with EOS xrays  Complete 24 months of Forteo therapy and followup with Dr. Rogers as scheduled previously    Nanette Beckford MD    [facility] Department of Neurosurgery  Complex Spinal Deformity, Scoliosis, and Minimally Invasive Spine Surgery Specialist  Office: [phone]

## 2019-05-16 NOTE — PROGRESS NOTES
Neurosurgery Clinic Note     Chief Complaint: back pain, standing intolerance     History of Present Illness:  It was a pleasure to evaluate Marcia Kauffman in clinic today in scheduled postop followup s/p complex spinal deformity correction surgery on 5/14/18 by Dr. Beckford and Dr. Aldana for:     Lumbar 2 To Sacral 1 Posterior Instrumented Fusion, Pelvic Fixation, Revision Of Lumbar 4-5 Instrumentation, Transforaminal Interbody Fusion with bone morphogenic protein, Oshea-Limon Osteotomies,  Lumbar 2-3, 3-4, 4-5         Marcia Kauffman is a 67 year old female initially presenting with low back pain and standing intolerance. Her pain is exacerbated by standing and walking and relieved minorly by root rest. She's had no significant relief with PT. She has bilateral leg aching but no radiating leg pain. Her right foot feels somewhat weak and she often trips on that foot.     She had a workup of left thigh numbness including EMG at the [facility] showing neuralgia paresthetica and she does have a tender spot or her inguinal crease on the left reproduce her pain. I previously had evaluated her while at [facility] 4 lumbar flat back deformity with significant pelvic incidence to lumbar lordosis mismatch of greater than 30 . She has a fixed deformity of her spine with no flexibility in supine imaging. 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 Leo Dixon. Likely as a secondary consequence of her sagittal spine malalignment. She also has right SI joint pain     She has long-standing neck pain exacerbated by neck extension that has been evaluated with MRI of her cervical spine does not show significant cord compression. She is aware that because of her neck pain and her resuscitation of laying prone for a long surgery to correct her spinal deformity, her neck pain might worsen. Next     The  patient has osteoporosis with a T score of -3.3 and her radius on DEXA scan in June 2017. She was started on Forteo in July 2017 and has continued that. We will be continuing that for the full 24 month duration.           Review of Systems   Answers for HPI/ROS submitted by the patient on 5/16/2019   General Symptoms: Yes  Skin Symptoms: Yes  HENT Symptoms: No  EYE SYMPTOMS: Yes  HEART SYMPTOMS: Yes  LUNG SYMPTOMS: No  INTESTINAL SYMPTOMS: No  URINARY SYMPTOMS: Yes  GYNECOLOGIC SYMPTOMS: No  BREAST SYMPTOMS: No  SKELETAL SYMPTOMS: Yes  BLOOD SYMPTOMS: No  NERVOUS SYSTEM SYMPTOMS: Yes  MENTAL HEALTH SYMPTOMS: No  Fever: No  Loss of appetite: No  Weight loss: No  Weight gain: No  Fatigue: No  Night sweats: No  Chills: No  Increased stress: No  Excessive hunger: No  Excessive thirst: No  Feeling hot or cold when others believe the temperature is normal: No  Loss of height: No  Post-operative complications: No  Surgical site pain: Yes  Hallucinations: No  Change in or Loss of Energy: No  Hyperactivity: No  Confusion: No  Changes in hair: No  Changes in moles/birth marks: No  Itching: No  Rashes: No  Changes in nails: No  Hair in places you don't want it: No  Change in facial hair: No  Warts: No  Non-healing sores: No  Scarring: No  Flaking of skin: No  Color changes of hands/feet in cold : No  Sun sensitivity: No  Skin thickening: No  Eye pain: No  Vision loss: No  Dry eyes: Yes  Watery eyes: No  Eye bulging: No  Double vision: No  Flashing of lights: No  Spots: No  Floaters: Yes  Redness: No  Crossed eyes: No  Tunnel Vision: No  Yellowing of eyes: No  Eye irritation: Yes  Chest pain or pressure: No  Fast or irregular heartbeat: Yes  Pain in legs with walking: Yes  Trouble breathing while lying down: No  Fingers or toes appear blue: No  High blood pressure: No  Low blood pressure: No  Fainting: No  Murmurs: Yes  Pacemaker: No  Varicose veins: No  Edema or swelling: Yes  Wake up at night with shortness of breath:  No  Light-headedness: No  Exercise intolerance: No  Trouble holding urine or incontinence: No  Pain or burning: No  Trouble starting or stopping: No  Increased frequency of urination: No  Blood in urine: No  Decreased frequency of urination: No  Frequent nighttime urination: No  Flank pain: No  Difficulty emptying bladder: No  Back pain: Yes  Muscle aches: Yes  Neck pain: Yes  Swollen joints: Yes  Joint pain: Yes  Bone pain: Yes  Muscle cramps: Yes  Muscle weakness: Yes  Joint stiffness: Yes  Bone fracture: No  Trouble with coordination: No  Dizziness or trouble with balance: No  Fainting or black-out spells: No  Memory loss: No  Headache: No  Seizures: No  Speech problems: No  Tingling: No  Tremor: No  Weakness: No  Difficulty walking: No  Paralysis: No  Numbness: No       Past Medical History        Past Medical History:   Diagnosis Date     Endometriosis, site unspecified       Herpes simplex without mention of complication       HSV 2     MEDICAL HISTORY OF -       L4/5 spondololisthesis     PONV (postoperative nausea and vomiting)       Postmenopausal atrophic vaginitis 2004     Estring vag ring-stopped     Premature ventricular contractions 2007     Radial styloid tenosynovitis       arthritis in back and wrists     Spinal stenosis, lumbar region, without neurogenic claudication       Symptomatic menopausal or female climacteric states       prev on HRT-orthoprefest, effexor, paxil            Past Surgical History:   Procedure Laterality Date     ARTHROSCOPY KNEE RT/LT  1/2010    Right knee, torn meniscus     BREAST LUMPECTOMY, RT/LT  3/1991    Right benign lump     CHOLECYSTECTOMY, OPEN  1981     COLONOSCOPY  2016     FUSION SPINE POSTERIOR TWO LEVELS Left 10/20/2015    Procedure: FUSION SPINE POSTERIOR TWO LEVELS;  Surgeon: Leo Dixon MD;  Location: [facility] OR     H ABLATION PVC  2007     HC EXCIS TENDON SHEATH LESION FOREARM/WRIST  2006    treatment for frozen thumb     HYSTEROSCOPY  1987      LAPAROSCOPY DIAGNOSTIC (GYN)  1984    3 total scopes for fertility     OPTICAL TRACKING SYSTEM FUSION SPINE POSTERIOR LUMBAR THREE+ LEVELS N/A 5/14/2018    Procedure: OPTICAL TRACKING SYSTEM FUSION SPINE POSTERIOR LUMBAR THREE+ LEVELS;  O-Arm/Stealth Assisted Lumbar 2 To Sacral 1 Posterior Instrumented Fusion, Pelvic Fixation, Revision Of Lumbar 4-5 Instrumentation, Transforaminal Interbody Fusion with bone morphogenic protein, Oshea-Limon Osteotomies,  Lumbar 2-3, 3-4, 4-5;  Surgeon: Nanette Beckford MD;  Location:  OR     SURGICAL HISTORY OF -   09/14/95    Excision of cyst on back           Social History            Social History     Marital status:        Spouse name: N/A     Number of children: 0     Years of education: N/A            Occupational History       [employer]       nurse assistant       Retired              Social History Main Topics      Smoking status: Never Smoker      Smokeless tobacco: Never Used      Alcohol use 0.0 - 0.6 oz/week       0 - 1 Standard drinks or equivalent per week         Comment: rare      Drug use: No      Sexual activity: No            Other Topics Concern     Parent/Sibling W/ Cabg, Mi Or Angioplasty Before 65f 55m? No      Social History Narrative         family history includes Arthritis in her father, mother, and sister; Blood Disease in her paternal grandfather; Breast Cancer in her mother; CEREBROVASCULAR DISEASE in her mother; DIABETES in her father; Eye Disorder in her mother; HEART DISEASE in her mother. There is no history of Family History Negative.         IMAGING per my own measurement and interpretation:  xrays long cassette/MRI lumbar     Pelvic incidence of 65 , lumbar lordosis of 29 , grade 1 L3-4 spondylolisthesis, prior laminectomy/pedicle screws at L4-5 with halo formation, right L2-3 disc herniation with right lateral recess stenosis and central stenosis  Standing with pelvic retroversion and knee/hip flexion to compensate for flatback  deformity and achieve upward stance  Pelvic tilt 39 degrees    Postop xrays 05/16/19            Mr Lumbar Spine W/o & W Contrast     Result Date: 6/8/2017  MR LUMBAR SPINE WITHOUT AND WITH CONTRAST  6/8/2017 11:13 AM HISTORY: Dorsalgia, unspecified. Low back pain and left leg pain. Surgery in October 2015. COMPARISON: July 2015. TECHNIQUE:  Sagittal and axial T1 with and without contrast. 5 mL Gadavist.  Sagittal T2 and axial T2. Sagittal STIR. FINDINGS:  Five lumbar vertebrae are assumed. Again there is a transitional vertebral body which will be treated as S1 to facilitate comparison. Rudimentary disc at S1-S2. There are postsurgical changes at L4-L5, including laminectomy and posterior robert and screw fixation with metallic artifact. There is underlying grade 2 spondylolisthesis at L4-L5. There is grade 1 spondylolisthesis at L3-L4. Slight scoliosis. Small atypical hemangioma within L2 again seen. Findings by specific level: There is facet degenerative change as follows: Mild bilateral L1-L2 and L2-L3, mild/moderate bilateral L3-L4, mild left L5-S1, right L5-S1 with ankylosis. L1-L2: Mild ligamentum flavum thickening. Slight disc bulging. Borderline central stenosis and no significant foraminal stenosis. L2-L3: Mild ligamentum flavum thickening. There is a mild broad-based disc protrusion extending from right parasagittal to right foraminal. There is some mass effect on the right L3 nerve root. There is disc bulging elsewhere and mild-moderate central stenosis. Mild bilateral foraminal stenosis. L3-L4: Disc bulging without significant overall central stenosis. The left neural foramen appears adequate. There is mild-moderate right foraminal stenosis. L4-L5: Postsurgical changes as above. No significant central stenosis. The neuroforamina appear adequate. L5-S1: Mild disc bulging. Partial anterior fusion. No stenosis. Compared with the previous exam, the L2-L3 disc protrusion and central stenosis are increased, the  L3-L4 central stenosis is decreased, the L4-L5 central stenosis is decreased.      IMPRESSION: 1. Multilevel degenerative disc and facet disease. 2. Interval surgery at L4-L5. 3. L1-L2: Borderline central stenosis. 4. L2-L3: Mild right asymmetric disc protrusion with mass effect on the L3 nerve root. Mild/moderate central stenosis. JOSE CRUZ SILVA MD     Xr Spine Complete 2 Views     Result Date: 12/21/2017  Exam: Full body radiographs using EOS History: Flat back syndrome, acquired Techniques: AP and lateral images of full body and secondary images of AP and lateral views of spine were submitted for interpretation. Comparison: 7/12/2017 radiographs; 6/8/2017 lumbar spine MRI. Findings: 12 rib bearing vertebral bodies and 5 lumbar type vertebral bodies are identified. Postsurgical changes of spinal fusion instrumentation at L4-5 with grade 1 anterolisthesis, similar to prior. There is moderate to severe adjacent level degenerative disc change at the L3-L4 with associated anterolisthesis, also similar to prior. Mild reversal of the normal cervical lordosis with trace anterolisthesis of C3 on C4. Degenerative changes of cervical spine.. Coronal Deformity: There is a no substantial  coronal plane curvature of the spine. No substantial global coronal imbalance. Sagittal Vertical Axis (A vertical line drawn from the center of C7 (farheen line) to the posterosuperior aspect of the S1 on sagittal plane):  positive Weight bearing axis: (Defined as a line drawn from the center of the femoral head to the mid aspect of the tibial plafond).     Right: Weight bearing axis crosses central 1/3 of lateral tibial plateau.      Left: Weight bearing axis crosses between tibial spines. Leg length:  (Measured from the top of the femoral head to the center of tibial plafond.  It is assumed joints are in similar degrees of extension bilaterally.  Significant difference is defined when discrepancy is greater than 1.5 cm).       No significant   leg length discrepancy. Additional Findings: Mild to moderate lateral compartment joint space narrowing in the right knee joint. Pelvic enthesopathic change. No acute osseous abnormality.  There is a nonobstructive bowel gas pattern.      Impression: 1. Stable postsurgical changes of spinal fusion at L4-5 with associated grade 1 anterolisthesis with moderate to severe adjacent level degenerative disc change and associated grade 1 anterolisthesis at L3-4. 2. Positive global sagittal imbalance. 4. Weight bearing axis as detailed above. I have personally reviewed the examination and initial interpretation and I agree with the findings. RAMONE OH        Vitamin D:  25 OH Vit D2   Date Value Ref Range Status   11/15/2018 <5 ug/L Final   08/16/2018 <5 ug/L Final   06/20/2018 <5 ug/L Final     25 OH Vit D3   Date Value Ref Range Status   11/15/2018 55 ug/L Final   08/16/2018 52 ug/L Final   06/20/2018 57 ug/L Final     25 OH Vit D total   Date Value Ref Range Status   11/15/2018 <60 20 - 75 ug/L Final     Comment:     Season, race, dietary intake, and treatment affect the concentration of   25-hydroxy-Vitamin D. Values may decrease during winter months and increase   during summer months. Values 20-29 ug/L may indicate Vitamin D insufficiency   and values <20 ug/L may indicate Vitamin D deficiency.  This test was developed and its performance characteristics determined by the   [facility],  Special Chemistry Laboratory. It has   not been cleared or approved by the FDA. The laboratory is regulated under   CLIA as qualified to perform high-complexity testing. This test is used for   clinical purposes. It should not be regarded as investigational or for   research.     08/16/2018 <57 20 - 75 ug/L Final     Comment:     Season, race, dietary intake, and treatment affect the concentration of   25-hydroxy-Vitamin D. Values may decrease during winter months and increase   during summer months.  "Values 20-29 ug/L may indicate Vitamin D insufficiency   and values <20 ug/L may indicate Vitamin D deficiency.  This test was developed and its performance characteristics determined by the   [facility],  Special Chemistry Laboratory. It has   not been cleared or approved by the FDA. The laboratory is regulated under   CLIA as qualified to perform high-complexity testing. This test is used for   clinical purposes. It should not be regarded as investigational or for   research.     06/20/2018 <62 20 - 75 ug/L Final     Comment:     Season, race, dietary intake, and treatment affect the concentration of   25-hydroxy-Vitamin D. Values may decrease during winter months and increase   during summer months. Values 20-29 ug/L may indicate Vitamin D insufficiency   and values <20 ug/L may indicate Vitamin D deficiency.  This test was developed and its performance characteristics determined by the   [facility],  Special Chemistry Laboratory. It has   not been cleared or approved by the FDA. The laboratory is regulated under   CLIA as qualified to perform high-complexity testing. This test is used for   clinical purposes. It should not be regarded as investigational or for   research.                  Nutritional Status:  Body mass index is 21.26 kg/m .           Lab Results   Component Value Date     ALBUMIN 3.7 10/12/2015         Diabetes Screening:        Lab Results   Component Value Date     A1C 5.5 01/11/2013         Nicotine Usage:     No                     Physical Exam   Body mass index is 21.26 kg/m .  /41 (BP Location: Left arm, Patient Position: Sitting, Cuff Size: Adult Regular)   Pulse 64   Temp 98  F (36.7  C) (Oral)   Resp 16   Ht 1.664 m (5' 5.51\")   Wt 58.9 kg (129 lb 12.8 oz)   SpO2 98%   BMI 21.26 kg/m      Constitutional: Oriented to person, place, and time. Appears well-developed and well-nourished. Cooperative. No distress.   HENT: "   Head: Normocephalic and atraumatic.   Eyes: Conjunctivae are normal.  Neck: Normal range of motion. Neck supple. No spinous process tenderness and no muscular tenderness present. No tracheal deviation present.  Cardiovascular: Normal rate and regular rhythm.    Pulmonary/Chest: Effort normal and breath sounds normal.  Abdominal: Soft. Bowel sounds are normal. Exhibits no distension. There is no tenderness.   Musculoskeletal:   Cervical flexion-extension range of motion: pain with extension greater than 15 degrees  Lumbar flexion/extension range of motion: limited due to pain    Neurological: alert and oriented to person, place, and time. No cranial nerve deficit   Decreased sensation webbing right great toe     Reflex Scores:           Bicep reflexes are 3+ on the right side and 3+ on the left side.       Brachioradialis reflexes are 3+ on the right side and 3+ on the left side.       Patellar reflexes are 1+ on the right side and 1+ on the left side.       Achilles reflexes are 1+ on the right side and 1+ on the left side.     STRENGTH LEFT RIGHT   Deltoid 5 5   Bicep 5 5   Wrist Extensor 5 5   Tricep 5 5   Finger flexion 5 5   Finger abduction 5 5    5 5         Hip Flexion       5       5   Knee Extension 5 5   Ankle Dorsiflexion 5 4   Extensor Hallucis Longus 5 3   Plantar Flexion 5 5   Foot eversion 5 5   Foot inversion 5 5     No Lhermitte's, No Spurling's  No Radha's   No ankle clonus  Able to tandem walk     Skin: Skin is warm, dry and intact.   Psychiatric: Normal mood and affect. Speech is normal and behavior is normal.           ASSESSMENT:  Marcia Kauffman is a 67 year old female one year s/p L2-pelvis posterior fusion for flatback deformity        PLAN:    Doing well at one year postop, some adjacent segment disc degeneration at L1-2 not symptomatic  Has left hip pain in lateral thigh, radiating to groin, occasionally to anterior thigh- had previous diagnosis of meralgia paresthetica but now  has some reproduction of pain with external femoral rotation and is interested in another opinion on hip management, we recommended Dr. Claudy Nixon and placed a referral to him for left hip pain. I explained to Marcia that Dr. Nixon is very thoughtful about hip pain and whatever he recommends, we will do.    followup for spine in one year with EOS xrays  Complete 24 months of Forteo therapy and followup with Dr. Rogers as scheduled previously          Nanette Beckford MD    [facility] Department of Neurosurgery  Complex Spinal Deformity, Scoliosis, and Minimally Invasive Spine Surgery Specialist  Office: [phone]

## 2019-06-18 NOTE — TELEPHONE ENCOUNTER
RECORDS RECEIVED FROM: Lt hip OA, appt per Clinic. Records and referral in Epic. No previous surgery. Referred by Nanette Beckford MD   DATE RECEIVED: Jun 27, 2019    NOTES STATUS DETAILS   OFFICE NOTE from referring provider Internal Dr. Beckford 5/16/19   OFFICE NOTE from other specialist Internal EMG 9/27/16   DISCHARGE SUMMARY from hospital N/A    DISCHARGE REPORT from the ER N/A    OPERATIVE REPORT N/A    MEDICATION LIST Internal    IMPLANT RECORD/STICKER N/A    LABS     CBC/DIFF N/A    CULTURES N/A    INJECTIONS DONE IN RADIOLOGY N/A    MRI N/A    CT SCAN N/A    XRAYS (IMAGES & REPORTS) Internal 5/16/19...   TUMOR     PATHOLOGY  Slides & report N/A

## 2019-06-24 ASSESSMENT — ENCOUNTER SYMPTOMS
SLEEP DISTURBANCES DUE TO BREATHING: 0
MEMORY LOSS: 0
PALPITATIONS: 1
LOSS OF CONSCIOUSNESS: 0
ARTHRALGIAS: 1
EXERCISE INTOLERANCE: 0
LEG PAIN: 1
HEADACHES: 0
JOINT SWELLING: 1
NUMBNESS: 1
STIFFNESS: 1
HYPOTENSION: 0
HYPERTENSION: 0
TINGLING: 0
NECK PAIN: 1
SPEECH CHANGE: 0
TREMORS: 0
LIGHT-HEADEDNESS: 0
ORTHOPNEA: 0
DIZZINESS: 0
SEIZURES: 0
MUSCLE CRAMPS: 1
WEAKNESS: 1
DISTURBANCES IN COORDINATION: 0
BACK PAIN: 1
PARALYSIS: 0
MYALGIAS: 1
MUSCLE WEAKNESS: 1
SYNCOPE: 0

## 2019-06-24 ASSESSMENT — ACTIVITIES OF DAILY LIVING (ADL)
ADL_SUBSCALE_SCORE: 51.47
ADL_MEAN: 1.94
ADL_SUM: 33

## 2019-06-24 ASSESSMENT — HOOS S4: HOW SEVERE IS YOUR HIP JOINT STIFFNESS AFTER FIRST WAKENING IN THE MORNING?: MODERATE

## 2019-06-25 ENCOUNTER — TELEPHONE (OUTPATIENT)
Dept: OBGYN | Facility: CLINIC | Age: 69
End: 2019-06-25

## 2019-06-25 DIAGNOSIS — M81.0 SENILE OSTEOPOROSIS: Primary | ICD-10-CM

## 2019-06-25 NOTE — TELEPHONE ENCOUNTER
Pt called stating she needs order for DEXA.  Review of Epic shows Dr. Rogers wants her to have this in September in [place]. Order entered.

## 2019-06-26 DIAGNOSIS — M25.552 LEFT HIP PAIN: Primary | ICD-10-CM

## 2019-06-27 ENCOUNTER — PRE VISIT (OUTPATIENT)
Dept: ORTHOPEDICS | Facility: CLINIC | Age: 69
End: 2019-06-27

## 2019-06-27 ENCOUNTER — OFFICE VISIT (OUTPATIENT)
Dept: ORTHOPEDICS | Facility: CLINIC | Age: 69
End: 2019-06-27
Attending: NEUROLOGICAL SURGERY
Payer: COMMERCIAL

## 2019-06-27 ENCOUNTER — ANCILLARY PROCEDURE (OUTPATIENT)
Dept: GENERAL RADIOLOGY | Facility: CLINIC | Age: 69
End: 2019-06-27
Attending: ORTHOPAEDIC SURGERY
Payer: COMMERCIAL

## 2019-06-27 VITALS — BODY MASS INDEX: 20.73 KG/M2 | WEIGHT: 129 LBS | HEIGHT: 66 IN

## 2019-06-27 DIAGNOSIS — M25.552 LEFT HIP PAIN: Primary | ICD-10-CM

## 2019-06-27 ASSESSMENT — MIFFLIN-ST. JEOR: SCORE: 1114.11

## 2019-06-27 ASSESSMENT — PATIENT HEALTH QUESTIONNAIRE - PHQ9
SUM OF ALL RESPONSES TO PHQ QUESTIONS 1-9: 0
SUM OF ALL RESPONSES TO PHQ QUESTIONS 1-9: 0

## 2019-06-27 NOTE — NURSING NOTE
"Reason For Visit:   Chief Complaint   Patient presents with     Consult     Left Hip OA       Ht 1.664 m (5' 5.51\")   Wt 58.5 kg (129 lb)   BMI 21.13 kg/m      Pain Assessment  Patient Currently in Pain: Yes  0-10 Pain Scale: 5(groin and lateral side with radiating down th lateral side of the thigh )    Andry Vivas, [id number]  "

## 2019-06-27 NOTE — PROGRESS NOTES
"Chief Complaint: Consult (Left Hip OA)      Physician:  Nanette Beckford    HPI: Marcia Kauffman is a 68 year old female who presents today for evaluation of    Symptom Profile  Location of symptoms:  Left flank, left buttock, lateral hip, groin (20%), lateral thigh, calf and ankle  Onset: acute on chronic and associates with her spine  Duration of symptoms:> a year   Quality of symptoms: aching, \"tight\"  Alleviate: activity modificaction, heat  Exacerbating: stairs, rotation  Previous Treatments: Previous treatments include activity modification, oral pain medication,     Intra-articular injection helped the groin pain (6 months ago0  Physical therapy    Current Status:  Results of the patient s Hip Disability and Osteoarthritis Outcome Score (HOOS)  are as follows (0-100 scales with 100 being the theoretical best):  Pain:  50  Symptoms: 75   ADLs:Sports/Recreation:51.47  Quality of Life:50  (http://koos.nu/)  [facility] Activity Score: 6    MEDICAL HISTORY:   Past Medical History:   Diagnosis Date     Endometriosis, site unspecified      Herpes simplex without mention of complication     HSV 2     MEDICAL HISTORY OF -     L4/5 spondololisthesis     PONV (postoperative nausea and vomiting)      Postmenopausal atrophic vaginitis 2004    Estring vag ring-stopped     Premature ventricular contractions 2007     Radial styloid tenosynovitis     arthritis in back and wrists     Spinal stenosis, lumbar region, without neurogenic claudication      Symptomatic menopausal or female climacteric states     prev on HRT-orthoprefest, effexor, paxil       Medications:     Current Outpatient Medications:      Acetaminophen (TYLENOL PO), , Disp: , Rfl:      apixaban ANTICOAGULANT (ELIQUIS) 5 MG tablet, Take 5 mg by mouth, Disp: , Rfl:      CALCIUM-VITAMIN D PO, , Disp: , Rfl:      Coenzyme Q10 (CO Q 10 PO), Take 1 chew tab by mouth every morning , Disp: , Rfl:      DAILY MULTI VITAMIN/MINERALS OR, 1 TABLET DAILY AT BREAKFAST " ON HOLD FOR SURGERY SINCE 05/01/2018, Disp: , Rfl:      FLAX SEED OIL OR, takes in her cereal. STIOPPED 05/01/2018, Disp: , Rfl:      GLUCOSAMINE CHONDR 500 COMPLEX OR CAPS, 1 capsule daily at noon, Disp: , Rfl:      insulin pen needle (B-D U/F) 31G X 5 MM, Use once daily as directed with Forteo, Disp: 100 each, Rfl: 3     magnesium citrate solution, Take 296 mLs by mouth At Bedtime, Disp: , Rfl:      metoprolol succinate (TOPROL-XL) 25 MG 24 hr tablet, Take 25 mg by mouth as needed, Disp: , Rfl:      montelukast (SINGULAIR) 10 MG tablet, Take 1 tablet (10 mg) by mouth daily (Patient taking differently: Take 10 mg by mouth daily as needed ), Disp: 30 tablet, Rfl: 11     Omega-3 Fatty Acids (OMEGA-3 FISH OIL) 1200 MG CAPS, Take 1,200 mg by mouth 2 times daily (with meals) ON HOLD FOR SURGERY SINCE 05/01/2018, Disp: , Rfl:      Probiotic Product (PROBIOTIC PO), Take by mouth every other day, Disp: , Rfl:      teriparatide, recombinant, (FORTEO) 600 MCG/2.4ML SOLN injection, Inject 0.08 mLs (20 mcg) Subcutaneous daily, Disp: 2.4 mL, Rfl: 11     UNABLE TO FIND, Apply 0.25 tsp. topically 2 times daily MEDICATION NAME: Bio- estrol, plant based hormone replacement therapy , Disp: , Rfl:      UNCLASSIFIED OTC PRODUCT MISC, Calcium AEP 1-2 capsules by mouth daily  Supports cell membranes* TID, Disp: , Rfl:      UNCLASSIFIED OTC PRODUCT MISC, RelaxaMag  1 capsule at bedtime as needed -  a unique magnesium supplement that helps improve sleep and stress tolerance in the evening, Disp: , Rfl:      valACYclovir (VALTREX) 500 MG tablet, Take 1 tablet (500 mg) by mouth 2 times daily For 3 days when flares occur (Patient taking differently: Take 500 mg by mouth every morning For 3 days when flares occur), Disp: 30 tablet, Rfl: 3     VITAMIN C 500 MG OR TABS, 2 tab daily AT BREAKFAST, Disp: , Rfl:     Allergies: Dust mite extract; Escitalopram; Hydrocodone; Meperidine; Mold; and Trees    SURGICAL HISTORY:   Past Surgical History:    Procedure Laterality Date     ARTHROSCOPY KNEE RT/LT  2010    Right knee, torn meniscus     BREAST LUMPECTOMY, RT/LT  3/1991    Right benign lump     CHOLECYSTECTOMY, OPEN  1981     COLONOSCOPY  2016     FUSION SPINE POSTERIOR TWO LEVELS Left 10/20/2015    Procedure: FUSION SPINE POSTERIOR TWO LEVELS;  Surgeon: Leo Dixon MD;  Location: [facility]     H ABLATION PVC       HC EXCIS TENDON SHEATH LESION FOREARM/WRIST      treatment for frozen thumb     HYSTEROSCOPY       LAPAROSCOPY DIAGNOSTIC (GYN)  1984    3 total scopes for fertility     OPTICAL TRACKING SYSTEM FUSION SPINE POSTERIOR LUMBAR THREE+ LEVELS N/A 2018    Procedure: OPTICAL TRACKING SYSTEM FUSION SPINE POSTERIOR LUMBAR THREE+ LEVELS;  O-Arm/Stealth Assisted Lumbar 2 To Sacral 1 Posterior Instrumented Fusion, Pelvic Fixation, Revision Of Lumbar 4-5 Instrumentation, Transforaminal Interbody Fusion with bone morphogenic protein, Oshea-Limon Osteotomies,  Lumbar 2-3, 3-4, 4-5;  Surgeon: Nanette Beckford MD;  Location: [facility] OR     SURGICAL HISTORY OF -   95    Excision of cyst on back       FAMILY HISTORY:   Family History   Problem Relation Age of Onset     Breast Cancer Mother         cancer -free for 17 years, XRT     Arthritis Mother      Eye Disorder Mother         macular degeneration     Heart Disease Mother         murmur     Cerebrovascular Disease Mother      Diabetes Father              Arthritis Father      Arthritis Sister      Blood Disease Paternal Grandfather      Family History Negative No family hx of        SOCIAL HISTORY:   Social History     Tobacco Use     Smoking status: Never Smoker     Smokeless tobacco: Never Used   Substance Use Topics     Alcohol use: Yes     Alcohol/week: 0.0 - 0.6 oz     Comment: very little       REVIEW OF SYSTEMS:  The comprehensive review of systems from the intake form was reviewed with the patient.  No fever, weight change or fatigue. No dry eyes. No oral  "ulcers, sore throat or voice change. No palpitations, syncope, angina or edema.  No chest pain, excessive sleepiness, shortness of breath or hemoptysis.   No abdominal pain, nausea, vomiting, diarrhea or heartburn.  No skin rash. No focal weakness or numbness. No bleeding or lymphadenopathy. No rhinitis or hives.     Exam:  On physical examination the patient appears the stated age, is in no acute distress, affectThe is appropriate, and breathing is non-labored.  Vitals are documented in the EMR and have been reviewed:    Ht 1.664 m (5' 5.51\")   Wt 58.5 kg (129 lb)   BMI 21.13 kg/m    5' 5.51\"  Body mass index is 21.13 kg/m .  RIGHT hip subjective: not irriated   Abd: 30  Add:  PFC:  Flexion: 100  IRF:20  ERF:30  Impingement test: 0  Symmetric ROM   LEFT hip subjective:  Abd:  Add:  PFC:  Flexion:  IRF:  ERF:  Impingement test: mild + groin pain    Distally, the circulatory, motor, and sensation exam is intact with 5/5 EHL, gastroc-soleus, and tibialis anterior.  Sensation to light touch is intact.  Dorsalis pedis and posterior tibialis pulses are palpable.  There are no sores on the feet, no bruising, and no lymphedema.    X-rays:   bilatearl Tonnis grade 1 joitn space without joint space narrowing    Assessment: This is a 68 year old with a relatively small percentage of her symptoms in the groin. She had some relief of the groin pain only with intra-articular injection. Given her history of oral steroid use we discussed the differential and that this would include avascular necrosis.   Plan:  MR left hip r/o avascular necrosis. Discussed that at 68 I would anticipate some degenerative changes in the hip including the labrum, that this may be read as a labral tear, and we discussed that this is a very common finding in the asymptomatic population. The MR is to make sure we are not missing AVN. Willl follow-up with a clinic time phone appointment to review the results.     Answers for HPI/ROS submitted by the " patient on 6/24/2019   General Symptoms: No  Skin Symptoms: No  HENT Symptoms: No  EYE SYMPTOMS: No  HEART SYMPTOMS: Yes  LUNG SYMPTOMS: No  INTESTINAL SYMPTOMS: No  URINARY SYMPTOMS: No  GYNECOLOGIC SYMPTOMS: No  BREAST SYMPTOMS: No  SKELETAL SYMPTOMS: Yes  BLOOD SYMPTOMS: No  NERVOUS SYSTEM SYMPTOMS: Yes  MENTAL HEALTH SYMPTOMS: No  Chest pain or pressure: No  Fast or irregular heartbeat: Yes  Pain in legs with walking: Yes  Trouble breathing while lying down: No  Fingers or toes appear blue: No  High blood pressure: No  Low blood pressure: No  Fainting: No  Murmurs: No  Pacemaker: No  Varicose veins: No  Edema or swelling: Yes  Wake up at night with shortness of breath: No  Light-headedness: No  Exercise intolerance: No  Back pain: Yes  Muscle aches: Yes  Neck pain: Yes  Swollen joints: Yes  Joint pain: Yes  Bone pain: Yes  Muscle cramps: Yes  Muscle weakness: Yes  Joint stiffness: Yes  Bone fracture: No  Trouble with coordination: No  Dizziness or trouble with balance: No  Fainting or black-out spells: No  Memory loss: No  Headache: No  Seizures: No  Speech problems: No  Tingling: No  Tremor: No  Weakness: Yes  Difficulty walking: Yes  Paralysis: No  Numbness: Yes  PHQ9 TOTAL SCORE: 0

## 2019-06-27 NOTE — LETTER
"6/27/2019       RE: Marcia Kauffman  [id number]  [facility] [id number]     Dear Colleague,    Thank you for referring your patient, Marcia Kauffman, [facility] Please see a copy of my visit note below.    Chief Complaint: Consult (Left Hip OA)      Physician:  Nanette Beckford    HPI: Marcia Kauffman is a 68 year old female who presents today for evaluation of    Symptom Profile  Location of symptoms:  Left flank, left buttock, lateral hip, groin (20%), lateral thigh, calf and ankle  Onset: acute on chronic and associates with her spine  Duration of symptoms:> a year   Quality of symptoms: aching, \"tight\"  Alleviate: activity modificaction, heat  Exacerbating: stairs, rotation  Previous Treatments: Previous treatments include activity modification, oral pain medication,     Intra-articular injection helped the groin pain (6 months ago0  Physical therapy    Current Status:  Results of the patient s Hip Disability and Osteoarthritis Outcome Score (HOOS)  are as follows (0-100 scales with 100 being the theoretical best):  Pain:  50  Symptoms: 75   ADLs:Sports/Recreation:51.47  Quality of Life:50  (http://koos.nu/)  [facility] Activity Score: 6    MEDICAL HISTORY:   Past Medical History:   Diagnosis Date     Endometriosis, site unspecified      Herpes simplex without mention of complication     HSV 2     MEDICAL HISTORY OF -     L4/5 spondololisthesis     PONV (postoperative nausea and vomiting)      Postmenopausal atrophic vaginitis 2004    Estring vag ring-stopped     Premature ventricular contractions 2007     Radial styloid tenosynovitis     arthritis in back and wrists     Spinal stenosis, lumbar region, without neurogenic claudication      Symptomatic menopausal or female climacteric states     prev on HRT-orthoprefest, effexor, paxil       Medications:     Current Outpatient Medications:      Acetaminophen (TYLENOL PO), , " Disp: , Rfl:      apixaban ANTICOAGULANT (ELIQUIS) 5 MG tablet, Take 5 mg by mouth, Disp: , Rfl:      CALCIUM-VITAMIN D PO, , Disp: , Rfl:      Coenzyme Q10 (CO Q 10 PO), Take 1 chew tab by mouth every morning , Disp: , Rfl:      DAILY MULTI VITAMIN/MINERALS OR, 1 TABLET DAILY AT BREAKFAST ON HOLD FOR SURGERY SINCE 05/01/2018, Disp: , Rfl:      FLAX SEED OIL OR, takes in her cereal. STIOPPED 05/01/2018, Disp: , Rfl:      GLUCOSAMINE CHONDR 500 COMPLEX OR CAPS, 1 capsule daily at noon, Disp: , Rfl:      insulin pen needle (B-D U/F) 31G X 5 MM, Use once daily as directed with Forteo, Disp: 100 each, Rfl: 3     magnesium citrate solution, Take 296 mLs by mouth At Bedtime, Disp: , Rfl:      metoprolol succinate (TOPROL-XL) 25 MG 24 hr tablet, Take 25 mg by mouth as needed, Disp: , Rfl:      montelukast (SINGULAIR) 10 MG tablet, Take 1 tablet (10 mg) by mouth daily (Patient taking differently: Take 10 mg by mouth daily as needed ), Disp: 30 tablet, Rfl: 11     Omega-3 Fatty Acids (OMEGA-3 FISH OIL) 1200 MG CAPS, Take 1,200 mg by mouth 2 times daily (with meals) ON HOLD FOR SURGERY SINCE 05/01/2018, Disp: , Rfl:      Probiotic Product (PROBIOTIC PO), Take by mouth every other day, Disp: , Rfl:      teriparatide, recombinant, (FORTEO) 600 MCG/2.4ML SOLN injection, Inject 0.08 mLs (20 mcg) Subcutaneous daily, Disp: 2.4 mL, Rfl: 11     UNABLE TO FIND, Apply 0.25 tsp. topically 2 times daily MEDICATION NAME: Bio- estrol, plant based hormone replacement therapy , Disp: , Rfl:      UNCLASSIFIED OTC PRODUCT MISC, Calcium AEP 1-2 capsules by mouth daily  Supports cell membranes* TID, Disp: , Rfl:      UNCLASSIFIED OTC PRODUCT MISC, RelaxaMag  1 capsule at bedtime as needed -  a unique magnesium supplement that helps improve sleep and stress tolerance in the evening, Disp: , Rfl:      valACYclovir (VALTREX) 500 MG tablet, Take 1 tablet (500 mg) by mouth 2 times daily For 3 days when flares occur (Patient taking differently: Take  500 mg by mouth every morning For 3 days when flares occur), Disp: 30 tablet, Rfl: 3     VITAMIN C 500 MG OR TABS, 2 tab daily AT BREAKFAST, Disp: , Rfl:     Allergies: Dust mite extract; Escitalopram; Hydrocodone; Meperidine; Mold; and Trees    SURGICAL HISTORY:   Past Surgical History:   Procedure Laterality Date     ARTHROSCOPY KNEE RT/LT  2010    Right knee, torn meniscus     BREAST LUMPECTOMY, RT/LT  3/1991    Right benign lump     CHOLECYSTECTOMY, OPEN       COLONOSCOPY  2016     FUSION SPINE POSTERIOR TWO LEVELS Left 10/20/2015    Procedure: FUSION SPINE POSTERIOR TWO LEVELS;  Surgeon: Leo Dixon MD;  Location: [facility]     H ABLATION PVC       HC EXCIS TENDON SHEATH LESION FOREARM/WRIST      treatment for frozen thumb     HYSTEROSCOPY       LAPAROSCOPY DIAGNOSTIC (GYN)      3 total scopes for fertility     OPTICAL TRACKING SYSTEM FUSION SPINE POSTERIOR LUMBAR THREE+ LEVELS N/A 2018    Procedure: OPTICAL TRACKING SYSTEM FUSION SPINE POSTERIOR LUMBAR THREE+ LEVELS;  O-Arm/Stealth Assisted Lumbar 2 To Sacral 1 Posterior Instrumented Fusion, Pelvic Fixation, Revision Of Lumbar 4-5 Instrumentation, Transforaminal Interbody Fusion with bone morphogenic protein, Oshea-Limon Osteotomies,  Lumbar 2-3, 3-4, 4-5;  Surgeon: Nanette Beckford MD;  Location: [facility]     SURGICAL HISTORY OF -   95    Excision of cyst on back       FAMILY HISTORY:   Family History   Problem Relation Age of Onset     Breast Cancer Mother         cancer -free for 17 years, XRT     Arthritis Mother      Eye Disorder Mother         macular degeneration     Heart Disease Mother         murmur     Cerebrovascular Disease Mother      Diabetes Father              Arthritis Father      Arthritis Sister      Blood Disease Paternal Grandfather      Family History Negative No family hx of        SOCIAL HISTORY:   Social History     Tobacco Use     Smoking status: Never Smoker     Smokeless  "tobacco: Never Used   Substance Use Topics     Alcohol use: Yes     Alcohol/week: 0.0 - 0.6 oz     Comment: very little       REVIEW OF SYSTEMS:  The comprehensive review of systems from the intake form was reviewed with the patient.  No fever, weight change or fatigue. No dry eyes. No oral ulcers, sore throat or voice change. No palpitations, syncope, angina or edema.  No chest pain, excessive sleepiness, shortness of breath or hemoptysis.   No abdominal pain, nausea, vomiting, diarrhea or heartburn.  No skin rash. No focal weakness or numbness. No bleeding or lymphadenopathy. No rhinitis or hives.     Exam:  On physical examination the patient appears the stated age, is in no acute distress, affectThe is appropriate, and breathing is non-labored.  Vitals are documented in the EMR and have been reviewed:    Ht 1.664 m (5' 5.51\")   Wt 58.5 kg (129 lb)   BMI 21.13 kg/m     5' 5.51\"  Body mass index is 21.13 kg/m .  RIGHT hip subjective: not irriated   Abd: 30  Add:  PFC:  Flexion: 100  IRF:20  ERF:30  Impingement test: 0  Symmetric ROM   LEFT hip subjective:  Abd:  Add:  PFC:  Flexion:  IRF:  ERF:  Impingement test: mild + groin pain    Distally, the circulatory, motor, and sensation exam is intact with 5/5 EHL, gastroc-soleus, and tibialis anterior.  Sensation to light touch is intact.  Dorsalis pedis and posterior tibialis pulses are palpable.  There are no sores on the feet, no bruising, and no lymphedema.    X-rays:   bilatearl Tonnis grade 1 joitn space without joint space narrowing    Assessment: This is a 68 year old with a relatively small percentage of her symptoms in the groin. She had some relief of the groin pain only with intra-articular injection. Given her history of oral steroid use we discussed the differential and that this would include avascular necrosis.   Plan:  MR left hip r/o avascular necrosis. Discussed that at 68 I would anticipate some degenerative changes in the hip including the " labrum, that this may be read as a labral tear, and we discussed that this is a very common finding in the asymptomatic population. The MR is to make sure we are not missing AVN. Willl follow-up with a clinic time phone appointment to review the results.     Again, thank you for allowing me to participate in the care of your patient.      Sincerely,    Claudy Nixon MD

## 2019-06-28 ASSESSMENT — PATIENT HEALTH QUESTIONNAIRE - PHQ9: SUM OF ALL RESPONSES TO PHQ QUESTIONS 1-9: 0

## 2019-07-10 ENCOUNTER — HOSPITAL ENCOUNTER (OUTPATIENT)
Dept: MRI IMAGING | Facility: CLINIC | Age: 69
Discharge: HOME | End: 2019-07-10
Attending: ORTHOPAEDIC SURGERY | Admitting: ORTHOPAEDIC SURGERY
Payer: COMMERCIAL

## 2019-07-10 DIAGNOSIS — M25.552 LEFT HIP PAIN: ICD-10-CM

## 2019-07-10 PROCEDURE — 73721 MRI JNT OF LWR EXTRE W/O DYE: CPT | Mod: LT

## 2019-07-29 ENCOUNTER — TELEPHONE (OUTPATIENT)
Dept: ORTHOPEDICS | Facility: CLINIC | Age: 69
End: 2019-07-29

## 2019-07-29 DIAGNOSIS — M25.552 LEFT HIP PAIN: Primary | ICD-10-CM

## 2019-07-29 NOTE — TELEPHONE ENCOUNTER
[facility]    Phone Message    May a detailed message be left on voicemail: yes    Reason for Call: Other: Patient is requesting a call back to discuss her plan of care as she received her results but hasnt gotten a follow up with how to proceed.      Action Taken: Message routed to:  [facility] Rheum

## 2019-08-13 ENCOUNTER — TELEPHONE (OUTPATIENT)
Dept: ORTHOPEDICS | Facility: CLINIC | Age: 69
End: 2019-08-13

## 2019-08-13 NOTE — TELEPHONE ENCOUNTER
[facility]    Phone Message    May a detailed message be left on voicemail: yes    Reason for Call: Other: Pt has not received a call back, it has been two weeks, please call her back asap to discuss next steps.     Action Taken: Message routed to:  [facility] ([facility] Ortho

## 2019-08-13 NOTE — TELEPHONE ENCOUNTER
Spoke with patient about the results of her MR. No evidence of AVN but there is degeneration of the abductor tendons which is consistent with her symptoms. We discussed a physical therapy program and the roll of injection. All the patient's questions were answered to the best of my ability.

## 2019-09-11 ENCOUNTER — HOSPITAL ENCOUNTER (OUTPATIENT)
Dept: PHYSICAL THERAPY | Facility: CLINIC | Age: 69
Setting detail: THERAPIES SERIES
End: 2019-09-11
Attending: ORTHOPAEDIC SURGERY
Payer: COMMERCIAL

## 2019-09-11 DIAGNOSIS — M25.552 LEFT HIP PAIN: ICD-10-CM

## 2019-09-11 PROCEDURE — 97110 THERAPEUTIC EXERCISES: CPT | Mod: GP | Performed by: PHYSICAL THERAPIST

## 2019-09-11 PROCEDURE — 97162 PT EVAL MOD COMPLEX 30 MIN: CPT | Mod: GP | Performed by: PHYSICAL THERAPIST

## 2019-09-11 NOTE — PROGRESS NOTES
09/11/19 1000   General Information   Type of Visit Initial OP Ortho PT Evaluation   Start of Care Date 09/11/19   Referring Physician Claudy Nixon MD   Patient/Family Goals Statement To be able to walk better and function better w/o being held back by pain   Date of Order 08/15/19   Certification Required? Yes   Medical Diagnosis L hip pain, abductor tendinopathy   Body Part(s)   Body Part(s) Hip   Presentation and Etiology   Pertinent history of current problem (include personal factors and/or comorbidities that impact the POC) Pt states she had T7 to S1 fusion in May of 2018.  Pt states L hip pain has become more prominant since the fusion.   L hip pain @ best 2/10,  @ worst 10/10.  Pt notes the L posterior hip pain will radiate up into the back, down lateral L LE into foot and also groin.  Pt notes permanent numbness in distal quad.  Pt notes weakness in L LE.   MRI in chart:  glut med tear,  tendinosis.  Meds:  tylenol.   PMHX: osteoporosis.  A--fib on blood thinner currently will have ablation in October.  R knee scope for meniscus.  Moderate: comorbidities   Impairments A. Pain;D. Decreased ROM;E. Decreased flexibility;F. Decreased strength and endurance;G. Impaired balance;H. Impaired gait   Onset date of current episode/exacerbation 08/15/19   Pain quality A. Sharp;B. Dull;C. Aching   Pain exacerbation comment going upstairs (notes multilevel home)--at times needs to do marked time especially by end of day.  Carrying laundry on stairs.  Difficulty sit <> stand especially in soft chair.  Sitting tolerance 1 hour ( back / hip limit).  Pt walks but notes it bothers.  Can't stand on L LE for dressing has to hold onto something.     Pain/symptoms eased by A. Sitting;B. Walking;C. Rest;E. Changing positions;G. Heat;H. Cold;I. OTC medication(s)   Progression of symptoms since onset: Worsened  (more pain , less strength)   Prior Level of Function   Functional Level Prior Comment shaheen berry supine,  HS  stretches, piriformis stretch, Standing hip abd / ext.   Stationary bike/ walks daily.   Current Level of Function   Patient role/employment history F. Retired   Fall Risk Screen   Fall screen completed by PT   Have you fallen 2 or more times in the past year? Yes   Have you fallen and had an injury in the past year? No   Timed Up and Go score (seconds) 11.3   Is patient a fall risk? No   Abuse Screen (yes response referral indicated)   Feels Unsafe at Home or Work/School no   Feels Threatened by Someone no   Does Anyone Try to Keep You From Having Contact with Others or Doing Things Outside Your Home? no   Hip Objective Findings   Side (if bilateral, select both right and left) Left   Observation Well healed midline incision   Posture R PSIS/ crest lower.  Decreased lordosis.     Gait/Locomotion hips shifted to L, slight limp w/o device.     Lumbar ROM LROM flex 80% ,  SB B 75%   Scour Test L w/ IR bothers hip   FELICIANO Test decreased mobility   FADIR Test + for groin pain   Palpation mod to severe tenderness L ITB, mod tenderness > trochanter.     Left Hip ER PROM R 30*, L 40*   Left Hip IR PROM R 40*, L 15*   Left Hip Flexion Strength R 4+/5, L 4-/5   Left Hip Abduction Strength R 4/5, L 3+/5   Left Hip Extension Strength not tested avoided prone position   Left Knee Flexion Strength R 5/5, L 4/5   Left Knee Extension Strength B 5/5   Fadi Flexibility Test very slight tightness   Left Hamstring Flexibility B WNL   Planned Therapy Interventions   Planned Therapy Interventions manual therapy;neuromuscular re-education;ROM;strengthening;stretching   Clinical Impression   Criteria for Skilled Therapeutic Interventions Met yes, treatment indicated   PT Diagnosis L hip pain / weakness   Influenced by the following impairments pain, stiffness, decreased strength, numbness   Functional limitations due to impairments stairs, sit <> stand, carrrying, dressing   Clinical Presentation Evolving/Changing   Clinical  Presentation Rationale hip symptoms gradually worsening since fusion   Clinical Decision Making (Complexity) Moderate complexity   Therapy Frequency 1 time/week   Predicted Duration of Therapy Intervention (days/wks) 2 weeks then 1 X / 2 weeks x 2 visits = 4 total   Risk & Benefits of therapy have been explained Yes   Patient, Family & other staff in agreement with plan of care Yes   Education Assessment   Barriers to Learning No barriers   Ortho Goal 1   Goal Description 1. Pt will be able to consistently go upstairs reciprocal w/ 1 rail.   Target Date 10/11/19   Ortho Goal 2   Goal Description 2.  Pt will have increased strength sit to stand    Target Date 10/31/19   Ortho Goal 3   Goal Description 3.  Pt will have increased ease carrying 10-15# for laundry/ groceries.     Target Date 10/31/19   Ortho Goal 4   Goal Description 4.  Pt will be independent and consistent w/HEP   Target Date 10/31/19   Total Evaluation Time   PT Eval, Moderate Complexity Minutes (71871) 25   Therapy Certification   Certification date from 09/11/19   Certification date to 10/31/19   Medical Diagnosis L hip pain, abductor tendinopathy     Thank you for this referral,    Casandra Lieberman, PT,  CEAS   #[id number]  [facility]  [phone]

## 2019-09-11 NOTE — PROGRESS NOTES
[facility]          OUTPATIENT PHYSICAL THERAPY ORTHOPEDIC EVALUATION  PLAN OF TREATMENT FOR OUTPATIENT REHABILITATION  (COMPLETE FOR INITIAL CLAIMS ONLY)  Patient's Last Name, First Name, M.I.  YOB: 1950  Marcia Kauffman    Provider s Name:  [facility]   Medical Record No.  [id number]   Start of Care Date:  09/11/19   Onset Date:  08/15/19   Type:     _X__PT   ___OT   ___SLP Medical Diagnosis:  L hip pain, abductor tendinopathy     PT Diagnosis:  L hip pain / weakness   Visits from SOC:  1      _________________________________________________________________________________  Plan of Treatment/Functional Goals:  manual therapy, neuromuscular re-education, ROM, strengthening, stretching     Goals  Goal Description: 1. Pt will be able to consistently go upstairs reciprocal w/ 1 rail.  Target Date: 10/11/19    Goal Description: 2.  Pt will have increased strength sit to stand   Target Date: 10/31/19    Goal Description: 3.  Pt will have increased ease carrying 10-15# for laundry/ groceries.    Target Date: 10/31/19    Goal Description: 4.  Pt will be independent and consistent w/HEP  Target Date: 10/31/19          Therapy Frequency:  1 time/week  Predicted Duration of Therapy Intervention:  2 weeks then 1 X / 2 weeks x 2 visits = 4 total    Casandra Lieberman, PT                 I CERTIFY THE NEED FOR THESE SERVICES FURNISHED UNDER        THIS PLAN OF TREATMENT AND WHILE UNDER MY CARE     (Physician co-signature of this document indicates review and certification of the therapy plan).                       Certification Date From:  09/11/19   Certification Date To:  10/31/19    Referring Provider:  Claudy Nixon MD    Initial Assessment        See Epic Evaluation Start of Care Date: 09/11/19

## 2019-09-18 ENCOUNTER — HOSPITAL ENCOUNTER (OUTPATIENT)
Dept: PHYSICAL THERAPY | Facility: CLINIC | Age: 69
Setting detail: THERAPIES SERIES
End: 2019-09-18
Attending: ORTHOPAEDIC SURGERY
Payer: COMMERCIAL

## 2019-09-18 PROCEDURE — 97110 THERAPEUTIC EXERCISES: CPT | Mod: GP | Performed by: PHYSICAL THERAPIST

## 2019-09-19 ENCOUNTER — HOSPITAL ENCOUNTER (OUTPATIENT)
Dept: BONE DENSITY | Facility: CLINIC | Age: 69
Discharge: HOME | End: 2019-09-19
Attending: FAMILY MEDICINE | Admitting: FAMILY MEDICINE
Payer: COMMERCIAL

## 2019-09-19 DIAGNOSIS — M81.0 SENILE OSTEOPOROSIS: ICD-10-CM

## 2019-09-19 PROCEDURE — 77080 DXA BONE DENSITY AXIAL: CPT

## 2019-10-02 ENCOUNTER — HEALTH MAINTENANCE LETTER (OUTPATIENT)
Age: 69
End: 2019-10-02

## 2019-10-14 ENCOUNTER — OFFICE VISIT (OUTPATIENT)
Dept: FAMILY MEDICINE | Facility: CLINIC | Age: 69
End: 2019-10-14
Attending: FAMILY MEDICINE
Payer: COMMERCIAL

## 2019-10-14 VITALS
SYSTOLIC BLOOD PRESSURE: 128 MMHG | DIASTOLIC BLOOD PRESSURE: 76 MMHG | HEART RATE: 94 BPM | HEIGHT: 65 IN | WEIGHT: 129.1 LBS | BODY MASS INDEX: 21.51 KG/M2

## 2019-10-14 DIAGNOSIS — M81.0 SENILE OSTEOPOROSIS: Primary | ICD-10-CM

## 2019-10-14 DIAGNOSIS — E61.9 DEFICIENCY OF NUTRIENT ELEMENT, UNSPECIFIED: ICD-10-CM

## 2019-10-14 DIAGNOSIS — M81.0 SENILE OSTEOPOROSIS: ICD-10-CM

## 2019-10-14 LAB
ANION GAP SERPL CALCULATED.3IONS-SCNC: 4 MMOL/L (ref 3–14)
BUN SERPL-MCNC: 23 MG/DL (ref 7–30)
CALCIUM SERPL-MCNC: 9 MG/DL (ref 8.5–10.1)
CHLORIDE SERPL-SCNC: 102 MMOL/L (ref 94–109)
CO2 SERPL-SCNC: 33 MMOL/L (ref 20–32)
CREAT SERPL-MCNC: 0.77 MG/DL (ref 0.52–1.04)
GFR SERPL CREATININE-BSD FRML MDRD: 77 ML/MIN/{1.73_M2}
GLUCOSE SERPL-MCNC: 101 MG/DL (ref 70–99)
HGB BLD-MCNC: 13.1 G/DL (ref 11.7–15.7)
MAGNESIUM SERPL-MCNC: 2.1 MG/DL (ref 1.6–2.3)
PHOSPHATE SERPL-MCNC: 3.5 MG/DL (ref 2.5–4.5)
POTASSIUM SERPL-SCNC: 4.1 MMOL/L (ref 3.4–5.3)
PTH-INTACT SERPL-MCNC: 51 PG/ML (ref 18–80)
SODIUM SERPL-SCNC: 139 MMOL/L (ref 133–144)

## 2019-10-14 PROCEDURE — 84165 PROTEIN E-PHORESIS SERUM: CPT | Performed by: FAMILY MEDICINE

## 2019-10-14 PROCEDURE — 84080 ASSAY ALKALINE PHOSPHATASES: CPT | Performed by: FAMILY MEDICINE

## 2019-10-14 PROCEDURE — 85018 HEMOGLOBIN: CPT | Performed by: FAMILY MEDICINE

## 2019-10-14 PROCEDURE — 36415 COLL VENOUS BLD VENIPUNCTURE: CPT | Performed by: FAMILY MEDICINE

## 2019-10-14 PROCEDURE — 83735 ASSAY OF MAGNESIUM: CPT | Performed by: FAMILY MEDICINE

## 2019-10-14 PROCEDURE — 00000402 ZZHCL STATISTIC TOTAL PROTEIN: Performed by: FAMILY MEDICINE

## 2019-10-14 PROCEDURE — 83970 ASSAY OF PARATHORMONE: CPT | Performed by: FAMILY MEDICINE

## 2019-10-14 PROCEDURE — G0463 HOSPITAL OUTPT CLINIC VISIT: HCPCS | Mod: ZF

## 2019-10-14 PROCEDURE — 82306 VITAMIN D 25 HYDROXY: CPT | Performed by: FAMILY MEDICINE

## 2019-10-14 PROCEDURE — 80048 BASIC METABOLIC PNL TOTAL CA: CPT | Performed by: FAMILY MEDICINE

## 2019-10-14 PROCEDURE — 84100 ASSAY OF PHOSPHORUS: CPT | Performed by: FAMILY MEDICINE

## 2019-10-14 RX ORDER — MECOBALAMIN 5000 MCG
15 TABLET,DISINTEGRATING ORAL
COMMUNITY
Start: 2019-10-11 | End: 2019-11-19

## 2019-10-14 RX ORDER — PROPAFENONE HYDROCHLORIDE 225 MG/1
CAPSULE, EXTENDED RELEASE ORAL
COMMUNITY
Start: 2019-09-09 | End: 2019-11-19

## 2019-10-14 ASSESSMENT — PAIN SCALES - GENERAL: PAINLEVEL: NO PAIN (0)

## 2019-10-14 ASSESSMENT — MIFFLIN-ST. JEOR: SCORE: 1106.47

## 2019-10-14 NOTE — LETTER
10/14/2019       RE: Marcia Kauffman  [id number] [address] [id number]     Dear Colleague,    Thank you for referring your patient, Marcia Kauffman, to the [facility] at [facility]. Please see a copy of my visit note below.    Marcia is a  68 year old female post menopausal] [GR0, P0] that presents today for osteoporosis follow up patient was last seen 8/2018. Took Forteo for 2 years finished in July.  Had a recent DXA.    Referring Physician: Nanette Beckford MD    HPI   Have you ever had a bone density test? Yes  Where = in [place]  When = 9/2019, 6/2017 [facility] , 2015 in [place]   Spine Tscore = unable - has hardware  Left neck Tscore = -3.3    Total left hip Tscore = -3.2 Loss 25%   Right neck Tscore = -3.2  Total Right hip Tscore = -3.3   Radius 33% -2.7   Have you received any x-ray dye or contrast in the last ten days? No  How many servings of dairy products do you consume per day? 3 Type: 2 cup soy milk, yogurt   Do you take a multi-vitamin daily? Yes  Do you take a vitamin D supplement? No  Do you take a calcium supplement daily?  2 tablets of bone support with magnesium  - 400mg/day calcium and vit D 400IU   Do you take a supplement containing strontium? No  Are you exposed to natural sunlight at least 20 minutes three times a week? Yes    Social History   reports that she has never smoked. She has never used smokeless tobacco. She reports current alcohol use. She reports that she does not use drugs.  Do you smoke cigarettes? No  Do you exercise? Yes. Details: walking - 1-2 miles/day   Do you drink alcohol? Yes. Details: few times/yr     Medication History  Have you used any of the following medications?   Actonel (Risedronate): No   Aredia (Pamidronate): No   Boniva (Ibindronate): No   Didronil (Etidronate): No   Evista (Raloxifene): No   Fosamax (Alendronate): No   Forteo (Parathyroid hormone)  injections: 7/2017-7/2019    HCTZ (Thiazide): No   Calcitonin nasal spray: No   Reclast or Zometa (Zolendronate): No   Prolia (Denosumab): No    Current Outpatient Medications   Medication Sig Dispense Refill     LANsoprazole (PREVACID) 15 MG DR capsule Take 15 mg by mouth       propafenone (RYTHMOL SR) 225 MG 12 hr capsule Take 1 capsule by mouth every 12 hours.       Acetaminophen (TYLENOL PO)        apixaban ANTICOAGULANT (ELIQUIS) 5 MG tablet Take 5 mg by mouth       CALCIUM-VITAMIN D PO        Coenzyme Q10 (CO Q 10 PO) Take 1 chew tab by mouth every morning        DAILY MULTI VITAMIN/MINERALS OR 1 TABLET DAILY AT BREAKFAST ON HOLD FOR SURGERY SINCE 05/01/2018       GLUCOSAMINE CHONDR 500 COMPLEX OR CAPS 1 capsule daily at noon       insulin pen needle (B-D U/F) 31G X 5 MM Use once daily as directed with Forteo 100 each 3     magnesium citrate solution Take 296 mLs by mouth At Bedtime       metoprolol succinate (TOPROL-XL) 25 MG 24 hr tablet Take 25 mg by mouth as needed       Omega-3 Fatty Acids (OMEGA-3 FISH OIL) 1200 MG CAPS Take 1,200 mg by mouth 2 times daily (with meals) ON HOLD FOR SURGERY SINCE 05/01/2018       Probiotic Product (PROBIOTIC PO) Take by mouth every other day       teriparatide, recombinant, (FORTEO) 600 MCG/2.4ML SOLN injection Inject 0.08 mLs (20 mcg) Subcutaneous daily 2.4 mL 11     UNABLE TO FIND Apply 0.25 tsp. topically 2 times daily MEDICATION NAME: Bio- estrol, plant based hormone replacement therapy        UNCLASSIFIED OTC PRODUCT MISC RelaxaMag  1 capsule at bedtime as needed -  a unique magnesium supplement that helps improve sleep and stress tolerance in the evening       valACYclovir (VALTREX) 500 MG tablet Take 1 tablet (500 mg) by mouth 2 times daily For 3 days when flares occur (Patient taking differently: Take 500 mg by mouth every morning For 3 days when flares occur) 30 tablet 3     VITAMIN C 500 MG OR TABS 2 tab daily AT BREAKFAST       Allergies   Allergen Reactions      "Dust Mite Extract      Escitalopram Unknown     Nausea and headache     Hydrocodone Nausea and Vomiting     n/v     Meperidine Nausea and Vomiting     vomiting  vomiting  vomiting     Mold      Trees      Past Medical History  Family History   Problem Relation Age of Onset     Breast Cancer Mother         cancer -free for 17 years, XRT     Arthritis Mother      Eye Disorder Mother         macular degeneration     Heart Disease Mother         murmur     Cerebrovascular Disease Mother      Diabetes Father              Arthritis Father      Arthritis Sister      Blood Disease Paternal Grandfather      Family History Negative No family hx of      ROS:  General: hot flashes  Head/Eyes: spots/floaters  Ears/Nose/Throat: none  Cardiovascular: palpitations and murmurs  Respiratory: none  Gastrointestinal: constipation  Breast: none  Genitourinary: incontinence and vaginal dryness  Sexual Function: lowered libido  Musculoskeletal: joint pain, stiffness, muscle weakness/cramps and back pain  Skin: none  Neurological: none  Mental Health: anxiety  Endocrine: none    Clinic Measurements  Vitals: /76   Pulse 94   Ht 1.651 m (5' 5\")   Wt 58.6 kg (129 lb 1.6 oz)   BMI 21.48 kg/m     BMI= Body mass index is 21.48 kg/m .    Physical exam  Constitutional: Well appearing woman in no acute distress.   Psychological: appropriate mood.  Neck: No thyroidmegaly.  no carotid bruits.  Cardiovascular: regular rate and rhythm, normal S1 and S2, no murmurs, rubs or gallops, peripheral pulses full and symmetric   Respiratory: clear to auscultation, no wheezes or crackles, normal breath sounds.  Musculoskeletal: limited  range of motion, no edema and motor strength is equal in the upper and lower extremities    Spine: Straight, not tender, Flexion adequate, Extension adequate, Lateral movement adequate, Rotational movement adequate  Skin: no concerning lesions, no jaundice.  Neurological: cranial nerves intact, normal strength, " reflexes at patella and biceps normal, normal gait, no tremor.     LAB  Vertebra; Fracture Assessment: NA  Dexa Scan: 9/2019 - at [place]   Vit D =55 11/2018    FRAX Assessment Tool: [N/A, was on Forteo  Risk Factors: Age, PM, T score of radius     ASSESSMENT:  This is a 68-year-old postmenopausal female who has osteoporosis by T score of her radius.  She has  completed 2 years of Forteo in July 2019.  She had a recent DEXA which shows a loss of 25% of the bone in the hips.  This is surprising since the T score in the radius has improved.  At this time no Z scores suggest a secondary cause but will look again for a secondary cause and will discuss with endocrine how to proceed.  My initial thought was to change to Prolia and  get another bone density in a year to make sure that it is working.    PLAN:  We will probably do prolia  I will get back to you  Blood work today  Collect 24 hours of urine and bring to the lab  Use phone for notification - my chart not working for her     I spent a total of 30 minutes face-to-face with the patient during today's office visit.  Over 50% of this time was spent counseling the patient and/or coordinating care regarding review of DXA, calcium vitamin D and medications..  See note for details.    Thank you for allowing me to participate in the care of your patient.  Please do not hesitate to call with questions or concerns.    Sincerely,    Alexia Rogers MD, PhD  Nanette Beckford MD

## 2019-10-14 NOTE — PATIENT INSTRUCTIONS
We will probably do prolia  I will get back to you  Blood work today  Collect 24 hours of urine and bring to the lab  Use phone for notification - my chart not working for her

## 2019-10-14 NOTE — PROGRESS NOTES
Marcia is a  68 year old female post menopausal] [GR0, P0] that presents today for osteoporosis follow up patient was last seen 8/2018. Took Forteo for 2 years finished in July.  Had a recent DXA.    Referring Physician: Nanette Beckford MD    HPI     Have you ever had a bone density test? Yes  Where = in [place]  When = 9/2019, 6/2017 [facility] , 2015 in [place]   Spine Tscore = unable - has hardware  Left neck Tscore = -3.3    Total left hip Tscore = -3.2 Loss 25%   Right neck Tscore = -3.2  Total Right hip Tscore = -3.3   Radius 33% -2.7   Have you received any x-ray dye or contrast in the last ten days? No  How many servings of dairy products do you consume per day? 3 Type: 2 cup soy milk, yogurt   Do you take a multi-vitamin daily? Yes  Do you take a vitamin D supplement? No  Do you take a calcium supplement daily?  2 tablets of bone support with magnesium  - 400mg/day calcium and vit D 400IU   Do you take a supplement containing strontium? No  Are you exposed to natural sunlight at least 20 minutes three times a week? Yes    Social History   reports that she has never smoked. She has never used smokeless tobacco. She reports current alcohol use. She reports that she does not use drugs.  Do you smoke cigarettes? No  Do you exercise? Yes. Details: walking - 1-2 miles/day   Do you drink alcohol? Yes. Details: few times/yr     Medication History  Have you used any of the following medications?   Actonel (Risedronate): No   Aredia (Pamidronate): No   Boniva (Ibindronate): No   Didronil (Etidronate): No   Evista (Raloxifene): No   Fosamax (Alendronate): No   Forteo (Parathyroid hormone) injections: 7/2017-7/2019    HCTZ (Thiazide): No   Calcitonin nasal spray: No   Reclast or Zometa (Zolendronate): No   Prolia (Denosumab): No    Current Outpatient Medications   Medication Sig Dispense Refill     LANsoprazole (PREVACID) 15 MG DR capsule Take 15 mg by mouth       propafenone (RYTHMOL SR) 225  MG 12 hr capsule Take 1 capsule by mouth every 12 hours.       Acetaminophen (TYLENOL PO)        apixaban ANTICOAGULANT (ELIQUIS) 5 MG tablet Take 5 mg by mouth       CALCIUM-VITAMIN D PO        Coenzyme Q10 (CO Q 10 PO) Take 1 chew tab by mouth every morning        DAILY MULTI VITAMIN/MINERALS OR 1 TABLET DAILY AT BREAKFAST ON HOLD FOR SURGERY SINCE 05/01/2018       GLUCOSAMINE CHONDR 500 COMPLEX OR CAPS 1 capsule daily at noon       insulin pen needle (B-D U/F) 31G X 5 MM Use once daily as directed with Forteo 100 each 3     magnesium citrate solution Take 296 mLs by mouth At Bedtime       metoprolol succinate (TOPROL-XL) 25 MG 24 hr tablet Take 25 mg by mouth as needed       Omega-3 Fatty Acids (OMEGA-3 FISH OIL) 1200 MG CAPS Take 1,200 mg by mouth 2 times daily (with meals) ON HOLD FOR SURGERY SINCE 05/01/2018       Probiotic Product (PROBIOTIC PO) Take by mouth every other day       teriparatide, recombinant, (FORTEO) 600 MCG/2.4ML SOLN injection Inject 0.08 mLs (20 mcg) Subcutaneous daily 2.4 mL 11     UNABLE TO FIND Apply 0.25 tsp. topically 2 times daily MEDICATION NAME: Bio- estrol, plant based hormone replacement therapy        UNCLASSIFIED OTC PRODUCT MISC RelaxaMag  1 capsule at bedtime as needed -  a unique magnesium supplement that helps improve sleep and stress tolerance in the evening       valACYclovir (VALTREX) 500 MG tablet Take 1 tablet (500 mg) by mouth 2 times daily For 3 days when flares occur (Patient taking differently: Take 500 mg by mouth every morning For 3 days when flares occur) 30 tablet 3     VITAMIN C 500 MG OR TABS 2 tab daily AT BREAKFAST            Allergies   Allergen Reactions     Dust Mite Extract      Escitalopram Unknown     Nausea and headache     Hydrocodone Nausea and Vomiting     n/v     Meperidine Nausea and Vomiting     vomiting  vomiting  vomiting     Mold      Trees        Past Medical History    Family History   Problem Relation Age of Onset     Breast Cancer Mother   "       cancer -free for 17 years, XRT     Arthritis Mother      Eye Disorder Mother         macular degeneration     Heart Disease Mother         murmur     Cerebrovascular Disease Mother      Diabetes Father              Arthritis Father      Arthritis Sister      Blood Disease Paternal Grandfather      Family History Negative No family hx of        ROS:  General: hot flashes  Head/Eyes: spots/floaters  Ears/Nose/Throat: none  Cardiovascular: palpitations and murmurs  Respiratory: none  Gastrointestinal: constipation  Breast: none  Genitourinary: incontinence and vaginal dryness  Sexual Function: lowered libido  Musculoskeletal: joint pain, stiffness, muscle weakness/cramps and back pain  Skin: none  Neurological: none  Mental Health: anxiety  Endocrine: none    Clinic Measurements  Vitals: /76   Pulse 94   Ht 1.651 m (5' 5\")   Wt 58.6 kg (129 lb 1.6 oz)   BMI 21.48 kg/m    BMI= Body mass index is 21.48 kg/m .    Physical exam  Constitutional: Well appearing woman in no acute distress.   Psychological: appropriate mood.  Neck: No thyroidmegaly.  no carotid bruits.  Cardiovascular: regular rate and rhythm, normal S1 and S2, no murmurs, rubs or gallops, peripheral pulses full and symmetric   Respiratory: clear to auscultation, no wheezes or crackles, normal breath sounds.  Musculoskeletal: limited  range of motion, no edema and motor strength is equal in the upper and lower extremities    Spine: Straight, not tender, Flexion adequate, Extension adequate, Lateral movement adequate, Rotational movement adequate  Skin: no concerning lesions, no jaundice.  Neurological: cranial nerves intact, normal strength, reflexes at patella and biceps normal, normal gait, no tremor.     LAB  Vertebra; Fracture Assessment: NA  Dexa Scan: 2019 - at [place]   Vit D =55 2018    FRAX Assessment Tool: [N/A, was on Forteo  Risk Factors: Age, PM, T score of radius     ASSESSMENT:  This is a 68-year-old postmenopausal " female who has osteoporosis by T score of her radius.  She has  completed 2 years of Forteo in July 2019.  She had a recent DEXA which shows a loss of 25% of the bone in the hips.  This is surprising since the T score in the radius has improved.  At this time no Z scores suggest a secondary cause but will look again for a secondary cause and will discuss with endocrine how to proceed.  My initial thought was to change to Prolia and  get another bone density in a year to make sure that it is working.    PLAN:  We will probably do prolia  I will get back to you  Blood work today  Collect 24 hours of urine and bring to the lab  Use phone for notification - my chart not working for her     I spent a total of 30 minutes face-to-face with the patient during today's office visit.  Over 50% of this time was spent counseling the patient and/or coordinating care regarding review of DXA, calcium vitamin D and medications..  See note for details.    Thank you for allowing me to participate in the care of your patient.  Please do not hesitate to call with questions or concerns.    Sincerely,    Alexia Rogers MD, PhD  Nanette Srivastava, MD Ken Nguyen, Alexia Oshea MD PhD             Certainly the forteo should be locked in with the prolia or reclast (I prefer reclast) - I am not sure why the bone density dropped on the forteo - you may want to have the DXA rechecked somewhere else  and see if the bone density remains low and to make sure this isn't a machine/calibration issue      Patient sent a my chart note to choose either IV reclast or prolia  Alexia Rogers MD, PhD    10-21-19 talked to patient and she will go with IV reclast - wants to wait till early Nov - Dr Bills is aware of this.  Alexia Rogers MD, PhD

## 2019-10-15 LAB
ALBUMIN SERPL ELPH-MCNC: 4.5 G/DL (ref 3.7–5.1)
ALP BONE SERPL-MCNC: 15.7 UG/L
ALPHA1 GLOB SERPL ELPH-MCNC: 0.3 G/DL (ref 0.2–0.4)
ALPHA2 GLOB SERPL ELPH-MCNC: 0.7 G/DL (ref 0.5–0.9)
B-GLOBULIN SERPL ELPH-MCNC: 0.8 G/DL (ref 0.6–1)
DEPRECATED CALCIDIOL+CALCIFEROL SERPL-MC: 49 UG/L (ref 20–75)
GAMMA GLOB SERPL ELPH-MCNC: 1.3 G/DL (ref 0.7–1.6)
M PROTEIN SERPL ELPH-MCNC: 0 G/DL
PROT PATTERN SERPL ELPH-IMP: NORMAL

## 2019-10-18 DIAGNOSIS — M81.0 SENILE OSTEOPOROSIS: ICD-10-CM

## 2019-10-18 LAB
CALCIUM 24H UR-MRATE: 0.14 G/24 H (ref 0.1–0.3)
CALCIUM UR-MCNC: 6.6 MG/DL
CALCIUM/CREAT UR: 0.18 G/G CR
COLLECT DURATION TIME UR: 24 H
CREAT 24H UR-MRATE: 0.8 G/(24.H) (ref 0.8–1.8)
CREAT UR-MCNC: 37 MG/DL
SPECIMEN VOL UR: 2150 ML

## 2019-10-18 PROCEDURE — 81050 URINALYSIS VOLUME MEASURE: CPT | Performed by: FAMILY MEDICINE

## 2019-10-18 PROCEDURE — 82340 ASSAY OF CALCIUM IN URINE: CPT | Performed by: FAMILY MEDICINE

## 2019-10-21 ENCOUNTER — HOSPITAL ENCOUNTER (OUTPATIENT)
Dept: PHYSICAL THERAPY | Facility: CLINIC | Age: 69
Setting detail: THERAPIES SERIES
End: 2019-10-21
Attending: ORTHOPAEDIC SURGERY
Payer: COMMERCIAL

## 2019-10-21 PROCEDURE — 97110 THERAPEUTIC EXERCISES: CPT | Mod: GP | Performed by: PHYSICAL THERAPIST

## 2019-10-24 ENCOUNTER — TELEPHONE (OUTPATIENT)
Dept: PHARMACY | Facility: CLINIC | Age: 69
End: 2019-10-24

## 2019-10-24 RX ORDER — ZOLEDRONIC ACID 5 MG/100ML
5 INJECTION, SOLUTION INTRAVENOUS ONCE
Status: CANCELLED
Start: 2019-10-24

## 2019-10-24 RX ORDER — HEPARIN SODIUM (PORCINE) LOCK FLUSH IV SOLN 100 UNIT/ML 100 UNIT/ML
5 SOLUTION INTRAVENOUS
Status: CANCELLED | OUTPATIENT
Start: 2019-10-24

## 2019-10-24 RX ORDER — HEPARIN SODIUM,PORCINE 10 UNIT/ML
5 VIAL (ML) INTRAVENOUS
Status: CANCELLED | OUTPATIENT
Start: 2019-10-24

## 2019-10-24 NOTE — TELEPHONE ENCOUNTER
----- Message from Aelxia Rogers MD PhD sent at 10/22/2019  1:58 PM CDT -----  Regarding: IV reclast  Tracy Srivastava said she prefers IV reclast to lock in the gain from Forteo.  I talked to the patient and she preferred IV reclast, not the prolia.  Alexia   ----- Message -----  From: Osiris Bills [facility]  Sent: 10/22/2019   1:06 PM CDT  To: Alexia Rogers MD PhD    Alexia --    I want to confirm this.  Your visit note from 10/14 says you'd probably do Prolia.  I don't see any other phone notes, so I just wanted to confirm that Reclast is the plan?      ----- Message -----  From: Alexia Rogers MD PhD  Sent: 10/21/2019  10:08 AM CDT  To: Osiris Bills [facility]    Osiris  This patient wants to do IV reclast in early Nov.  If you could set that up please  Thanks  Alexia

## 2019-10-24 NOTE — TELEPHONE ENCOUNTER
Ordered reclast per communication below.  Left VM for patient -- will send mychart message.    Osiris Bills, Pharm.D., BCPS

## 2019-10-31 ENCOUNTER — MEDICAL CORRESPONDENCE (OUTPATIENT)
Dept: HEALTH INFORMATION MANAGEMENT | Facility: CLINIC | Age: 69
End: 2019-10-31

## 2019-11-05 ENCOUNTER — HOSPITAL ENCOUNTER (OUTPATIENT)
Dept: PHYSICAL THERAPY | Facility: CLINIC | Age: 69
Setting detail: THERAPIES SERIES
End: 2019-11-05
Attending: ORTHOPAEDIC SURGERY
Payer: COMMERCIAL

## 2019-11-05 PROCEDURE — 97110 THERAPEUTIC EXERCISES: CPT | Mod: GP | Performed by: PHYSICAL THERAPIST

## 2019-11-05 NOTE — PROGRESS NOTES
"   OUTPATIENT PHYSICAL THERAPY PROGRESS NOTE    11/05/19 0900   Signing Clinician's Name / Credentials   Signing clinician's name / credentials Casandra Luisana, PT [id number]   Session Number   Session Number 4 germain   Progress Note/Recertification   Progress Note Due Date 12/05/19   Progress Note Completed Date 11/05/19   Recertification Due Date 12/05/19   Ortho Goal 1   Goal Description 1. Pt will be able to consistently go upstairs reciprocal w/ 1 rail..  10/21/19 does it 90% of the time may do reciprocal at end of the day.  MET   Target Date 10/11/19   Ortho Goal 2   Goal Description 2.  Pt will have increased strength sit to stand .  10/21/19 feels it is about the same.   11/5/19 about the same.   Target Date 10/31/19   Ortho Goal 3   Goal Description 3.  Pt will have increased ease carrying 10-15# for laundry/ groceries.  10/21/19 on 10# restriction due to heart ablation.   Target Date 10/31/19   Ortho Goal 4   Goal Description 4.  Pt will be independent and consistent w/HEP.  10/21/19 consistent w/ ex  11/5/19 MET   Target Date 10/31/19   Subjective Report   Subjective Report Pt under alot of stress w/ spouse diagnosis of cancer.   Pt cont to feel congested / pressure in chest and is undergoing additional tests. Pt states she feels difficulty w/ breathing since ablation.   Pt plans to see ortho re: R knee.  Pt states L hip is ok--notes she is a little more sore w/ exercise and cleaning house after family stayed over.  Pt also notes back is bothering as she sneezed in bed this morning.     Objective Measure    Objective Measure MMT  hip abd R 5/5, L 5-/5   Therapeutic Procedure/exercise   Treatment Detail Seated HS stretch 30\" B.   Seated piriformis stretch 30\" B.  Clam X 10 B.  Hip abd in SL X 10 B  .   Bridges  X 10.  Standing w/ B UE support w/ mirror for feedback:  marching and hip abd X 10 each B.  Sit to stand to plinth distal thigh w/o UE assist X 15.  4\" lateral step ups w/ B UE support X 15 B.  Standing " "gastroc stretch 30\" B.   (SLR and HS curls on own.)   Plan   Home program ex as above, self massage, ice prn   Plan  Pt to cont on own w/ HEP.  Chart open X 30 days in case pt needs to return for additional treatment   Comments   Comments Progress towards goals as noted above     RECERTIFICATION    Marcia Kauffman  1950    Session Number: 4/ 4 planned [facility] since start of care.    Reasons for Continuing Treatment:   To progress ex program / decrease pain    Frequency/Duration  1 visit today and plans to cont on own.  Pt will return in next 30 days (for 1-2 sessions) if she has further problems    Recertification Period  10/31/19 - 12/5/19    Physician Signature:    Date:    X_______________________________________________________    Physician Name: Claudy Nixon MD    I certify the need for these services furnished under this plan of treatment and while under my care. Physician co-signature of this document indicates review and certification of the therapy plan.  This signature may be written on paper, or electronically signed within EPIC.     "

## 2019-11-12 ENCOUNTER — TELEPHONE (OUTPATIENT)
Dept: PHARMACY | Facility: CLINIC | Age: 69
End: 2019-11-12

## 2019-11-12 NOTE — TELEPHONE ENCOUNTER
Patient called to discuss Reclast infusion, because she was unable to get into mychart.  Verbally provided patient all the educational info from my mychart message from 10/24/19.   Encouraged patient to schedule Reclast soon;  Patient has a lot going on right now, as  was recently diagnosed with lymphoma.      Patient agreeable to plan and will schedule reclast.    Osiris Bills, Andre.D., BCPS

## 2019-11-19 ENCOUNTER — INFUSION THERAPY VISIT (OUTPATIENT)
Dept: INFUSION THERAPY | Facility: CLINIC | Age: 69
End: 2019-11-19
Attending: FAMILY MEDICINE
Payer: COMMERCIAL

## 2019-11-19 VITALS
SYSTOLIC BLOOD PRESSURE: 120 MMHG | HEART RATE: 79 BPM | RESPIRATION RATE: 16 BRPM | DIASTOLIC BLOOD PRESSURE: 69 MMHG | OXYGEN SATURATION: 96 % | TEMPERATURE: 97.9 F

## 2019-11-19 DIAGNOSIS — M81.0 OSTEOPOROSIS, UNSPECIFIED OSTEOPOROSIS TYPE, UNSPECIFIED PATHOLOGICAL FRACTURE PRESENCE: Primary | ICD-10-CM

## 2019-11-19 PROCEDURE — 25800030 ZZH RX IP 258 OP 636: Performed by: FAMILY MEDICINE

## 2019-11-19 PROCEDURE — 25000128 H RX IP 250 OP 636: Performed by: FAMILY MEDICINE

## 2019-11-19 PROCEDURE — 96374 THER/PROPH/DIAG INJ IV PUSH: CPT

## 2019-11-19 PROCEDURE — 96365 THER/PROPH/DIAG IV INF INIT: CPT

## 2019-11-19 RX ORDER — HEPARIN SODIUM,PORCINE 10 UNIT/ML
5 VIAL (ML) INTRAVENOUS
Status: CANCELLED | OUTPATIENT
Start: 2019-11-19

## 2019-11-19 RX ORDER — ZOLEDRONIC ACID 5 MG/100ML
5 INJECTION, SOLUTION INTRAVENOUS ONCE
Status: COMPLETED | OUTPATIENT
Start: 2019-11-19 | End: 2019-11-19

## 2019-11-19 RX ORDER — HEPARIN SODIUM (PORCINE) LOCK FLUSH IV SOLN 100 UNIT/ML 100 UNIT/ML
5 SOLUTION INTRAVENOUS
Status: CANCELLED | OUTPATIENT
Start: 2019-11-19

## 2019-11-19 RX ORDER — ZOLEDRONIC ACID 5 MG/100ML
5 INJECTION, SOLUTION INTRAVENOUS ONCE
Status: CANCELLED
Start: 2019-11-19

## 2019-11-19 RX ADMIN — ZOLEDRONIC ACID 5 MG: 5 INJECTION, SOLUTION INTRAVENOUS at 14:15

## 2019-11-19 RX ADMIN — SODIUM CHLORIDE 250 ML: 9 INJECTION, SOLUTION INTRAVENOUS at 14:12

## 2019-11-19 NOTE — PROGRESS NOTES
Pt here for first dose Reclast.  Given handout on med.      Infused over 15 min via P IV.  200 ml NS infused for hydration.    Discharged to home w/ .  F/U w/ primary.

## 2019-11-26 ENCOUNTER — HOSPITAL ENCOUNTER (OUTPATIENT)
Dept: PHYSICAL THERAPY | Facility: CLINIC | Age: 69
Setting detail: THERAPIES SERIES
End: 2019-11-26
Attending: ORTHOPAEDIC SURGERY
Payer: COMMERCIAL

## 2019-11-26 PROCEDURE — 97110 THERAPEUTIC EXERCISES: CPT | Mod: GP | Performed by: PHYSICAL THERAPIST

## 2019-12-10 NOTE — PROGRESS NOTES
"   OUTPATIENT PHYSICAL THERAPY DISCHARGE SUMMARY   Claudy Nixon MD 9/11/19 to 11/26/19 1500   Signing Clinician's Name / Credentials   Signing clinician's name / credentials Casandra Lieberman, PT [id number]   Session Number   Session Number 5 planned [facility]   Ortho Goal 1   Goal Description 1. Pt will be able to consistently go upstairs reciprocal w/ 1 rail..  10/21/19 does it 90% of the time may do reciprocal at end of the day.  MET   Target Date 10/11/19   Ortho Goal 2   Goal Description 2.  Pt will have increased strength sit to stand .  10/21/19 feels it is about the same.   11/5/19 about the same..  11/26/19 pt reports feeling stronger.    Target Date 10/31/19   Ortho Goal 3   Goal Description 3.  Pt will have increased ease carrying 10-15# for laundry/ groceries.  10/21/19 on 10# restriction due to heart ablation..  11/26/19 MET   Target Date 10/31/19   Ortho Goal 4   Goal Description 4.  Pt will be independent and consistent w/HEP.  10/21/19 consistent w/ ex  11/5/19 MET   Target Date 10/31/19   Subjective Report   Subjective Report Pt states she had a fall on 11/2  has since seen chiro X 2 for adjustments.  L hip pain typically 3/10 but can increase to 7/10.     Objective Measure    Objective Measure MMT  hip abd R 5/5, L 4+/5;  B hip flex 5/5   Therapeutic Procedure/exercise   Treatment Detail Clam X 20 B.  Hip abd in SL X 15 B  .   Bridges  w/ YTB X 15.  Standing w/  UE support and  mirror for feedback:  marching X 15 B and hip abd X 12  B.  Sit to stand to plinth distal thigh w/o UE assist X 15.  4\" lateral step ups w/ B UE support X 15 B.  Standing gastroc stretch 30\" B.   (seated HS/ piriformis stretch, SLR and HS curls on own.)   Plan   Home program ex as above, self massage, ice prn   Plan  Pt to cont on own w/HEP.  Discharge from physical therapy   Comments   Comments Pt met goals 1,3 and 4; making good progress towards 2.     "

## 2019-12-16 ENCOUNTER — HEALTH MAINTENANCE LETTER (OUTPATIENT)
Age: 69
End: 2019-12-16

## 2020-05-06 ENCOUNTER — ANCILLARY PROCEDURE (OUTPATIENT)
Dept: GENERAL RADIOLOGY | Facility: CLINIC | Age: 70
End: 2020-05-06
Attending: NEUROLOGICAL SURGERY
Payer: COMMERCIAL

## 2020-05-06 DIAGNOSIS — M40.30 FLATBACK SYNDROME: ICD-10-CM

## 2020-05-07 ENCOUNTER — VIRTUAL VISIT (OUTPATIENT)
Dept: NEUROSURGERY | Facility: CLINIC | Age: 70
End: 2020-05-07
Payer: COMMERCIAL

## 2020-05-07 DIAGNOSIS — Z98.1 S/P LUMBAR FUSION: ICD-10-CM

## 2020-05-07 DIAGNOSIS — M40.30 FLATBACK SYNDROME: Primary | ICD-10-CM

## 2020-05-07 RX ORDER — METOPROLOL TARTRATE 25 MG/1
12.5 TABLET, FILM COATED ORAL 2 TIMES DAILY
COMMUNITY
Start: 2019-12-30

## 2020-05-07 NOTE — PROGRESS NOTES
"Marcia Kauffman is a 69 year old female who is being evaluated via a billable video visit.      The patient has been notified of following:     \"This video visit will be conducted via a call between you and your physician/provider. We have found that certain health care needs can be provided without the need for an in-person physical exam.  This service lets us provide the care you need with a video conversation.  If a prescription is necessary we can send it directly to your pharmacy.  If lab work is needed we can place an order for that and you can then stop by our lab to have the test done at a later time.    Video visits are billed at different rates depending on your insurance coverage.  Please reach out to your insurance provider with any questions.    If during the course of the call the physician/provider feels a video visit is not appropriate, you will not be charged for this service.\"    Patient has given verbal consent for Video visit? Yes    How would you like to obtain your AVS? Mail a copy    Patient would like the video invitation sent by: Text to cell phone: [phone]    Will anyone else be joining your video visit? No        Video-Visit Details    Type of service:  Video Visit    Video Start Time:11:42AM  Video End Time: 11:56 AM    Originating Location (pt. Location): Home    Distant Location (provider location):  [facility]     Platform used for Video Visit: "[employer]      This is a video visit conducted due to systemwide and statewide restrictions on non-urgent clinic visits due to COVID-19 pandemic concerns. The patient did not identify any urgent or red-flag symptoms that would require in-person evaluation.    History of Present Illness:  It was a pleasure to evaluate Marcia Kauffman in scheduled postop followup s/p complex spinal deformity correction surgery on 5/14/18 (Shakeel/Katya):   Lumbar 2 To Sacral 1 Posterior Instrumented Fusion, Pelvic Fixation, Revision Of Lumbar 4-5 " Instrumentation, Transforaminal Interbody Fusion with bone morphogenic protein, Oshea-Limon Osteotomies,  Lumbar 2-3, 3-4, 4-5         Relevant History-  Marcia Kauffman is a 67 year old female initially presenting with low back pain and standing intolerance. Her pain is exacerbated by standing and walking and relieved minorly by rest. She's had no significant relief with PT. She has bilateral leg aching but no radiating leg pain. Her right foot feels somewhat weak and she often trips on that foot.     She had a workup of left thigh numbness including EMG at the [facility] showing neuralgia paresthetica and she does have a tender spot or her inguinal crease on the left reproduce her pain. I previously had evaluated her while at [facility] for lumbar flat back deformity with significant pelvic incidence to lumbar lordosis mismatch of greater than 30 . She has a fixed deformity of her spine with no flexibility in supine imaging. 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 Leo Dixon, likely as a secondary consequence of her sagittal spine malalignment. She also has right SI joint pain.     She has long-standing neck pain exacerbated by neck extension that has been evaluated with MRI of her cervical spine does not show significant cord compression. She is aware that because of her neck pain and her resuscitation of laying prone for a long surgery to correct her spinal deformity, her neck pain might worsen.     The patient has osteoporosis with a T score of -3.3 and her radius on DEXA scan in June 2017. She was started on Forteo in July 2017 and has continued that for the full 24 month duration.    In the past year, Marcia reports that she has had worsening focal knee pain and is being scheduled for a knee arthroplasty.  She does not have progressive back pain. The upper end of her lumbar  spine feels tight if she does a lot of activity and she will take Tylenol and rest.    IMAGING:  Long cassette xrays 5/6/20 compared to preop show improvement in sagittal alignment, able to stand upright without hip and knee flexion;  Radiology queries possible lucency in left robert at L5      ASSESSMENT:  Doing well 2 years postop L2-pelvis fusion      PLAN:  followup on as-needed basis; Marcia would like to focus on getting her knee replacement surgery and recovering from that.  Marcia will call us if having significant back pain after recovery from her knee replacement surgery and at that time we would obtain CT scan lumbar spine without contrast to make sure no robert lucency at L5, and MRI lumbar spine with and without contrast to evaluate L1-L2 disc space, and I would see her back in my clinic for further evaluation.

## 2020-05-07 NOTE — PATIENT INSTRUCTIONS
If you are having significant back pain after recovering from your knee replacement surgery,  Please call Dr. Beckford' nurse Hussain Lora at [phone], and Hussain will place orders for MRI and CT scan and have you followup with Dr. Beckford in clinic when those are done.

## 2020-05-20 DIAGNOSIS — Z11.59 ENCOUNTER FOR SCREENING FOR OTHER VIRAL DISEASES: Primary | ICD-10-CM

## 2020-07-03 RX ORDER — CETIRIZINE HYDROCHLORIDE 10 MG/1
10 TABLET ORAL DAILY
COMMUNITY
End: 2021-10-18

## 2020-07-03 RX ORDER — VITAMIN E (DL,TOCOPHERYL ACET) 180 MG
CAPSULE ORAL
COMMUNITY

## 2020-07-07 ENCOUNTER — ANESTHESIA EVENT (OUTPATIENT)
Dept: SURGERY | Facility: CLINIC | Age: 70
End: 2020-07-07
Payer: COMMERCIAL

## 2020-07-07 DIAGNOSIS — Z11.59 ENCOUNTER FOR SCREENING FOR OTHER VIRAL DISEASES: ICD-10-CM

## 2020-07-07 LAB
SARS-COV-2 RNA SPEC QL NAA+PROBE: NOT DETECTED
SPECIMEN SOURCE: NORMAL

## 2020-07-07 PROCEDURE — U0003 INFECTIOUS AGENT DETECTION BY NUCLEIC ACID (DNA OR RNA); SEVERE ACUTE RESPIRATORY SYNDROME CORONAVIRUS 2 (SARS-COV-2) (CORONAVIRUS DISEASE [COVID-19]), AMPLIFIED PROBE TECHNIQUE, MAKING USE OF HIGH THROUGHPUT TECHNOLOGIES AS DESCRIBED BY CMS-2020-01-R: HCPCS | Performed by: ORTHOPAEDIC SURGERY

## 2020-07-07 PROCEDURE — 99207 ZZC NO CHARGE LOS: CPT

## 2020-07-07 NOTE — ANESTHESIA PREPROCEDURE EVALUATION
Anesthesia Pre-Procedure Evaluation    Patient: Marcia Kauffman   MRN: [id number] : 1950          Preoperative Diagnosis: Arthritis [M19.90]    Procedure(s):  ARTHROPLASTY, KNEE, TOTAL    Past Medical History:   Diagnosis Date     Endometriosis, site unspecified      Herpes simplex without mention of complication     HSV 2     MEDICAL HISTORY OF -     L4/5 spondololisthesis     PONV (postoperative nausea and vomiting)      Postmenopausal atrophic vaginitis 2004    Estring vag ring-stopped     Premature ventricular contractions 2007     Radial styloid tenosynovitis     arthritis in back and wrists     Spinal stenosis, lumbar region, without neurogenic claudication      Symptomatic menopausal or female climacteric states     prev on HRT-orthoprefest, effexor, paxil     Past Surgical History:   Procedure Laterality Date     ARTHROSCOPY KNEE RT/LT  2010    Right knee, torn meniscus     BREAST LUMPECTOMY, RT/LT  3/1991    Right benign lump     CHOLECYSTECTOMY, OPEN       COLONOSCOPY  2016     FUSION SPINE POSTERIOR TWO LEVELS Left 10/20/2015    Procedure: FUSION SPINE POSTERIOR TWO LEVELS;  Surgeon: Leo Dixon MD;  Location: [facility] OR      ABLATION PVC  2007     HC EXCIS TENDON SHEATH LESION FOREARM/WRIST  2006    treatment for frozen thumb     HYSTEROSCOPY  1987     LAPAROSCOPY DIAGNOSTIC (GYN)  1984    3 total scopes for fertility     OPTICAL TRACKING SYSTEM FUSION SPINE POSTERIOR LUMBAR THREE+ LEVELS N/A 2018    Procedure: OPTICAL TRACKING SYSTEM FUSION SPINE POSTERIOR LUMBAR THREE+ LEVELS;  O-Arm/Stealth Assisted Lumbar 2 To Sacral 1 Posterior Instrumented Fusion, Pelvic Fixation, Revision Of Lumbar 4-5 Instrumentation, Transforaminal Interbody Fusion with bone morphogenic protein, Oshea-Limon Osteotomies,  Lumbar 2-3, 3-4, 4-5;  Surgeon: Nanette Beckford MD;  Location:  OR     SURGICAL HISTORY OF -   95    Excision of cyst on back       Anesthesia Evaluation     .  Pt has had prior anesthetic.     History of anesthetic complications   - PONV        ROS/MED HX    ENT/Pulmonary:  - neg pulmonary ROS     Neurologic:  - neg neurologic ROS     Cardiovascular:     (+) ----. : . . . :. dysrhythmias a-fib, Irregular Heartbeat/Palpitations, . Previous cardiac testing Echodate:results:   [facility]  Echocardiography Laboratory  [address], [place] [address]        Name: MUKUND MENDOZA  MRN: [id number]  : 1950  Study Date: 2016 09:47 AM  Age: 66 yrs  Gender: Female  Patient Location: [facility]  Reason For Study: Rheumatic mitral valve disease  Ordering Physician: DARA CHAUDHARY  Referring Physician: DARA CHAUDHARY  Performed By: Damaris Laureano RDCS     BSA: 1.6 m2  Height: 64 in  Weight: 126 lb  HR: 67  BP: 109/60 mmHg  ______________________________________________________________________________        Procedure  Complete Echo Adult.  ______________________________________________________________________________     Interpretation Summary     Left ventricular systolic function is normal.  Grade II or moderate diastolic dysfunction.  No regional wall motion abnormalities noted.  ______________________________________________________________________________           Left Ventricle  The left ventricle is normal in size. There is normal left ventricular wall  thickness. Left ventricular systolic function is normal. The visual ejection  fraction is estimated at 60-65%. Grade II or moderate diastolic dysfunction.  E by E prime ratio is greater than 15, that likely suggests increased left  ventricular filling pressures. No regional wall motion abnormalities noted. A  false chord is noted (normal variant). There is prominent apical  trabeculation.     Right Ventricle  Borderline right ventricular enlargement. The right ventricular systolic  function is normal.  Atria  The left atrium is mildly dilated. Right atrium not well visualized.  The  right atrium is mildly dilated. Cannot exclude interatrial communication but  no significant shunts are seen on present images.     Mitral Valve  Both mitral leaflets appear thickened, the anterior leaflet more prominently  myxomatous. There is bileaflet prolapse. There is mild-moderate eccentric,  posteriorly directed mitral regurgitation.     Tricuspid Valve  The tricuspid valve is not well visualized, but is grossly normal. There is  mild to moderate (1-2+) tricuspid regurgitation. The right ventricular  systolic pressure is approximated at 24.7 mmHg plus the right atrial  pressure. Normal IVC (1.5-2.5cm) with >50% respiratory collapse; right atrial  pressure is estimated at 5-10mmHg.     Aortic Valve  The aortic valve is trileaflet. There is mild trileaflet aortic sclerosis. No  aortic regurgitation is present. No hemodynamically significant valvular  aortic stenosis.     Pulmonic Valve  The pulmonic valve is not well visualized. There is no pulmonic valvular  regurgitation. Normal pulmonic valve velocity.     Vessels  The aortic root is normal size. Normal size ascending aorta. The IVC is  normal in size and reactivity with respiration, suggesting normal central  venous pressure.  Pericardium  There is no pericardial effusion.     Rhythm  The rhythm was normal sinus.     ______________________________________________________________________________  MMode/2D Measurements & Calculations  IVSd: 1.0 cm  LVIDd: 4.1 cm  LVIDs: 2.8 cm  LVPWd: 1.1 cm  FS: 31.4 %  EDV(Teich): 73.4 ml  ESV(Teich): 29.5 ml  LV mass(C)d: 143.1 grams  Ao root diam: 3.2 cm  asc Aorta Diam: 2.5 cm           Doppler Measurements & Calculations  MV E max joaquin: 89.5 cm/sec  MV A max joaquin: 71.4 cm/sec  MV E/A: 1.3  MV dec slope: 299.6 cm/sec2  MV dec time: 0.30 sec  TR max joaquin: 248.6 cm/sec  TR max P.7 mmHg  Lateral E/e': 17.7  Medial E/e': 17.0        date: results: date: results: date: results:          METS/Exercise Tolerance:    "  Hematologic:  - neg hematologic  ROS       Musculoskeletal:  - neg musculoskeletal ROS       GI/Hepatic:  - neg GI/hepatic ROS       Renal/Genitourinary:  - ROS Renal section negative       Endo:  - neg endo ROS       Psychiatric:  - neg psychiatric ROS       Infectious Disease:  - neg infectious disease ROS       Malignancy:      - no malignancy   Other:                          Physical Exam  Normal systems: cardiovascular, pulmonary and dental    Airway   Mallampati: II  TM distance: >3 FB  Neck ROM: full    Dental     Cardiovascular       Pulmonary             Lab Results   Component Value Date    WBC 7.7 06/20/2018    HGB 13.1 10/14/2019    HCT 35.8 06/20/2018     06/20/2018    SED 15 01/17/2014     10/14/2019    POTASSIUM 4.1 10/14/2019    CHLORIDE 102 10/14/2019    CO2 33 (H) 10/14/2019    BUN 23 10/14/2019    CR 0.77 10/14/2019     (H) 10/14/2019    NAOMI 9.0 10/14/2019    PHOS 3.5 10/14/2019    MAG 2.1 10/14/2019    ALBUMIN 3.7 10/12/2015    PROTTOTAL 7.4 03/25/2015    ALT 29 03/25/2015    AST 24 03/25/2015    ALKPHOS 82 03/25/2015    BILITOTAL 0.7 03/25/2015    PTT 29 06/20/2018    INR 0.95 06/20/2018    FIBR 205 05/14/2018    TSH 2.74 04/04/2012       Preop Vitals  BP Readings from Last 3 Encounters:   11/19/19 120/69   10/14/19 128/76   05/16/19 120/41    Pulse Readings from Last 3 Encounters:   11/19/19 79   10/14/19 94   05/16/19 64      Resp Readings from Last 3 Encounters:   11/19/19 16   05/16/19 16   05/29/18 24    SpO2 Readings from Last 3 Encounters:   11/19/19 96%   05/16/19 98%   11/15/18 100%      Temp Readings from Last 1 Encounters:   11/19/19 36.6  C (97.9  F) (Oral)    Ht Readings from Last 1 Encounters:   10/14/19 1.651 m (5' 5\")      Wt Readings from Last 1 Encounters:   10/14/19 58.6 kg (129 lb 1.6 oz)    Estimated body mass index is 21.48 kg/m  as calculated from the following:    Height as of 10/14/19: 1.651 m (5' 5\").    Weight as of 10/14/19: 58.6 kg (129 lb 1.6 " oz).       Anesthesia Plan      History & Physical Review  History and physical reviewed and following examination; no interval change.    ASA Status:  2 .    NPO Status:  > 8 hours    Plan for Spinal and Peripheral Nerve Block with Intravenous and Propofol induction.   PONV prophylaxis:  Ondansetron (or other 5HT-3) and Dexamethasone or Solumedrol         Postoperative Care  Postoperative pain management:  IV analgesics and Oral pain medications.      Consents  Anesthetic plan, risks, benefits and alternatives discussed with:  Patient..                 Leroy Abel CRNA, APRN CRNA

## 2020-07-09 ENCOUNTER — APPOINTMENT (OUTPATIENT)
Dept: GENERAL RADIOLOGY | Facility: CLINIC | Age: 70
End: 2020-07-09
Attending: ORTHOPAEDIC SURGERY
Payer: COMMERCIAL

## 2020-07-09 ENCOUNTER — HOSPITAL ENCOUNTER (OUTPATIENT)
Facility: CLINIC | Age: 70
Discharge: HOME | End: 2020-07-10
Attending: ORTHOPAEDIC SURGERY | Admitting: ORTHOPAEDIC SURGERY
Payer: COMMERCIAL

## 2020-07-09 ENCOUNTER — ANESTHESIA (OUTPATIENT)
Dept: SURGERY | Facility: CLINIC | Age: 70
End: 2020-07-09
Payer: COMMERCIAL

## 2020-07-09 DIAGNOSIS — Z96.651 STATUS POST TOTAL RIGHT KNEE REPLACEMENT: Primary | ICD-10-CM

## 2020-07-09 DIAGNOSIS — Z96.651 S/P TOTAL KNEE REPLACEMENT, RIGHT: ICD-10-CM

## 2020-07-09 PROBLEM — Z98.890 S/P ABLATION OF ATRIAL FIBRILLATION: Status: ACTIVE | Noted: 2020-02-20

## 2020-07-09 PROBLEM — Z96.659 STATUS POST TOTAL KNEE REPLACEMENT: Status: ACTIVE | Noted: 2020-07-09

## 2020-07-09 PROBLEM — Z86.79 S/P ABLATION OF ATRIAL FIBRILLATION: Status: ACTIVE | Noted: 2020-02-20

## 2020-07-09 PROBLEM — I36.1 NONRHEUMATIC TRICUSPID VALVE REGURGITATION: Status: ACTIVE | Noted: 2020-02-20

## 2020-07-09 PROBLEM — I48.0 PAF (PAROXYSMAL ATRIAL FIBRILLATION) (H): Status: ACTIVE | Noted: 2020-02-20

## 2020-07-09 PROCEDURE — 36000063 ZZH SURGERY LEVEL 4 EA 15 ADDTL MIN: Performed by: ORTHOPAEDIC SURGERY

## 2020-07-09 PROCEDURE — 25800030 ZZH RX IP 258 OP 636: Performed by: NURSE ANESTHETIST, CERTIFIED REGISTERED

## 2020-07-09 PROCEDURE — 40000986 XR KNEE PORT RT 1/2 VW: Mod: RT

## 2020-07-09 PROCEDURE — 37000008 ZZH ANESTHESIA TECHNICAL FEE, 1ST 30 MIN: Performed by: ORTHOPAEDIC SURGERY

## 2020-07-09 PROCEDURE — 25000128 H RX IP 250 OP 636: Performed by: NURSE ANESTHETIST, CERTIFIED REGISTERED

## 2020-07-09 PROCEDURE — 27210794 ZZH OR GENERAL SUPPLY STERILE: Performed by: ORTHOPAEDIC SURGERY

## 2020-07-09 PROCEDURE — C1776 JOINT DEVICE (IMPLANTABLE): HCPCS | Performed by: ORTHOPAEDIC SURGERY

## 2020-07-09 PROCEDURE — 25000132 ZZH RX MED GY IP 250 OP 250 PS 637: Performed by: NURSE ANESTHETIST, CERTIFIED REGISTERED

## 2020-07-09 PROCEDURE — 27810169 ZZH OR IMPLANT GENERAL: Performed by: ORTHOPAEDIC SURGERY

## 2020-07-09 PROCEDURE — 71000012 ZZH RECOVERY PHASE 1 LEVEL 1 FIRST HR: Performed by: ORTHOPAEDIC SURGERY

## 2020-07-09 PROCEDURE — 40000305 ZZH STATISTIC PRE PROC ASSESS I: Performed by: ORTHOPAEDIC SURGERY

## 2020-07-09 PROCEDURE — 25000125 ZZHC RX 250: Performed by: ORTHOPAEDIC SURGERY

## 2020-07-09 PROCEDURE — 25000128 H RX IP 250 OP 636: Performed by: ORTHOPAEDIC SURGERY

## 2020-07-09 PROCEDURE — 36000093 ZZH SURGERY LEVEL 4 1ST 30 MIN: Performed by: ORTHOPAEDIC SURGERY

## 2020-07-09 PROCEDURE — 37000009 ZZH ANESTHESIA TECHNICAL FEE, EACH ADDTL 15 MIN: Performed by: ORTHOPAEDIC SURGERY

## 2020-07-09 PROCEDURE — 25000132 ZZH RX MED GY IP 250 OP 250 PS 637: Performed by: PHYSICIAN ASSISTANT

## 2020-07-09 PROCEDURE — 25000125 ZZHC RX 250: Performed by: NURSE ANESTHETIST, CERTIFIED REGISTERED

## 2020-07-09 PROCEDURE — 25800030 ZZH RX IP 258 OP 636: Performed by: ORTHOPAEDIC SURGERY

## 2020-07-09 PROCEDURE — 27110028 ZZH OR GENERAL SUPPLY NON-STERILE: Performed by: ORTHOPAEDIC SURGERY

## 2020-07-09 PROCEDURE — 25000128 H RX IP 250 OP 636: Performed by: PHYSICIAN ASSISTANT

## 2020-07-09 PROCEDURE — 25000132 ZZH RX MED GY IP 250 OP 250 PS 637: Performed by: ORTHOPAEDIC SURGERY

## 2020-07-09 DEVICE — IMP COMP PATELLA GENESIS II 9X35MM 71420578: Type: IMPLANTABLE DEVICE | Site: KNEE | Status: FUNCTIONAL

## 2020-07-09 DEVICE — BONE CEMENT PALACOS 00-1112-140-01: Type: IMPLANTABLE DEVICE | Site: KNEE | Status: FUNCTIONAL

## 2020-07-09 DEVICE — IMPLANTABLE DEVICE: Type: IMPLANTABLE DEVICE | Site: KNEE | Status: FUNCTIONAL

## 2020-07-09 DEVICE — IMP BASEPLATE TIBIAL GENESIS II SZ 4 RT TI 71420184: Type: IMPLANTABLE DEVICE | Site: KNEE | Status: FUNCTIONAL

## 2020-07-09 DEVICE — IMP INSERT TIB S&N LGN PS HI FLEX XLPE SZ3-4 9MM 71453211: Type: IMPLANTABLE DEVICE | Site: KNEE | Status: FUNCTIONAL

## 2020-07-09 RX ORDER — CEFAZOLIN SODIUM 2 G/100ML
2 INJECTION, SOLUTION INTRAVENOUS
Status: COMPLETED | OUTPATIENT
Start: 2020-07-09 | End: 2020-07-09

## 2020-07-09 RX ORDER — PROPOFOL 10 MG/ML
INJECTION, EMULSION INTRAVENOUS CONTINUOUS PRN
Status: DISCONTINUED | OUTPATIENT
Start: 2020-07-09 | End: 2020-07-09

## 2020-07-09 RX ORDER — METOPROLOL TARTRATE 25 MG/1
25 TABLET, FILM COATED ORAL DAILY
Status: DISCONTINUED | OUTPATIENT
Start: 2020-07-10 | End: 2020-07-09

## 2020-07-09 RX ORDER — HYDROXYZINE HYDROCHLORIDE 25 MG/1
25-50 TABLET, FILM COATED ORAL EVERY 6 HOURS PRN
Qty: 50 TABLET | Refills: 0 | Status: SHIPPED | OUTPATIENT
Start: 2020-07-09 | End: 2021-10-18

## 2020-07-09 RX ORDER — IBUPROFEN 600 MG/1
600 TABLET, FILM COATED ORAL EVERY 8 HOURS PRN
Qty: 30 TABLET | Refills: 0
Start: 2020-07-09 | End: 2021-10-18

## 2020-07-09 RX ORDER — ONDANSETRON 2 MG/ML
INJECTION INTRAMUSCULAR; INTRAVENOUS PRN
Status: DISCONTINUED | OUTPATIENT
Start: 2020-07-09 | End: 2020-07-09

## 2020-07-09 RX ORDER — PROCHLORPERAZINE MALEATE 5 MG
5 TABLET ORAL EVERY 6 HOURS PRN
Status: DISCONTINUED | OUTPATIENT
Start: 2020-07-09 | End: 2020-07-10 | Stop reason: HOSPADM

## 2020-07-09 RX ORDER — HYDROMORPHONE HYDROCHLORIDE 1 MG/ML
.3-.5 INJECTION, SOLUTION INTRAMUSCULAR; INTRAVENOUS; SUBCUTANEOUS
Status: DISCONTINUED | OUTPATIENT
Start: 2020-07-09 | End: 2020-07-10 | Stop reason: HOSPADM

## 2020-07-09 RX ORDER — ONDANSETRON 4 MG/1
4 TABLET, ORALLY DISINTEGRATING ORAL EVERY 30 MIN PRN
Status: DISCONTINUED | OUTPATIENT
Start: 2020-07-09 | End: 2020-07-09 | Stop reason: HOSPADM

## 2020-07-09 RX ORDER — HYDRALAZINE HYDROCHLORIDE 20 MG/ML
2.5-5 INJECTION INTRAMUSCULAR; INTRAVENOUS EVERY 10 MIN PRN
Status: DISCONTINUED | OUTPATIENT
Start: 2020-07-09 | End: 2020-07-09 | Stop reason: HOSPADM

## 2020-07-09 RX ORDER — NALOXONE HYDROCHLORIDE 0.4 MG/ML
.1-.4 INJECTION, SOLUTION INTRAMUSCULAR; INTRAVENOUS; SUBCUTANEOUS
Status: DISCONTINUED | OUTPATIENT
Start: 2020-07-09 | End: 2020-07-09

## 2020-07-09 RX ORDER — DEXAMETHASONE SODIUM PHOSPHATE 4 MG/ML
4 INJECTION, SOLUTION INTRA-ARTICULAR; INTRALESIONAL; INTRAMUSCULAR; INTRAVENOUS; SOFT TISSUE ONCE
Status: COMPLETED | OUTPATIENT
Start: 2020-07-09 | End: 2020-07-09

## 2020-07-09 RX ORDER — SODIUM CHLORIDE, SODIUM LACTATE, POTASSIUM CHLORIDE, CALCIUM CHLORIDE 600; 310; 30; 20 MG/100ML; MG/100ML; MG/100ML; MG/100ML
INJECTION, SOLUTION INTRAVENOUS CONTINUOUS
Status: DISCONTINUED | OUTPATIENT
Start: 2020-07-09 | End: 2020-07-09 | Stop reason: HOSPADM

## 2020-07-09 RX ORDER — MEPERIDINE HYDROCHLORIDE 25 MG/ML
12.5 INJECTION INTRAMUSCULAR; INTRAVENOUS; SUBCUTANEOUS EVERY 5 MIN PRN
Status: DISCONTINUED | OUTPATIENT
Start: 2020-07-09 | End: 2020-07-09 | Stop reason: HOSPADM

## 2020-07-09 RX ORDER — OXYCODONE HYDROCHLORIDE 5 MG/1
5-10 TABLET ORAL
Status: DISCONTINUED | OUTPATIENT
Start: 2020-07-09 | End: 2020-07-10 | Stop reason: HOSPADM

## 2020-07-09 RX ORDER — LIDOCAINE HYDROCHLORIDE 10 MG/ML
INJECTION, SOLUTION INFILTRATION; PERINEURAL PRN
Status: DISCONTINUED | OUTPATIENT
Start: 2020-07-09 | End: 2020-07-09

## 2020-07-09 RX ORDER — AMOXICILLIN 250 MG
1-2 CAPSULE ORAL DAILY PRN
Qty: 10 TABLET | Refills: 0 | Status: SHIPPED | OUTPATIENT
Start: 2020-07-09 | End: 2021-10-18

## 2020-07-09 RX ORDER — ACETAMINOPHEN 325 MG/1
975 TABLET ORAL EVERY 8 HOURS
Status: DISCONTINUED | OUTPATIENT
Start: 2020-07-09 | End: 2020-07-09

## 2020-07-09 RX ORDER — ONDANSETRON 2 MG/ML
4 INJECTION INTRAMUSCULAR; INTRAVENOUS EVERY 6 HOURS PRN
Status: DISCONTINUED | OUTPATIENT
Start: 2020-07-09 | End: 2020-07-10 | Stop reason: HOSPADM

## 2020-07-09 RX ORDER — VALACYCLOVIR HYDROCHLORIDE 500 MG/1
500 TABLET, FILM COATED ORAL DAILY
Status: DISCONTINUED | OUTPATIENT
Start: 2020-07-10 | End: 2020-07-10 | Stop reason: HOSPADM

## 2020-07-09 RX ORDER — GABAPENTIN 300 MG/1
300 CAPSULE ORAL ONCE
Status: COMPLETED | OUTPATIENT
Start: 2020-07-09 | End: 2020-07-09

## 2020-07-09 RX ORDER — TRANEXAMIC ACID 650 MG/1
1950 TABLET ORAL ONCE
Status: COMPLETED | OUTPATIENT
Start: 2020-07-09 | End: 2020-07-09

## 2020-07-09 RX ORDER — SODIUM CHLORIDE, SODIUM LACTATE, POTASSIUM CHLORIDE, CALCIUM CHLORIDE 600; 310; 30; 20 MG/100ML; MG/100ML; MG/100ML; MG/100ML
INJECTION, SOLUTION INTRAVENOUS CONTINUOUS
Status: DISCONTINUED | OUTPATIENT
Start: 2020-07-09 | End: 2020-07-10 | Stop reason: HOSPADM

## 2020-07-09 RX ORDER — NALOXONE HYDROCHLORIDE 0.4 MG/ML
.1-.4 INJECTION, SOLUTION INTRAMUSCULAR; INTRAVENOUS; SUBCUTANEOUS
Status: DISCONTINUED | OUTPATIENT
Start: 2020-07-09 | End: 2020-07-10 | Stop reason: HOSPADM

## 2020-07-09 RX ORDER — KETOROLAC TROMETHAMINE 30 MG/ML
30 INJECTION, SOLUTION INTRAMUSCULAR; INTRAVENOUS EVERY 6 HOURS
Status: COMPLETED | OUTPATIENT
Start: 2020-07-09 | End: 2020-07-10

## 2020-07-09 RX ORDER — METOCLOPRAMIDE 5 MG/1
5 TABLET ORAL EVERY 6 HOURS PRN
Status: DISCONTINUED | OUTPATIENT
Start: 2020-07-09 | End: 2020-07-10 | Stop reason: HOSPADM

## 2020-07-09 RX ORDER — BUPIVACAINE HYDROCHLORIDE 7.5 MG/ML
INJECTION, SOLUTION INTRASPINAL PRN
Status: DISCONTINUED | OUTPATIENT
Start: 2020-07-09 | End: 2020-07-09

## 2020-07-09 RX ORDER — CEFAZOLIN SODIUM 1 G/50ML
1 INJECTION, SOLUTION INTRAVENOUS EVERY 8 HOURS
Status: COMPLETED | OUTPATIENT
Start: 2020-07-09 | End: 2020-07-10

## 2020-07-09 RX ORDER — ROPIVACAINE HYDROCHLORIDE 5 MG/ML
INJECTION, SOLUTION EPIDURAL; INFILTRATION; PERINEURAL PRN
Status: DISCONTINUED | OUTPATIENT
Start: 2020-07-09 | End: 2020-07-09

## 2020-07-09 RX ORDER — OXYCODONE HYDROCHLORIDE 5 MG/1
5-10 TABLET ORAL
Qty: 56 TABLET | Refills: 0 | Status: SHIPPED | OUTPATIENT
Start: 2020-07-09 | End: 2021-10-18

## 2020-07-09 RX ORDER — ACETAMINOPHEN 325 MG/1
975 TABLET ORAL ONCE
Status: COMPLETED | OUTPATIENT
Start: 2020-07-09 | End: 2020-07-09

## 2020-07-09 RX ORDER — CALCIUM CARBONATE 500 MG/1
1000 TABLET, CHEWABLE ORAL 4 TIMES DAILY PRN
Status: DISCONTINUED | OUTPATIENT
Start: 2020-07-09 | End: 2020-07-10 | Stop reason: HOSPADM

## 2020-07-09 RX ORDER — LIDOCAINE 40 MG/G
CREAM TOPICAL
Status: DISCONTINUED | OUTPATIENT
Start: 2020-07-09 | End: 2020-07-10 | Stop reason: HOSPADM

## 2020-07-09 RX ORDER — MAGNESIUM SULFATE HEPTAHYDRATE 40 MG/ML
2 INJECTION, SOLUTION INTRAVENOUS ONCE
Status: COMPLETED | OUTPATIENT
Start: 2020-07-09 | End: 2020-07-09

## 2020-07-09 RX ORDER — CEFAZOLIN SODIUM 1 G/50ML
1 INJECTION, SOLUTION INTRAVENOUS SEE ADMIN INSTRUCTIONS
Status: DISCONTINUED | OUTPATIENT
Start: 2020-07-09 | End: 2020-07-09 | Stop reason: HOSPADM

## 2020-07-09 RX ORDER — ONDANSETRON 4 MG/1
4 TABLET, ORALLY DISINTEGRATING ORAL EVERY 6 HOURS PRN
Status: DISCONTINUED | OUTPATIENT
Start: 2020-07-09 | End: 2020-07-10 | Stop reason: HOSPADM

## 2020-07-09 RX ORDER — ACETAMINOPHEN 325 MG/1
975 TABLET ORAL EVERY 8 HOURS
Status: DISCONTINUED | OUTPATIENT
Start: 2020-07-09 | End: 2020-07-10 | Stop reason: HOSPADM

## 2020-07-09 RX ORDER — DEXAMETHASONE SODIUM PHOSPHATE 4 MG/ML
INJECTION, SOLUTION INTRA-ARTICULAR; INTRALESIONAL; INTRAMUSCULAR; INTRAVENOUS; SOFT TISSUE PRN
Status: DISCONTINUED | OUTPATIENT
Start: 2020-07-09 | End: 2020-07-09

## 2020-07-09 RX ORDER — FENTANYL CITRATE 50 UG/ML
25-50 INJECTION, SOLUTION INTRAMUSCULAR; INTRAVENOUS
Status: DISCONTINUED | OUTPATIENT
Start: 2020-07-09 | End: 2020-07-09 | Stop reason: HOSPADM

## 2020-07-09 RX ORDER — ACETAMINOPHEN 325 MG/1
650 TABLET ORAL EVERY 4 HOURS PRN
Qty: 100 TABLET | Refills: 0
Start: 2020-07-09 | End: 2021-10-18

## 2020-07-09 RX ORDER — FENTANYL CITRATE 50 UG/ML
INJECTION, SOLUTION INTRAMUSCULAR; INTRAVENOUS PRN
Status: DISCONTINUED | OUTPATIENT
Start: 2020-07-09 | End: 2020-07-09

## 2020-07-09 RX ORDER — KETAMINE HYDROCHLORIDE 10 MG/ML
INJECTION, SOLUTION INTRAMUSCULAR; INTRAVENOUS PRN
Status: DISCONTINUED | OUTPATIENT
Start: 2020-07-09 | End: 2020-07-09

## 2020-07-09 RX ORDER — ONDANSETRON 2 MG/ML
4 INJECTION INTRAMUSCULAR; INTRAVENOUS EVERY 30 MIN PRN
Status: DISCONTINUED | OUTPATIENT
Start: 2020-07-09 | End: 2020-07-09 | Stop reason: HOSPADM

## 2020-07-09 RX ORDER — HYDROXYZINE HYDROCHLORIDE 25 MG/1
25-50 TABLET, FILM COATED ORAL EVERY 6 HOURS PRN
Status: DISCONTINUED | OUTPATIENT
Start: 2020-07-09 | End: 2020-07-10 | Stop reason: HOSPADM

## 2020-07-09 RX ORDER — METOCLOPRAMIDE HYDROCHLORIDE 5 MG/ML
5 INJECTION INTRAMUSCULAR; INTRAVENOUS EVERY 6 HOURS PRN
Status: DISCONTINUED | OUTPATIENT
Start: 2020-07-09 | End: 2020-07-10 | Stop reason: HOSPADM

## 2020-07-09 RX ADMIN — MIDAZOLAM HYDROCHLORIDE 1 MG: 1 INJECTION, SOLUTION INTRAMUSCULAR; INTRAVENOUS at 15:44

## 2020-07-09 RX ADMIN — ROPIVACAINE HYDROCHLORIDE 20 ML: 5 INJECTION, SOLUTION EPIDURAL; INFILTRATION; PERINEURAL at 16:17

## 2020-07-09 RX ADMIN — PROPOFOL 25 MCG/KG/MIN: 10 INJECTION, EMULSION INTRAVENOUS at 14:20

## 2020-07-09 RX ADMIN — ONDANSETRON 4 MG: 2 INJECTION INTRAMUSCULAR; INTRAVENOUS at 15:27

## 2020-07-09 RX ADMIN — ACETAMINOPHEN 975 MG: 325 TABLET, FILM COATED ORAL at 13:01

## 2020-07-09 RX ADMIN — BUPIVACAINE HYDROCHLORIDE IN DEXTROSE 1.6 ML: 7.5 INJECTION, SOLUTION SUBARACHNOID at 14:15

## 2020-07-09 RX ADMIN — MIDAZOLAM HYDROCHLORIDE 1 MG: 1 INJECTION, SOLUTION INTRAMUSCULAR; INTRAVENOUS at 15:27

## 2020-07-09 RX ADMIN — MIDAZOLAM HYDROCHLORIDE 1 MG: 1 INJECTION, SOLUTION INTRAMUSCULAR; INTRAVENOUS at 14:13

## 2020-07-09 RX ADMIN — TRANEXAMIC ACID 1950 MG: 650 TABLET ORAL at 12:30

## 2020-07-09 RX ADMIN — GABAPENTIN 300 MG: 300 CAPSULE ORAL at 13:01

## 2020-07-09 RX ADMIN — FENTANYL CITRATE: 50 INJECTION INTRAMUSCULAR; INTRAVENOUS at 16:51

## 2020-07-09 RX ADMIN — DEXAMETHASONE SODIUM PHOSPHATE 4 MG: 4 INJECTION, SOLUTION INTRAMUSCULAR; INTRAVENOUS at 18:31

## 2020-07-09 RX ADMIN — KETAMINE HYDROCHLORIDE 30 MG: 10 INJECTION INTRAMUSCULAR; INTRAVENOUS at 14:20

## 2020-07-09 RX ADMIN — FENTANYL CITRATE 50 MCG: 50 INJECTION, SOLUTION INTRAMUSCULAR; INTRAVENOUS at 15:17

## 2020-07-09 RX ADMIN — KETAMINE HYDROCHLORIDE 10 MG: 10 INJECTION INTRAMUSCULAR; INTRAVENOUS at 15:25

## 2020-07-09 RX ADMIN — SODIUM CHLORIDE, POTASSIUM CHLORIDE, SODIUM LACTATE AND CALCIUM CHLORIDE: 600; 310; 30; 20 INJECTION, SOLUTION INTRAVENOUS at 12:39

## 2020-07-09 RX ADMIN — MAGNESIUM SULFATE IN WATER 2 G: 40 INJECTION, SOLUTION INTRAVENOUS at 13:01

## 2020-07-09 RX ADMIN — OXYCODONE HYDROCHLORIDE 5 MG: 5 TABLET ORAL at 18:32

## 2020-07-09 RX ADMIN — PROCHLORPERAZINE EDISYLATE 5 MG: 5 INJECTION INTRAMUSCULAR; INTRAVENOUS at 18:29

## 2020-07-09 RX ADMIN — SODIUM CHLORIDE, POTASSIUM CHLORIDE, SODIUM LACTATE AND CALCIUM CHLORIDE: 600; 310; 30; 20 INJECTION, SOLUTION INTRAVENOUS at 15:59

## 2020-07-09 RX ADMIN — LIDOCAINE HYDROCHLORIDE 5 ML: 10; .005 INJECTION, SOLUTION EPIDURAL; INFILTRATION; INTRACAUDAL; PERINEURAL at 16:16

## 2020-07-09 RX ADMIN — CEFAZOLIN SODIUM 2 G: 2 INJECTION, SOLUTION INTRAVENOUS at 14:08

## 2020-07-09 RX ADMIN — FENTANYL CITRATE: 50 INJECTION INTRAMUSCULAR; INTRAVENOUS at 16:21

## 2020-07-09 RX ADMIN — LIDOCAINE HYDROCHLORIDE 30 MG: 10 INJECTION, SOLUTION INFILTRATION; PERINEURAL at 14:14

## 2020-07-09 RX ADMIN — MIDAZOLAM HYDROCHLORIDE 2 MG: 1 INJECTION, SOLUTION INTRAMUSCULAR; INTRAVENOUS at 14:09

## 2020-07-09 RX ADMIN — ACETAMINOPHEN 975 MG: 325 TABLET, FILM COATED ORAL at 21:07

## 2020-07-09 RX ADMIN — CEFAZOLIN SODIUM 1 G: 1 INJECTION, SOLUTION INTRAVENOUS at 21:06

## 2020-07-09 RX ADMIN — KETAMINE HYDROCHLORIDE 10 MG: 10 INJECTION INTRAMUSCULAR; INTRAVENOUS at 15:15

## 2020-07-09 RX ADMIN — DEXAMETHASONE SODIUM PHOSPHATE 10 MG: 4 INJECTION, SOLUTION INTRA-ARTICULAR; INTRALESIONAL; INTRAMUSCULAR; INTRAVENOUS; SOFT TISSUE at 14:09

## 2020-07-09 RX ADMIN — KETAMINE HYDROCHLORIDE 10 MG: 10 INJECTION INTRAMUSCULAR; INTRAVENOUS at 15:44

## 2020-07-09 RX ADMIN — KETOROLAC TROMETHAMINE 30 MG: 30 INJECTION, SOLUTION INTRAMUSCULAR at 18:31

## 2020-07-09 RX ADMIN — OXYCODONE HYDROCHLORIDE 10 MG: 5 TABLET ORAL at 21:07

## 2020-07-09 RX ADMIN — SODIUM CHLORIDE, POTASSIUM CHLORIDE, SODIUM LACTATE AND CALCIUM CHLORIDE: 600; 310; 30; 20 INJECTION, SOLUTION INTRAVENOUS at 17:35

## 2020-07-09 RX ADMIN — Medication 12.5 MG: at 18:41

## 2020-07-09 RX ADMIN — FENTANYL CITRATE 50 MCG: 50 INJECTION, SOLUTION INTRAMUSCULAR; INTRAVENOUS at 14:09

## 2020-07-09 ASSESSMENT — ACTIVITIES OF DAILY LIVING (ADL)
TOILETING: 0-->INDEPENDENT
RETIRED_EATING: 0-->INDEPENDENT
DRESS: 0-->INDEPENDENT
AMBULATION: 0-->INDEPENDENT
COGNITION: 0 - NO COGNITION ISSUES REPORTED
BATHING: 0-->INDEPENDENT
FALL_HISTORY_WITHIN_LAST_SIX_MONTHS: NO
SWALLOWING: 0-->SWALLOWS FOODS/LIQUIDS WITHOUT DIFFICULTY
RETIRED_COMMUNICATION: 0-->UNDERSTANDS/COMMUNICATES WITHOUT DIFFICULTY
TRANSFERRING: 0-->INDEPENDENT
WHICH_OF_THE_ABOVE_FUNCTIONAL_RISKS_HAD_A_RECENT_ONSET_OR_CHANGE?: AMBULATION

## 2020-07-09 ASSESSMENT — MIFFLIN-ST. JEOR: SCORE: 1085.14

## 2020-07-09 ASSESSMENT — ENCOUNTER SYMPTOMS: DYSRHYTHMIAS: 1

## 2020-07-09 NOTE — ANESTHESIA PROCEDURE NOTES
Procedure note : intrathecal  Staff -       CRNA: Dimple Beckwith APRN CRNA    Performed By: CRNA        Pre-Procedure    Location: OR    Procedure Times:7/9/2020 2:10 PM and 7/9/2020 2:20 PM  Pre-Anesthestic Checklist: patient identified, IV checked, site marked, risks and benefits discussed, informed consent, monitors and equipment checked, pre-op evaluation, at physician/surgeon's request and post-op pain management    Timeout  Correct Patient: Yes   Correct Procedure: Yes   Correct Site: Yes   Correct Laterality: Yes   Correct Position: Yes   Site Marked: Yes   .   Procedure Documentation  ASA 2  .    Procedure: intrathecal, .   Patient Position:sitting Insertion Site:L3-4  (right paramedian approach)     Patient Prep/Sterile Barriers; mask, sterile gloves, povidone-iodine 7.5% surgical scrub, patient draped.  .  Needle:  Spinal Needle (gauge): 25  Spinal/LP Needle Length (inches): 3.5 # of attempts: 1 and # of redirects:  Introducer used .        Assessment/Narrative  Paresthesias: No.  .  .  clear CSF fluid removed . Time Injected: 14:15while sitting   Sensory Level: T8

## 2020-07-09 NOTE — ANESTHESIA CARE TRANSFER NOTE
Patient: Marcia Kauffman    Procedure(s):  ARTHROPLASTY, KNEE, TOTAL    Diagnosis: Arthritis [M19.90]  Diagnosis Additional Information: No value filed.    Anesthesia Type:   Spinal, Peripheral Nerve Block     Note:  Airway :Face Mask  Patient transferred to:PACU  Handoff Report: Identifed the Patient, Identified the Reponsible Provider, Reviewed the pertinent medical history, Discussed the surgical course, Reviewed Intra-OP anesthesia mangement and issues during anesthesia, Set expectations for post-procedure period and Allowed opportunity for questions and acknowledgement of understanding      Vitals: (Last set prior to Anesthesia Care Transfer)    CRNA VITALS  7/9/2020 1535 - 7/9/2020 1623      7/9/2020             Pulse:  87    SpO2:  98 %    Resp Rate (observed):  14                Electronically Signed By: BRENT Harmon CRNA  July 9, 2020  4:23 PM

## 2020-07-09 NOTE — ANESTHESIA POSTPROCEDURE EVALUATION
Patient: Marcia Kauffman    Procedure(s):  ARTHROPLASTY, KNEE, TOTAL    Diagnosis:Arthritis [M19.90]  Diagnosis Additional Information: No value filed.    Anesthesia Type:  Spinal, Peripheral Nerve Block    Note:  Anesthesia Post Evaluation    Patient location during evaluation: Bedside  Patient participation: Able to fully participate in evaluation  Level of consciousness: awake  Airway patency: patent  Cardiovascular status: acceptable  Respiratory status: acceptable  Hydration status: acceptable     Anesthetic complications: None          Last vitals:  Vitals:    07/09/20 1645 07/09/20 1700 07/09/20 1710   BP: 132/70 128/74    Pulse: 80     Resp: 11 14    Temp: 36.7  C (98  F)     SpO2: 100% 100% 100%         Electronically Signed By: BRENT Harmon CRNA  July 9, 2020  5:12 PM

## 2020-07-09 NOTE — ANESTHESIA PROCEDURE NOTES
Procedure note : Adductor canal  Staff -       CRNA: Dimple Beckwith APRN CRNA    Performed By: CRNA        Pre-Procedure    Location: OR    Procedure Times:7/9/2020 4:09 PM and 7/9/2020 4:21 PM  Pre-Anesthestic Checklist: patient identified, IV checked, site marked, risks and benefits discussed, informed consent, monitors and equipment checked, pre-op evaluation, at physician/surgeon's request and post-op pain management    Timeout  Correct Patient: Yes   Correct Procedure: Yes   Correct Site: Yes   Correct Laterality: Yes   Correct Position: Yes   Site Marked: Yes   .   Procedure Documentation    .    Procedure: Adductor canal, right.   Patient Position:supine   Ultrasound used to identify targeted nerve, plexus, or vascular marker and placed a needle adjacent to it., Ultrasound was used to visualize the spread of the anesthetic in close proximity to the above stated nerve. A permanent image is entered into the patient's record.  Patient Prep/Sterile Barriers; mask, sterile gloves, chlorhexidine gluconate and isopropyl alcohol, patient draped.  .  Needle:  Needle Gauge: 22.  Needle Length (millimeters) 80    Needle Length (Inches) 4   .        Assessment/Narrative  Paresthesias: No.  .  The placement was negative for: blood aspirated and painful injection.  .

## 2020-07-09 NOTE — PROCEDURES
07/09/20    PREOPERATIVE DIAGNOSES: Primary right knee degenerative joint disease.   POSTOPERATIVE DIAGNOSIS: Primary right knee degenerative joint disease.   PROCEDURE: Right total knee arthroplasty.   SURGEON: Dean Padgett MD   ASSISTANT: Peri Durbin PA-C The presence of the PA was necessary for safe progression of the case.   ANESTHESIA: Spinal with MAC.   ESTIMATED BLOOD LOSS: 50 mL.   TOURNIQUET TIME: 50 minutes at 250 mmHg.   COMPLICATIONS: None apparent.   DISPOSITION: Stable to PACU.    IMPLANTS USED: [employer] size 6N CoCr femoral component, size 4 tibial component, size 4 by 9mm polyethylene, and 35 patella.     INDICATIONS: Marcia is a 69 year old female with a history of progressive right knee pain due to end stage arthritis. Unfortunately, she failed conservative management including therapy and injections. After discussing risks, benefits and surgery, she elected to proceed with a right total knee replacement understanding the risks of infection, damage to vessels and nerves, blood clots, stiffness, ongoing pain, need for revision surgery, need for transfusion.     PROCEDURE: The patient was brought to the preoperative holding area where the right knee was marked. Consent was reviewed. She then was transferred to the operating theater. After induction of successful spinal anesthesia, she was placed supine with a bump under the right hip.  A timeout was performed, verifying correct patient, surgery, location. She received preoperative antibiotics as well as transexemic acid. The right lower extremity was then prepped and draped in standard sterile fashion.     We exsanguinated the leg and inflated the tourniquet. We then made a longitudinal incision over the anterior aspect of the knee. Dissection was carried down through skin and subcutaneous tissue. A medial parapatellar arthrotomy was made, exposing end stage lateral compartment arthritis.  Synovial tissue from the suprapatellar  pouch excised. The anterior horn of the medial meniscus was released and a gentle medial release was performed. Then, the remainder of the fat pad was excised. We turned our attention to the patella. Using a free hand technique, this was resected down to 12 mm and sized to a 35mm, then punched and a metal protector plate was placed. We then turned our attention to the femur. Preoperatively, there was a 10 degree flexion contracture.  Therefore, using an intramedullary alignment guide, 11.5 mm of distal femur was resected in 5 degrees of valgus.     We then turned our attention to the tibia.  An extramedullary alignment cut was used to resect 3mm off the low medial side, perpendicular to the mechanical axis.  We then turned our attention back to the distal femur and sized it to a size 6.  The epicondylar axis was established and we placed our 4-in-1 cutting block perpendicular to it, in 1 degrees of external rotation. With this in place, our anterior, posterior and chamfer distal femur cuts were made.  We then used a laminar  to open the joint in order to remove medial and lateral meniscus remnants as well as posterior osteophytes.  The jig was utilized to cut the distal femoral box for the PS component.  A variety of polyethylene were trialed, and we felt a 9mm was best, with range of motion from full extension to 135 of flexion, stability to varus and valgus stress, normal POLO test, and the patella tracked well.  After this, sized our tibial component to a 4.  We then drilled and punched our tibial component, lining it with the medial 1/3 of the tibial tubercle. The knee was thoroughly irrigated using pulse lavage. The final components were then cemented in place. All excess cement was removed and the knee was placed into full extension while the cement was curing. Also, the wound was instilled with dilute Betadine solution. Once the cement had cured, we retrialed our components with a 9mm poly with  findings as above. We then placed the final poly.  The tourniquet was deflated with hemostasis achieved.  The capsular layer was closed with #1 Stratafix, at which point there was 130 degrees of flexion with gravity.  Subcutaneous tissues with 2-0 Vicryl, skin with a 3-0 Monocryl. A sterile dressing was applied and the patient was awoken from anesthesia and transferred to PACU in stable condition.     POSTOPERATIVE PLAN:   1. Weightbearing as tolerated right lower extremity with PT for mobilization.   2. Deep venous thrombosis prophylaxis: Aspirin 325mg daily x 6 weeks.   3. Perioperative antibiotics.   4. Follow up in 2 weeks for wound check.     RAGINI SOLIS MD

## 2020-07-10 ENCOUNTER — APPOINTMENT (OUTPATIENT)
Dept: PHYSICAL THERAPY | Facility: CLINIC | Age: 70
End: 2020-07-10
Attending: ORTHOPAEDIC SURGERY
Payer: COMMERCIAL

## 2020-07-10 VITALS
BODY MASS INDEX: 22.02 KG/M2 | HEIGHT: 64 IN | DIASTOLIC BLOOD PRESSURE: 52 MMHG | HEART RATE: 89 BPM | SYSTOLIC BLOOD PRESSURE: 114 MMHG | RESPIRATION RATE: 16 BRPM | TEMPERATURE: 97.7 F | OXYGEN SATURATION: 99 % | WEIGHT: 129 LBS

## 2020-07-10 LAB
ANION GAP SERPL CALCULATED.3IONS-SCNC: 4 MMOL/L (ref 3–14)
BUN SERPL-MCNC: 21 MG/DL (ref 7–30)
CALCIUM SERPL-MCNC: 8.8 MG/DL (ref 8.5–10.1)
CHLORIDE SERPL-SCNC: 106 MMOL/L (ref 94–109)
CO2 SERPL-SCNC: 29 MMOL/L (ref 20–32)
CREAT SERPL-MCNC: 0.65 MG/DL (ref 0.52–1.04)
GFR SERPL CREATININE-BSD FRML MDRD: 89 ML/MIN/{1.73_M2}
GLUCOSE SERPL-MCNC: 170 MG/DL (ref 70–99)
HGB BLD-MCNC: 9.4 G/DL (ref 11.7–15.7)
POTASSIUM SERPL-SCNC: 4.7 MMOL/L (ref 3.4–5.3)
SODIUM SERPL-SCNC: 139 MMOL/L (ref 133–144)

## 2020-07-10 PROCEDURE — 25000132 ZZH RX MED GY IP 250 OP 250 PS 637: Performed by: ORTHOPAEDIC SURGERY

## 2020-07-10 PROCEDURE — 97116 GAIT TRAINING THERAPY: CPT | Mod: GP | Performed by: PHYSICAL THERAPIST

## 2020-07-10 PROCEDURE — 99207 ZZC CDG-CODE CATEGORY CHANGED: CPT | Performed by: PHYSICIAN ASSISTANT

## 2020-07-10 PROCEDURE — 80048 BASIC METABOLIC PNL TOTAL CA: CPT | Performed by: ORTHOPAEDIC SURGERY

## 2020-07-10 PROCEDURE — 97161 PT EVAL LOW COMPLEX 20 MIN: CPT | Mod: GP | Performed by: PHYSICAL THERAPIST

## 2020-07-10 PROCEDURE — 25000132 ZZH RX MED GY IP 250 OP 250 PS 637: Performed by: PHYSICIAN ASSISTANT

## 2020-07-10 PROCEDURE — 36415 COLL VENOUS BLD VENIPUNCTURE: CPT | Performed by: ORTHOPAEDIC SURGERY

## 2020-07-10 PROCEDURE — 99224 ZZC SUBSEQUENT OBSERVATION CARE,LEVEL I: CPT | Performed by: PHYSICIAN ASSISTANT

## 2020-07-10 PROCEDURE — 85018 HEMOGLOBIN: CPT | Performed by: ORTHOPAEDIC SURGERY

## 2020-07-10 PROCEDURE — 25000128 H RX IP 250 OP 636: Performed by: ORTHOPAEDIC SURGERY

## 2020-07-10 PROCEDURE — 97110 THERAPEUTIC EXERCISES: CPT | Mod: GP | Performed by: PHYSICAL THERAPIST

## 2020-07-10 RX ADMIN — CEFAZOLIN SODIUM 1 G: 1 INJECTION, SOLUTION INTRAVENOUS at 05:03

## 2020-07-10 RX ADMIN — KETOROLAC TROMETHAMINE 30 MG: 30 INJECTION, SOLUTION INTRAMUSCULAR at 13:28

## 2020-07-10 RX ADMIN — VALACYCLOVIR HYDROCHLORIDE 500 MG: 500 TABLET, FILM COATED ORAL at 08:14

## 2020-07-10 RX ADMIN — ACETAMINOPHEN 975 MG: 325 TABLET, FILM COATED ORAL at 05:01

## 2020-07-10 RX ADMIN — Medication 12.5 MG: at 08:19

## 2020-07-10 RX ADMIN — KETOROLAC TROMETHAMINE 30 MG: 30 INJECTION, SOLUTION INTRAMUSCULAR at 06:06

## 2020-07-10 RX ADMIN — KETOROLAC TROMETHAMINE 30 MG: 30 INJECTION, SOLUTION INTRAMUSCULAR at 00:13

## 2020-07-10 RX ADMIN — ACETAMINOPHEN 975 MG: 325 TABLET, FILM COATED ORAL at 13:25

## 2020-07-10 NOTE — PLAN OF CARE
Discharge Planner PT   Patient plan for discharge: home with significant other  Current status: supine > SBA, sit <> stand CGA, ambulated 150 feet with FWW and CGA, ascend/descend 3 steps with hand held A on 1 side to simulate home CGA, completed LE exercises  Barriers to return to prior living situation: none  Recommendations for discharge: home with OP PT   Rationale for recommendations: patient moving with CGA, pain well controlled, OP PT due to patient moving so well and having ride to/from therapy        Entered by: Kamilah Ferreira 07/10/2020 10:29 AM       Physical Therapy Discharge Summary    Reason for therapy discharge:    All goals and outcomes met, no further needs identified.    Progress towards therapy goal(s). See goals on [facility] electronic [facility] record for goal details.  Goals met    Therapy recommendation(s):    Continued therapy is recommended.  Rationale/Recommendations:  to improve knee ROM, strength and help patient to return to previous level of function.

## 2020-07-10 NOTE — PROGRESS NOTES
07/10/20 0832   Quick Adds   Type of Visit Initial PT Evaluation   Living Environment   Lives With significant other   Living Arrangements house   Home Accessibility stairs to enter home;stairs within home   Number of Stairs, Main Entrance 2   Stair Railings, Main Entrance none   Number of Stairs, Within Home, Primary 5   Stair Railings, Within Home, Primary railing on right side (ascending)   Transportation Anticipated family or friend will provide   Living Environment Comment bathroom with walk in shower, Has help at home    Self-Care   Equipment Currently Used at Home none   Activity/Exercise/Self-Care Comment used a cane and a walking stick prior to surgery,  has a walker at home, has a shower chair, reacher and a cane    Functional Level Prior   Ambulation 0-->independent   Transferring 0-->independent   Toileting 0-->independent   Bathing 0-->independent   Communication 0-->understands/communicates without difficulty   Swallowing 0-->swallows foods/liquids without difficulty   Cognition 0 - no cognition issues reported   Fall history within last six months no   General Information   Onset of Illness/Injury or Date of Surgery - Date 07/09/20   Referring Physician Dean Padgett   Patient/Family Goals Statement to go home today   Pertinent History of Current Problem (include personal factors and/or comorbidities that impact the POC) R TKA   Precautions/Limitations no known precautions/limitations   General Info Comments WBAT, ambulate with assist   Cognitive Status Examination   Orientation orientation to person, place and time   Level of Consciousness alert   Follows Commands and Answers Questions 100% of the time   Personal Safety and Judgment intact   Integumentary/Edema   Integumentary/Edema Comments ACE bandage to right knee, no pitting edema in ankle   Posture    Posture Not impaired   Range of Motion (ROM)   ROM Comment AROM knee flexion R to 70 degrees, knee ext 5 degrees from 0    Strength   Strength  "Comments L hip flexion 4+/5, knee flex/ext on L 4+/5, ankle DF 4+/5 B, ankle DF 4+/5 B    Bed Mobility   Bed Mobility Comments supine > sit SBA   Transfer Skills   Transfer Comments sit <> stand SBA   Gait   Gait Comments ambulated 15 feet to bathroom with FWW and CGA step through gait pattern wtih decreased knee flexion on R    Balance   Balance Comments able to stand and reach outside of TETE with one hand assist on walker    Sensory Examination   Sensory Perception Comments in tact to light touch B LE    General Therapy Interventions   Planned Therapy Interventions balance training;bed mobility training;gait training;neuromuscular re-education;ROM;strengthening;stretching;transfer training   Clinical Impression   Criteria for Skilled Therapeutic Intervention yes, treatment indicated   PT Diagnosis decreased strength s/p R TKA   Influenced by the following impairments pain, weakness, decreased ROM, decreased strength    Functional limitations due to impairments transfers, bed mobility, gait, stairs   Clinical Presentation Stable/Uncomplicated   Clinical Presentation Rationale clinical decision making and chart review   Clinical Decision Making (Complexity) Low complexity   Therapy Frequency Other (see comments)  (1 time treat)   Predicted Duration of Therapy Intervention (days/wks) 1 day   Anticipated Discharge Disposition Home with Outpatient Therapy   Risk & Benefits of therapy have been explained Yes   Patient, Family & other staff in agreement with plan of care Yes   [facility] [employer] TM \"6 Clicks\"   2016, Trustees of [facility], under license to [employer].  All rights reserved.   6 Clicks Short Forms Basic Mobility Inpatient Short Form   [facility] AM-PAC  \"6 Clicks\" V.2 Basic Mobility Inpatient Short Form   1. Turning from your back to your side while in a flat bed without using bedrails? 4 - None   2. Moving from lying on your back to sitting on the side of a flat bed without using " bedrails? 4 - None   3. Moving to and from a bed to a chair (including a wheelchair)? 4 - None   4. Standing up from a chair using your arms (e.g., wheelchair, or bedside chair)? 4 - None   5. To walk in hospital room? 4 - None   6. Climbing 3-5 steps with a railing? 4 - None   Basic Mobility Raw Score (Score out of 24.Lower scores equate to lower levels of function) 24   Total Evaluation Time   Total Evaluation Time (Minutes) 9       Kamilah Ferreira  PT, DPT       7/10/2020   [address] [address]  [email or web address]  Voicemail: [phone]

## 2020-07-10 NOTE — PROGRESS NOTES
Ortho Rounding Note    POD #1 R TKA    Progressing well, pain well controlled  Working with PT, hoping to go home w home PT  No numb/tingling distally, voiding OK    AFVSS  Pleasant, NAD  Aquacel dressing boggy and saturated at distal half. No drainage beyond borders of dressing  PF/DF 5/5, intact  Sensation intact, cap refill brisk. Toes feel cold    Plan:  Continue cares and rehab  Eliquis once daily for DVT prophylaxis (per cardiologist/hospitalist)  Pain control with oxycodone, ibuprofen, and Tylenol  Home today with home care. Follow up in 10-14 days

## 2020-07-10 NOTE — PROGRESS NOTES
[facility]      OUTPATIENT PHYSICAL THERAPY EVALUATION  PLAN OF TREATMENT FOR OUTPATIENT REHABILITATION  (COMPLETE FOR INITIAL CLAIMS ONLY)  Patient's Last Name, First Name, M.I.  YOB: 1950  Marcia Kauffman                        Provider's Name  [facility] Medical Record No.  [id number]                               Onset Date:  07/09/20   Start of Care Date:      7/10/20   Type:     _X_PT   ___OT   ___SLP Medical Diagnosis:   R TKA                        PT Diagnosis:  decreased strength s/p R TKA   Visits from SOC:  1   _________________________________________________________________________________  Plan of Treatment/Functional Goals    Planned Interventions:  ,    balance training, bed mobility training, gait training, neuromuscular re-education, ROM, strengthening, stretching, transfer training,       Goals: See Physical Therapy Goals on Care Plan in [facility] electronic health record.    Therapy Frequency: (P) Other (see comments)(1 time treat)  Predicted Duration of Therapy Intervention: (P) 1 day  _________________________________________________________________________________    I CERTIFY THE NEED FOR THESE SERVICES FURNISHED UNDER        THIS PLAN OF TREATMENT AND WHILE UNDER MY CARE     (Physician co-signature of this document indicates review and certification of the therapy plan).                 ,      Referring Physician: Dean Padgett            Initial Assessment        See Physical Therapy evaluation dated   in Epic electronic health record.

## 2020-07-10 NOTE — PROGRESS NOTES
[facility] Medicine Progress Note  Date of Service: 07/10/2020    Assessment & Plan   Marcia Kauffman is a 69 year old female who presented on 7/9/2020 for scheduled Procedure(s):  ARTHROPLASTY, KNEE, TOTAL by Dean Padgett MD and is being followed by the hospital medicine service for co-management of acute and/or chronic perioperative medical problems.    S/p Procedure(s):  ARTHROPLASTY, KNEE, TOTAL   1 Day Post-Op   Hg 9.4. Pain well managed at present.    - pain control, wound cares, physical therapy, occupational therapy and DVT prophylaxis per orthopedic surgery service    Paroxysmal Atrial Fibrillation s/p ablation  History of ablation in 10/2019, follows with cardiology. Managed on metoprolol and Eliquis.   - continue home metoprolol, taking 12.5 mg twice daily  - restart home Eliquis per ortho, patient only taking once daily due to feeling cold with second dose and concerns on bruising, has previously discussed with her PCP who reportedly encouraged BID dosing, after discussion today patient still hesitant for taking 5 mg BID, she opted to start half tablet at night. Discussed risk for sub-optimal dosing.    Herpes simplex virus (HSV) infection  Previously diagnosed, managed with daily valacylovir.  - continue home valacylovir     DVT Prophylaxis: as per orthopedic surgery service - Eliquis  Code Status: Full Code    Lines: peripheral   Ace catheter: none    Discussion: Medically, the patient appears to be recovering appropriately since surgery. Vital signs stable. No medical barriers to discharge at this time.    Disposition: Anticipate discharge today per ortho pending pain and mobility status    Attestation:  I have reviewed today's vital signs, notes, medications, labs and imaging.  Total time: 15 minutes    This patient was discussed with Dr. Phu Sutton. Plan as above.    Laura Allen PA-C   [facility] Medicine    Interval History   Patient  was seen this morning and is doing well today. Pain manageable. Worked well with PT this morning. good appetite. Slept well overnight. passing Gas. no BM. Voiding regularly.    Denies headache, lightheadedness, dizziness, fever, chills, chest pain, palpitations, shortness of breath, cough, abdominal pain, nausea, vomiting, numbness or tingling in bilateral lower extremities (has some altered sensation at top of right foot which is chronic and unchanged).     Physical Exam   Temp:  [96  F (35.6  C)-98.5  F (36.9  C)] 97.7  F (36.5  C)  Pulse:  [68-89] 89  Heart Rate:  [74-86] 82  Resp:  [10-24] 16  BP: (104-148)/(48-87) 114/52  SpO2:  [93 %-100 %] 99 %    Weights:   Vitals:    07/09/20 1209   Weight: 58.5 kg (129 lb)    Body mass index is 22.14 kg/m .    General: Appears well, sitting up in chair. Alert and oriented. Pleasant and cooperative. No distress. Non-toxic. Appears younger than stated age.   CV: Regular rate, normal rhythm. Radial pulses are 2+ bilaterally. Pedal pulses are 2+. No lower extremity edema.  Respiratory: No accessory muscle usage. Clear to auscultation bilaterally. No wheezes, crackles or rhonchi.   GI: Bowel sounds normoactive in all quadrants. Soft, non-tender, non-distended.  Skin: Warm, dry, intact. Did not assess incision, ACE wrap in place.   Musculoskeletal: Muscle tone is appropriate. Moves all extremities freely with limited range of motion right lower extremity due to pain and bandage.  Neuro: Sensation to light touch of bilateral lower extremities is grossly intact.     Data   Recent Labs   Lab 07/10/20  0431   HGB 9.4*      POTASSIUM 4.7   CHLORIDE 106   CO2 29   BUN 21   CR 0.65   ANIONGAP 4   NAOMI 8.8   *     Recent Labs   Lab 07/10/20  0431   *      Unresulted Labs Ordered in the Past 30 Days of this Admission     No orders found for last 31 day(s).         Imaging  Recent Results (from the past 24 hour(s))   XR Knee Port Right 1/2 Views    Narrative    XR  PORTABLE RIGHT KNEE ONE-TWO VIEWS   7/9/2020 4:28 PM     HISTORY: Postoperative total knee.      Impression    IMPRESSION: Right total knee arthroplasty. Excellent  position/alignment. No complication evident.    ROBYN MANZO MD      I reviewed all new labs and imaging results over the last 24 hours. I personally reviewed no images or EKG's today.    Medications     lactated ringers 75 mL/hr at 07/09/20 1735       acetaminophen  975 mg Oral Q8H     apixaban ANTICOAGULANT  5 mg Oral Daily     ketorolac  30 mg Intravenous Q6H     metoprolol tartrate  12.5 mg Oral Daily     sodium chloride (PF)  3 mL Intracatheter Q8H     valACYclovir  500 mg Oral Daily

## 2020-07-10 NOTE — CONSULTS
Care Transition Initial Assessment - RN      Met with: Patient.    DATA  Principal Problem:    Status post total knee replacement  Active Problems:    Herpes simplex virus (HSV) infection    PAF (paroxysmal atrial fibrillation) (H)    S/P ablation of atrial fibrillation       Cognitive Status: awake, alert and oriented.        Contact information and PCP information verified: No  Lives With: significant other   Living Arrangements: house     Insurance concerns: No Insurance issues identified    This writer met with pt, introduced self and role.  Discussed discharge planning and Medicare guidelines in regards to home care.  Pt was provided with Medicare certified home care list. Pt chooses to use [facility] ([phone] Fax: [phone]).     Pt/family was given the Medicare Compare list for Home Care, with associated star ratings to assist with choice for referrals/discharge planning Yes    Education was given to pt/family that star ratings are updated/maintained by Medicare and can be reviewed by visiting www.medicare.gov Yes     PLAN    Patient to discharge home today with [facility] ([phone] Fax: [phone]).    Alanna Barragan, BRANDEN, RN, CNE, PHN  Inpatient RN Care Coordinator  [facility] - [phone]  [facility] - [phone]    HOME CARE HAND OFF  Patient Name: Marcia Kauffman    MRN: [id number]    : 1950    Patient Zip Code: [address]    Admit Diagnosis: Arthritis [M19.90]      Services Pt Needs at Home: PT    Discharge Support: Family/Friend Support    Living Arrangements: With family     or Address Other Than Pt: No    Wound Care: No    Anticipate DC Date: 7/10/2020

## 2020-07-10 NOTE — PLAN OF CARE
[place] [facility] DISCHARGE NOTE    Patient discharged to home at 4:05 PM via wheel chair. Accompanied by spouse and staff. Discharge instructions reviewed with patient, opportunity offered to ask questions. Prescriptions sent to patients preferred pharmacy. All belongings sent with patient.    Ameena Adan RN

## 2020-07-10 NOTE — DISCHARGE SUMMARY
Ortho Discharge Summary    Admit date: 7.9.20  Discharge date: 7.10.20    Diagnosis: R knee osteoarthritis s/p R total knee arthroplasty (present on admission)    Hospital course:    Pt admitted after undergoing R TKA with rather uneventful recovery. Comanaged by hospitalist. No complications. Started on oxycodone, Tylenol and toradol for pain control. PT/OT consulted for mobility. Pt takes Eliquis for paroxysmal a fib per cardiologist. Takes this once daily    Discharged to home in stable condition. Pt to continue home PT through [facility]  Rehab potential excellent    Sent home with rx:  -oxycodone 5mg  -Ibuprofen 600mg  -acetaminophen 500mg     Follow up with [facility] in 10-14 days.    GENEVA SullivanC  [phone]

## 2020-07-10 NOTE — PLAN OF CARE
Patient alert and orientated. Vital signs stable. On room air. Afebrile.     POD #0-1. Right leg covered with ace wrap that is clean, dry, and intact. CMS intact. Pain controlled with scheduled tylenol and PRN oxycodone. Ice applied intermittently. Patient dangled and walked to the bathroom with assist of 1 and a walker. Patient voided without any issues. Denied any nausea. IV fluids running overnight. Patient able to sleep for portions of the night.

## 2020-08-10 ENCOUNTER — HOSPITAL ENCOUNTER (OUTPATIENT)
Dept: PHYSICAL THERAPY | Facility: CLINIC | Age: 70
Setting detail: THERAPIES SERIES
End: 2020-08-10
Attending: ORTHOPAEDIC SURGERY
Payer: COMMERCIAL

## 2020-08-10 PROCEDURE — 97161 PT EVAL LOW COMPLEX 20 MIN: CPT | Mod: GP | Performed by: PHYSICAL THERAPIST

## 2020-08-10 PROCEDURE — 97110 THERAPEUTIC EXERCISES: CPT | Mod: GP | Performed by: PHYSICAL THERAPIST

## 2020-08-10 NOTE — PROGRESS NOTES
[facility]          OUTPATIENT PHYSICAL THERAPY ORTHOPEDIC EVALUATION  PLAN OF TREATMENT FOR OUTPATIENT REHABILITATION  (COMPLETE FOR INITIAL CLAIMS ONLY)  Patient's Last Name, First Name, M.I.  YOB: 1950  Marcia Kauffman    Provider s Name:  [facility]   Medical Record No.  [id number]   Start of Care Date:  08/10/20   Onset Date:  07/09/20   Type:     _X__PT   ___OT   ___SLP Medical Diagnosis:  s/p R TKA     PT Diagnosis:  s/p R TKA   Visits from SOC:  1      _________________________________________________________________________________  Plan of Treatment/Functional Goals:  manual therapy, ROM, strengthening, stretching, neuromuscular re-education           Goals  Goal Identifier: 1.  STG  Goal Description: Pt will be able to walk in her house w/o assistive device w/ slight to no limp  Target Date: 08/31/20    Goal Identifier: 2.  LTG  Goal Description: Pt will be able to do stairs reciprocally w/ 1 rail and minimal difficulty  Target Date: 09/29/20    Goal Identifier: 3. LTG  Goal Description: Pt will wake no > 4X/wk due to knee pain  Target Date: 09/29/20    Goal Identifier: 4.  LTG  Goal Description: Pt will walk outdoors w/ SEC   Target Date: 09/29/20    Goal Identifier: 5.  LTG  Goal Description: Pt will be independent and consistent w/ HEP  Target Date: 09/29/20       Therapy Frequency:  2 times/Week  Predicted Duration of Therapy Intervention:  2 weeks then 1X/wk X 4 weeks = 8 visits    Casandra Lieberman, PT                 I CERTIFY THE NEED FOR THESE SERVICES FURNISHED UNDER        THIS PLAN OF TREATMENT AND WHILE UNDER MY CARE .             Physician Signature               Date    X_____________________________________________________                             Certification Date From:  08/10/20   Certification Date To:  09/29/20    Referring Provider:  Dr. Dean Padgett    Initial Assessment        See Epic Evaluation Start of Care Date:  08/10/20

## 2020-08-10 NOTE — PROGRESS NOTES
08/10/20 1100   General Information   Type of Visit Initial OP Ortho PT Evaluation   Start of Care Date 08/10/20   Referring Physician Dr. Dean Padgett   Patient/Family Goals Statement To be able to return to hiking/ regular activity w/o pain   Orders Evaluate and Treat   Orders Comment please work on knee ROM, quad strength and gait training   Date of Order 05/19/20   Certification Required? Yes   Medical Diagnosis s/p R TKA   Body Part(s)   Body Part(s) Knee   Presentation and Etiology   Pertinent history of current problem (include personal factors and/or comorbidities that impact the POC) Pt  is s/p R TKA on 7/9/20.  Pt had home care 2X/wk thru last week.  Pain @ best 3/10, @ worst 8/10.   Meds: tylenol/ibuprofen.  hydroxine.  Pt notes she has had mm spasms in calf and medial knee.  Pt cont to note swelling and tightness in the knee.   PMHX:  heart ablation, arthritis, Osteoporosis.  L hip pain.  Mod complexity: comorbidities   Impairments A. Pain;B. Decreased WB tolerance;C. Swelling;D. Decreased ROM;E. Decreased flexibility   Onset date of current episode/exacerbation 07/09/20   Pain quality A. Sharp;B. Dull;C. Aching;E. Shooting;G. Cramping   Pain exacerbation comment marked time w/ stairs,  Uses cane in the house, walker outside.   Waking a couple times /night.   Bending   Pain/symptoms eased by A. Sitting;C. Rest;E. Changing positions;G. Heat;H. Cold;I. OTC medication(s);J. Braces/supports   Progression of symptoms since onset: Improved   Prior Level of Function   Functional Level Prior Comment walks w/o device , long distances w/ SEC.  stairs reciprocal.   Hikes,   Walks (driveway RT 2/3 mile)   Current Level of Function   Patient role/employment history F. Retired   Fall Risk Screen   Fall screen completed by PT   Have you fallen 2 or more times in the past year? No   Have you fallen and had an injury in the past year? No   Is patient a fall risk? No   Abuse Screen (yes response referral indicated)    Feels Unsafe at Home or Work/School no   Feels Threatened by Someone no   Does Anyone Try to Keep You From Having Contact with Others or Doing Things Outside Your Home? no   Knee Objective Findings   Side (if bilateral, select both right and left) Right   Observation well healed incision   Integumentary  Girth suprapatellar  R 39.5 cm,  L 37.5 cm   Gait/Locomotion slight limp w/ SEC and slower pace.     Right Knee Extension PROM lacks 7* from neutral   Right Knee Flexion AROM 114*   Right Knee Flexion PROM 120*   R VMO Strength SLR good + (lag noted)   Planned Therapy Interventions   Planned Therapy Interventions manual therapy;ROM;strengthening;stretching;neuromuscular re-education   Clinical Impression   Criteria for Skilled Therapeutic Interventions Met yes, treatment indicated   PT Diagnosis s/p R TKA   Influenced by the following impairments pain, swelling, decreased motion, decreased strength   Functional limitations due to impairments walking, stairs, sleeping   Clinical Presentation Stable/Uncomplicated   Clinical Presentation Rationale progressing as expected following TKA   Clinical Decision Making (Complexity) Low complexity   Therapy Frequency 2 times/Week   Predicted Duration of Therapy Intervention (days/wks) 2 weeks then 1X/wk X 4 weeks = 8 visits   Risk & Benefits of therapy have been explained Yes   Patient, Family & other staff in agreement with plan of care Yes   Education Assessment   Barriers to Learning No barriers   Ortho Goal 1   Goal Identifier 1.  STG   Goal Description Pt will be able to walk in her house w/o assistive device w/ slight to no limp   Target Date 08/31/20   Ortho Goal 2   Goal Identifier 2.  LTG   Goal Description Pt will be able to do stairs reciprocally w/ 1 rail and minimal difficulty   Target Date 09/29/20   Ortho Goal 3   Goal Identifier 3. LTG   Goal Description Pt will wake no > 4X/wk due to knee pain   Target Date 09/29/20   Ortho Goal 4   Goal Identifier 4.  LTG    Goal Description Pt will walk outdoors w/ SEC    Target Date 09/29/20   Ortho Goal 5   Goal Identifier 5.  LTG   Goal Description Pt will be independent and consistent w/ HEP   Target Date 09/29/20   Total Evaluation Time   PT Eval, Low Complexity Minutes (69221) 15   Therapy Certification   Certification date from 08/10/20   Certification date to 09/29/20   Medical Diagnosis s/p R TKA     Thank you for this referral,    Casandra Lieberman, PT,  CEAS   #[id number]  [facility].  [phone]

## 2020-08-14 ENCOUNTER — HOSPITAL ENCOUNTER (OUTPATIENT)
Dept: PHYSICAL THERAPY | Facility: CLINIC | Age: 70
Setting detail: THERAPIES SERIES
End: 2020-08-14
Attending: ORTHOPAEDIC SURGERY
Payer: COMMERCIAL

## 2020-08-14 PROCEDURE — 97110 THERAPEUTIC EXERCISES: CPT | Mod: GP | Performed by: PHYSICAL THERAPIST

## 2020-08-18 ENCOUNTER — HOSPITAL ENCOUNTER (OUTPATIENT)
Dept: PHYSICAL THERAPY | Facility: CLINIC | Age: 70
Setting detail: THERAPIES SERIES
End: 2020-08-18
Attending: ORTHOPAEDIC SURGERY
Payer: COMMERCIAL

## 2020-08-18 PROCEDURE — 97110 THERAPEUTIC EXERCISES: CPT | Mod: GP | Performed by: PHYSICAL THERAPIST

## 2020-08-28 ENCOUNTER — HOSPITAL ENCOUNTER (OUTPATIENT)
Dept: PHYSICAL THERAPY | Facility: CLINIC | Age: 70
Setting detail: THERAPIES SERIES
End: 2020-08-28
Attending: ORTHOPAEDIC SURGERY
Payer: COMMERCIAL

## 2020-08-28 PROCEDURE — 97110 THERAPEUTIC EXERCISES: CPT | Mod: GP | Performed by: PHYSICAL THERAPIST

## 2020-09-08 ENCOUNTER — HOSPITAL ENCOUNTER (OUTPATIENT)
Dept: PHYSICAL THERAPY | Facility: CLINIC | Age: 70
Setting detail: THERAPIES SERIES
End: 2020-09-08
Attending: ORTHOPAEDIC SURGERY
Payer: COMMERCIAL

## 2020-09-08 PROCEDURE — 97110 THERAPEUTIC EXERCISES: CPT | Mod: GP | Performed by: PHYSICAL THERAPIST

## 2020-10-08 ENCOUNTER — TELEPHONE (OUTPATIENT)
Dept: OBGYN | Facility: CLINIC | Age: 70
End: 2020-10-08

## 2020-10-08 NOTE — TELEPHONE ENCOUNTER
LOV 10/209. Just had knee replacement four months ago and slow to heal per pt  She said her last had reclast and she tolerated it well , however the night following her infusion she had complete body pain.  She would like to continue with the reclast. Wondering if needs appt first?  Dexa?  Routing to Dr. Rogers.  Nancy Siddiqui RN

## 2020-10-08 NOTE — TELEPHONE ENCOUNTER
----- Message from Hilda Ruiz sent at 10/8/2020  3:52 PM CDT -----  Regarding: Appointment  [facility] Call Center    Phone Message    May a detailed message be left on voicemail: yes     Reason for Call: Other: Marcia calling to request a call back from the care team. Marcia would like to know if she needs to schedule an appointment with Dr. Rogers to get another round of reclast. Marcia says it has been about a year since she last saw Dr. Rogers. Please give Marcia a call back at your earliest convenience to discuss.    Thank you,  Hilda Ruiz    Please do not send message and/or reply back to sender. Call Center Representatives do not not respond to messages.

## 2020-10-09 NOTE — TELEPHONE ENCOUNTER
Alexia Rogers MD PhD  Chen, Nancy A, RN   Caller: Unspecified (Yesterday,  4:08 PM)             She needs a video visit before I can order the IV reclast.  Can you get her in during the next few weeks?   Alexia SEAMAN on Marcia's voice mail of above information, ask that she please call back to [phone]   To assist with scheduling.  Nancy Siddiqui RN

## 2020-10-19 ENCOUNTER — VIRTUAL VISIT (OUTPATIENT)
Dept: FAMILY MEDICINE | Facility: CLINIC | Age: 70
End: 2020-10-19
Attending: FAMILY MEDICINE
Payer: COMMERCIAL

## 2020-10-19 DIAGNOSIS — M81.0 SENILE OSTEOPOROSIS: Primary | ICD-10-CM

## 2020-10-19 PROCEDURE — 99214 OFFICE O/P EST MOD 30 MIN: CPT | Mod: 95 | Performed by: FAMILY MEDICINE

## 2020-10-19 NOTE — PATIENT INSTRUCTIONS
Dr Bills will call with infusion center info.  Get IV reclast Nov 2020.   Take tylenol the day of and for 2-3 days post infusion  You will need a COVID test prior to the infusion  Get a DEXA at [facility] 9/2021  Then see me post DEXA    Patient should take 1200mg of calcium/day - diet and all supplements combined -  in divided doses and vitamin D3 1000IU/day.

## 2020-10-19 NOTE — LETTER
"10/19/2020       RE: Marcia Kauffman  [address] [place] [address]     Dear Colleague,    Thank you for referring your patient, Marcia Kauffman, to the [facility]. Please see a copy of my visit note below.      Marcia Kauffman is a 69 year old female who is being evaluated via a billable video visit.      The patient has been notified of following:     \"This video visit will be conducted via a call between you and your physician/provider. We have found that certain health care needs can be provided without the need for an in-person physical exam.  This service lets us provide the care you need with a video conversation.  If a prescription is necessary we can send it directly to your pharmacy.  If lab work is needed we can place an order for that and you can then stop by our lab to have the test done at a later time.    Video visits are billed at different rates depending on your insurance coverage.  Please reach out to your insurance provider with any questions.    If during the course of the call the physician/provider feels a video visit is not appropriate, you will not be charged for this service.\"    Patient has given verbal consent for Video visit? Yes  How would you like to obtain your AVS? MyChart  If you are dropped from the video visit, the video invite should be resent to: Text to cell phone: [phone]  Will anyone else be joining your video visit? No    Subjective     Marcia Kauffman is a 69 year old female who presents today via video visit for the following health issues: osteoporosis    Video Start Time: 10:18AM      Video-Visit Details    Type of service:  Video Visit    Video End Time:10:44AM    Originating Location (pt. Location): Home    Distant Location (provider location):  [facility]     Platform used for Video Visit: Kalyani Kennedy is a "  69 year old female post menopausal] [GR0, P0] that presents today for osteoporosis follow up patient was last seen [id number]. Spine surgery was 5/2018. Took Forteo for 2 yrs. Received IV reclast 11/2019. Did have some achy symptoms.    Referring Physician: Nanette Beckford MD     HPI     Have you ever had a bone density test? Yes  Where = in [place]  When = 9/2019, 6/2017 [facility] , 2015 in [place]   Spine Tscore = unable - has hardware  Left neck Tscore = -3.3    Total left hip Tscore = -3.2 Loss 25%   Right neck Tscore = -3.2  Total Right hip Tscore = -3.3   Radius 33% -2.7   Have you received any x-ray dye or contrast in the last ten days? No  How many servings of dairy products do you consume per day? 2-3 Type: milk, yogurt, cheese   Do you take a multi-vitamin daily? No  Do you take a vitamin D supplement? Yes 1000IU/day  Do you take a calcium supplement daily?  Dicalcium phosphate 600mg/pill - taking 2/day   - 600IU/pill  Do you take a supplement containing strontium? No  Are you exposed to natural sunlight at least 20 minutes three times a week? Yes    Social History   reports that she has never smoked. She has never used smokeless tobacco. She reports current alcohol use. She reports that she does not use drugs.  Do you smoke cigarettes? No  Do you exercise? Yes. Details: walks - almost  q day  - had recent knee replacement   Do you drink alcohol? No    Medication History  Have you used any of the following medications?   Actonel (Risedronate): No   Aredia (Pamidronate): No   Boniva (Ibindronate): No   Didronil (Etidronate): No   Evista (Raloxifene): No   Fosamax (Alendronate): No   Forteo (Parathyroid hormone) injections: 7/2017-7/2019   HCTZ (Thiazide): No   Calcitonin nasal spray: No   Reclast or Zometa (Zolendronate): IV reclast 11/2019   Prolia (Denosumab): No    Current Outpatient Medications   Medication Sig Dispense Refill     Acetaminophen (TYLENOL PO)        acetaminophen (TYLENOL) 325  MG tablet Take 2 tablets (650 mg) by mouth every 4 hours as needed for other (mild pain) 100 tablet 0     apixaban ANTICOAGULANT (ELIQUIS ANTICOAGULANT) 5 MG tablet Take 1 tablet (5 mg) by mouth daily , and start 0.5 tablet every evening 30 tablet 0     CALCIUM-VITAMIN D PO        cetirizine (ZYRTEC) 10 MG tablet Take 10 mg by mouth daily       Coenzyme Q10 (CO Q 10 PO) Take 1 chew tab by mouth every morning        DAILY MULTI VITAMIN/MINERALS OR 1 TABLET DAILY AT BREAKFAST ON HOLD FOR SURGERY SINCE 05/01/2018       GLUCOSAMINE CHONDR 500 COMPLEX OR CAPS 1 capsule daily at noon       hydrOXYzine (ATARAX) 25 MG tablet Take 1-2 tablets (25-50 mg) by mouth every 6 hours as needed for itching or anxiety (Take with pain meds.  Helps with nausea, muscle spasms) 50 tablet 0     ibuprofen (ADVIL/MOTRIN) 600 MG tablet Take 1 tablet (600 mg) by mouth every 8 hours as needed for pain (mild) 30 tablet 0     metoprolol tartrate (LOPRESSOR) 25 MG tablet Take 12.5 mg by mouth 2 times daily        Multiple Vitamins-Minerals (VISION FORMULA/LUTEIN) TABS        oxyCODONE (ROXICODONE) 5 MG tablet Take 1-2 tablets (5-10 mg) by mouth every 3 hours as needed for pain (Moderate to Severe) 56 tablet 0     Probiotic Product (PROBIOTIC PO) Take by mouth every other day       senna-docusate (SENOKOT-S/PERICOLACE) 8.6-50 MG tablet Take 1-2 tablets by mouth daily as needed for constipation Take while on oral narcotics to prevent or treat constipation. 10 tablet 0     UNABLE TO FIND Apply 0.25 tsp. topically every other day MEDICATION NAME: Bio- estrol, plant based hormone replacement therapy        UNCLASSIFIED OTC PRODUCT MISC RelaxaMag  1 capsule at bedtime as needed -  a unique magnesium supplement that helps improve sleep and stress tolerance in the evening       valACYclovir (VALTREX) 500 MG tablet Take 1 tablet (500 mg) by mouth 2 times daily For 3 days when flares occur (Patient taking differently: Take 500 mg by mouth daily ) 30 tablet  3          Allergies   Allergen Reactions     Dust Mite Extract      Escitalopram Unknown     Nausea and headache     Hydrocodone Nausea and Vomiting     n/v     Meperidine Nausea and Vomiting     vomiting  vomiting  vomiting     Mold      Trees        Past Medical History    Family History   Problem Relation Age of Onset     Breast Cancer Mother         cancer -free for 17 years, XRT     Arthritis Mother      Eye Disorder Mother         macular degeneration     Heart Disease Mother         murmur     Cerebrovascular Disease Mother      Diabetes Father              Arthritis Father      Arthritis Sister      Blood Disease Paternal Grandfather      Family History Negative No family hx of        ROS:  General: none  Head/Eyes: none  Ears/Nose/Throat: stuffiness/congestion  Cardiovascular: none  Respiratory: allergies  Gastrointestinal: none  Breast: none  Genitourinary: none  Sexual Function: none  Musculoskeletal: joint pain  Skin: none  Neurological: none  Mental Health: none  Endocrine: none    Clinic Measurements  Vitals: There were no vitals taken for this visit.  BMI= There is no height or weight on file to calculate BMI.    Physical exam  GENERAL: Healthy, alert and no distress  EYES: Eyes grossly normal to inspection.  No discharge or erythema, or obvious scleral/conjunctival abnormalities.  RESP: No audible wheeze, cough, or visible cyanosis.  No visible retractions or increased work of breathing.    SKIN: Visible skin clear. No significant rash, abnormal pigmentation or lesions.  NEURO: Cranial nerves grossly intact.  Mentation and speech appropriate for age.  PSYCH: Mentation appears normal, affect normal/bright, judgement and insight intact, normal speech and appearance well-groomed.          LAB  Vertebra; Fracture Assessment: NA  Dexa Scan: 2019    FRAX Assessment Tool: [N/A, on IV reclast  Risk Factors: Age, PM, T score of radius     ASSESSMENT:  This is a 69-year-old postmenopausal female  with osteoporosis by T-scores.  She had been on Forteo for 2 years, July 2017 to July 2019.  She had her back surgery which was successful in May 2018.  She then took a break and had IV reclast in November 2019.  She had some mild achiness with this.  She is  still at moderate risk for fracture.  Recommending another 2 years of IV Reclast.  We will set it up for November 2020.  She will need a creatinine prior to this and also a Covid test.  She is aware of this.  We discussed calcium, I encouraged her to limit her total calcium to 1200 to 1500 mg a day.  I would let Dr. Bills know when she can set help set this up.    PLAN:  Dr Bills will call with infusion center info.  Get IV reclast Nov 2020.   Take tylenol the day of and for 2-3 days post infusion  You will need a COVID test prior to the infusion  Get a DEXA at [facility] 9/2021 same place as 9/2019  Then see me post DEXA    Patient should take 1200mg of calcium/day - diet and all supplements combined -  in divided doses and vitamin D3 1000IU/day.        I spent a total of 26 minutes with the patient during today's video visit.  This time was spent counseling the patient and/or coordinating care regarding medication, calcium and vit D.  See note for details.    Thank you for allowing me to participate in the care of your patient.  Please do not hesitate to call with questions or concerns.    Sincerely,    Alexia Rogers MD, PhD    Nanette Beckford MD

## 2020-10-19 NOTE — PROGRESS NOTES
"  Marcia Kauffman is a 69 year old female who is being evaluated via a billable video visit.      The patient has been notified of following:     \"This video visit will be conducted via a call between you and your physician/provider. We have found that certain health care needs can be provided without the need for an in-person physical exam.  This service lets us provide the care you need with a video conversation.  If a prescription is necessary we can send it directly to your pharmacy.  If lab work is needed we can place an order for that and you can then stop by our lab to have the test done at a later time.    Video visits are billed at different rates depending on your insurance coverage.  Please reach out to your insurance provider with any questions.    If during the course of the call the physician/provider feels a video visit is not appropriate, you will not be charged for this service.\"    Patient has given verbal consent for Video visit? Yes  How would you like to obtain your AVS? MyChart  If you are dropped from the video visit, the video invite should be resent to: Text to cell phone: [phone]  Will anyone else be joining your video visit? No    Subjective     Marcia Kauffman is a 69 year old female who presents today via video visit for the following health issues: osteoporosis    Video Start Time: 10:18AM      Video-Visit Details    Type of service:  Video Visit    Video End Time:10:44AM    Originating Location (pt. Location): Home    Distant Location (provider location):  [facility]     Platform used for Video Visit: Kalyani Kennedy is a  69 year old female post menopausal] [GR0, P0] that presents today for osteoporosis follow up patient was last seen [id number]. Spine surgery was 5/2018. Took Forteo for 2 yrs. Received IV reclast 11/2019. Did have some achy symptoms.    Referring Physician: Nanette Beckford MD     HPI     Have you ever had a bone " density test? Yes  Where = in [place]  When = 9/2019, 6/2017 [facility] , 2015 in [place]   Spine Tscore = unable - has hardware  Left neck Tscore = -3.3    Total left hip Tscore = -3.2 Loss 25%   Right neck Tscore = -3.2  Total Right hip Tscore = -3.3   Radius 33% -2.7   Have you received any x-ray dye or contrast in the last ten days? No  How many servings of dairy products do you consume per day? 2-3 Type: milk, yogurt, cheese   Do you take a multi-vitamin daily? No  Do you take a vitamin D supplement? Yes 1000IU/day  Do you take a calcium supplement daily?  Dicalcium phosphate 600mg/pill - taking 2/day   - 600IU/pill  Do you take a supplement containing strontium? No  Are you exposed to natural sunlight at least 20 minutes three times a week? Yes    Social History   reports that she has never smoked. She has never used smokeless tobacco. She reports current alcohol use. She reports that she does not use drugs.  Do you smoke cigarettes? No  Do you exercise? Yes. Details: walks - almost  q day  - had recent knee replacement   Do you drink alcohol? No    Medication History  Have you used any of the following medications?   Actonel (Risedronate): No   Aredia (Pamidronate): No   Boniva (Ibindronate): No   Didronil (Etidronate): No   Evista (Raloxifene): No   Fosamax (Alendronate): No   Forteo (Parathyroid hormone) injections: 7/2017-7/2019   HCTZ (Thiazide): No   Calcitonin nasal spray: No   Reclast or Zometa (Zolendronate): IV reclast 11/2019   Prolia (Denosumab): No    Current Outpatient Medications   Medication Sig Dispense Refill     Acetaminophen (TYLENOL PO)        acetaminophen (TYLENOL) 325 MG tablet Take 2 tablets (650 mg) by mouth every 4 hours as needed for other (mild pain) 100 tablet 0     apixaban ANTICOAGULANT (ELIQUIS ANTICOAGULANT) 5 MG tablet Take 1 tablet (5 mg) by mouth daily , and start 0.5 tablet every evening 30 tablet 0     CALCIUM-VITAMIN D PO        cetirizine (ZYRTEC) 10 MG tablet  Take 10 mg by mouth daily       Coenzyme Q10 (CO Q 10 PO) Take 1 chew tab by mouth every morning        DAILY MULTI VITAMIN/MINERALS OR 1 TABLET DAILY AT BREAKFAST ON HOLD FOR SURGERY SINCE 05/01/2018       GLUCOSAMINE CHONDR 500 COMPLEX OR CAPS 1 capsule daily at noon       hydrOXYzine (ATARAX) 25 MG tablet Take 1-2 tablets (25-50 mg) by mouth every 6 hours as needed for itching or anxiety (Take with pain meds.  Helps with nausea, muscle spasms) 50 tablet 0     ibuprofen (ADVIL/MOTRIN) 600 MG tablet Take 1 tablet (600 mg) by mouth every 8 hours as needed for pain (mild) 30 tablet 0     metoprolol tartrate (LOPRESSOR) 25 MG tablet Take 12.5 mg by mouth 2 times daily        Multiple Vitamins-Minerals (VISION FORMULA/LUTEIN) TABS        oxyCODONE (ROXICODONE) 5 MG tablet Take 1-2 tablets (5-10 mg) by mouth every 3 hours as needed for pain (Moderate to Severe) 56 tablet 0     Probiotic Product (PROBIOTIC PO) Take by mouth every other day       senna-docusate (SENOKOT-S/PERICOLACE) 8.6-50 MG tablet Take 1-2 tablets by mouth daily as needed for constipation Take while on oral narcotics to prevent or treat constipation. 10 tablet 0     UNABLE TO FIND Apply 0.25 tsp. topically every other day MEDICATION NAME: Bio- estrol, plant based hormone replacement therapy        UNCLASSIFIED OTC PRODUCT MISC RelaxaMag  1 capsule at bedtime as needed -  a unique magnesium supplement that helps improve sleep and stress tolerance in the evening       valACYclovir (VALTREX) 500 MG tablet Take 1 tablet (500 mg) by mouth 2 times daily For 3 days when flares occur (Patient taking differently: Take 500 mg by mouth daily ) 30 tablet 3          Allergies   Allergen Reactions     Dust Mite Extract      Escitalopram Unknown     Nausea and headache     Hydrocodone Nausea and Vomiting     n/v     Meperidine Nausea and Vomiting     vomiting  vomiting  vomiting     Mold      Trees        Past Medical History    Family History   Problem Relation Age  of Onset     Breast Cancer Mother         cancer -free for 17 years, XRT     Arthritis Mother      Eye Disorder Mother         macular degeneration     Heart Disease Mother         murmur     Cerebrovascular Disease Mother      Diabetes Father              Arthritis Father      Arthritis Sister      Blood Disease Paternal Grandfather      Family History Negative No family hx of        ROS:  General: none  Head/Eyes: none  Ears/Nose/Throat: stuffiness/congestion  Cardiovascular: none  Respiratory: allergies  Gastrointestinal: none  Breast: none  Genitourinary: none  Sexual Function: none  Musculoskeletal: joint pain  Skin: none  Neurological: none  Mental Health: none  Endocrine: none    Clinic Measurements  Vitals: There were no vitals taken for this visit.  BMI= There is no height or weight on file to calculate BMI.    Physical exam  GENERAL: Healthy, alert and no distress  EYES: Eyes grossly normal to inspection.  No discharge or erythema, or obvious scleral/conjunctival abnormalities.  RESP: No audible wheeze, cough, or visible cyanosis.  No visible retractions or increased work of breathing.    SKIN: Visible skin clear. No significant rash, abnormal pigmentation or lesions.  NEURO: Cranial nerves grossly intact.  Mentation and speech appropriate for age.  PSYCH: Mentation appears normal, affect normal/bright, judgement and insight intact, normal speech and appearance well-groomed.          LAB  Vertebra; Fracture Assessment: NA  Dexa Scan: 2019    FRAX Assessment Tool: [N/A, on IV reclast  Risk Factors: Age, PM, T score of radius     ASSESSMENT:  This is a 69-year-old postmenopausal female with osteoporosis by T-scores.  She had been on Forteo for 2 years, 2017 to 2019.  She had her back surgery which was successful in May 2018.  She then took a break and had IV reclast in 2019.  She had some mild achiness with this.  She is  still at moderate risk for fracture.  Recommending another  2 years of IV Reclast.  We will set it up for November 2020.  She will need a creatinine prior to this and also a Covid test.  She is aware of this.  We discussed calcium, I encouraged her to limit her total calcium to 1200 to 1500 mg a day.  I would let Dr. Bills know when she can set help set this up.    PLAN:  Dr Bills will call with infusion center info.  Get IV reclast Nov 2020.   Take tylenol the day of and for 2-3 days post infusion  You will need a COVID test prior to the infusion  Get a DEXA at [facility] 9/2021 same place as 9/2019  Then see me post DEXA    Patient should take 1200mg of calcium/day - diet and all supplements combined -  in divided doses and vitamin D3 1000IU/day.        I spent a total of 26 minutes with the patient during today's video visit.  This time was spent counseling the patient and/or coordinating care regarding medication, calcium and vit D.  See note for details.    Thank you for allowing me to participate in the care of your patient.  Please do not hesitate to call with questions or concerns.    Sincerely,    Alexia Rogers MD, PhD    Nanette Beckford MD

## 2020-10-20 ENCOUNTER — TELEPHONE (OUTPATIENT)
Dept: PHARMACY | Facility: CLINIC | Age: 70
End: 2020-10-20

## 2020-10-20 DIAGNOSIS — Z20.822 COVID-19 RULED OUT: Primary | ICD-10-CM

## 2020-10-20 RX ORDER — HEPARIN SODIUM (PORCINE) LOCK FLUSH IV SOLN 100 UNIT/ML 100 UNIT/ML
5 SOLUTION INTRAVENOUS
Status: CANCELLED | OUTPATIENT
Start: 2020-11-01

## 2020-10-20 RX ORDER — ZOLEDRONIC ACID 5 MG/100ML
5 INJECTION, SOLUTION INTRAVENOUS ONCE
Status: CANCELLED
Start: 2020-11-01 | End: 2020-11-01

## 2020-10-20 RX ORDER — HEPARIN SODIUM,PORCINE 10 UNIT/ML
5 VIAL (ML) INTRAVENOUS
Status: CANCELLED | OUTPATIENT
Start: 2020-11-01

## 2020-10-20 NOTE — TELEPHONE ENCOUNTER
Called patient to discuss Reclast infusion in November (last 11/19/20).  With last infusion had 1 night of achy pain.  She took APAP for that pain at that time, and it was OK;  Educated her she may tolerate her 2nd infusion better this year.      Reclast orders are now in.  Patient given number of [phone] to schedule this infusion at the [facility] on [address].     Educated that she'll need a covid test prior -- will route to Dr. Rogers to enter that order.  Patient understands and all questions answered.    Osiris Bills, Pharm.D., BCPS

## 2020-10-21 NOTE — PROGRESS NOTES
"   OUTPATIENT PHYSICAL THERAPY DISCHARGE SUMMARY    Dr. Dean Padgett      8/10/20 to 09/08/20 1400   Signing Clinician's Name / Credentials   Signing clinician's name / [place] [id number]   Session Number   Session Number 5 [employer]   Ortho Goal 1   Goal Identifier 1.  STG   Goal Description Pt will be able to walk in her house w/o assistive device w/ slight to no limp.  8/28/20 MET   Target Date 08/31/20   Ortho Goal 2   Goal Identifier 2.  LTG   Goal Description Pt will be able to do stairs reciprocally w/ 1 rail and minimal difficulty  9/8/20 up reciprocal , down marked time   Target Date 09/29/20   Ortho Goal 3   Goal Identifier 3. LTG   Goal Description Pt will wake no > 4X/wk due to knee pain.   9/8/20 Not waking due to knee pain MET   Target Date 09/29/20   Ortho Goal 4   Goal Identifier 4.  LTG   Goal Description Pt will walk outdoors w/ SEC .  9/8/20 MET   Target Date 09/29/20   Ortho Goal 5   Goal Identifier 5.  LTG   Goal Description Pt will be independent and consistent w/ HEP.  9/8/20 MET   Target Date 09/29/20   Subjective Report   Subjective Report Pt notes intermittent lateral knee swelling.  Pt states L hip continues to bother  using salon pas patch.  Pt has returned to driving.     Objective Measures   Details R KF  AROM 128*   Objective Measure R KE lacks 3* AROM;  PROM 0*   Details hypomobile patella   Treatment Interventions   Interventions Therapeutic Procedure/Exercise   Therapeutic Procedure/exercise   Treatment Detail scifit seat 7 L 2 X 3 min.  KF on step X 8 (L hip limited).  6\" step up w/ UE support X 20.  SLS.  Gastroc incline board 30\" x 2.    Sit to stand from plinth knee level X 4 (noted a bite in R knee)  lower thigh  x 10 w/o UE assist.   Standing TKE w/ GTB X 20 ,  Seated dock kicks.  Seated KE 3# X 20.   Supine: heelslide w/ self assist X 10.  SLR X 20,  Seated KE w/ PT overpressure X 5 (including patellar glides).     Plan   Plan  Pt to cont on own w/ HEP.  " Discharge from physical therapy   Comments   Comments Pt goals 1, 3-5 and partially met goal 2

## 2020-11-10 DIAGNOSIS — Z11.59 ENCOUNTER FOR SCREENING FOR OTHER VIRAL DISEASES: Primary | ICD-10-CM

## 2020-11-16 DIAGNOSIS — Z20.822 COVID-19 RULED OUT: ICD-10-CM

## 2020-11-16 PROCEDURE — U0003 INFECTIOUS AGENT DETECTION BY NUCLEIC ACID (DNA OR RNA); SEVERE ACUTE RESPIRATORY SYNDROME CORONAVIRUS 2 (SARS-COV-2) (CORONAVIRUS DISEASE [COVID-19]), AMPLIFIED PROBE TECHNIQUE, MAKING USE OF HIGH THROUGHPUT TECHNOLOGIES AS DESCRIBED BY CMS-2020-01-R: HCPCS | Performed by: FAMILY MEDICINE

## 2020-11-17 LAB
LABORATORY COMMENT REPORT: NORMAL
SARS-COV-2 RNA SPEC QL NAA+PROBE: NEGATIVE
SARS-COV-2 RNA SPEC QL NAA+PROBE: NORMAL
SPECIMEN SOURCE: NORMAL
SPECIMEN SOURCE: NORMAL

## 2020-11-17 RX ORDER — ZOLEDRONIC ACID 5 MG/100ML
5 INJECTION, SOLUTION INTRAVENOUS ONCE
Status: CANCELLED
Start: 2020-11-17 | End: 2020-11-17

## 2020-11-17 RX ORDER — HEPARIN SODIUM,PORCINE 10 UNIT/ML
5 VIAL (ML) INTRAVENOUS
Status: CANCELLED | OUTPATIENT
Start: 2020-11-17

## 2020-11-17 RX ORDER — HEPARIN SODIUM (PORCINE) LOCK FLUSH IV SOLN 100 UNIT/ML 100 UNIT/ML
5 SOLUTION INTRAVENOUS
Status: CANCELLED | OUTPATIENT
Start: 2020-11-17

## 2020-11-18 ENCOUNTER — APPOINTMENT (OUTPATIENT)
Dept: LAB | Facility: CLINIC | Age: 70
End: 2020-11-18
Attending: FAMILY MEDICINE
Payer: COMMERCIAL

## 2020-11-18 ENCOUNTER — INFUSION THERAPY VISIT (OUTPATIENT)
Dept: INFUSION THERAPY | Facility: CLINIC | Age: 70
End: 2020-11-18
Attending: FAMILY MEDICINE
Payer: COMMERCIAL

## 2020-11-18 VITALS
BODY MASS INDEX: 22.14 KG/M2 | TEMPERATURE: 98.5 F | DIASTOLIC BLOOD PRESSURE: 70 MMHG | RESPIRATION RATE: 16 BRPM | OXYGEN SATURATION: 97 % | SYSTOLIC BLOOD PRESSURE: 113 MMHG | WEIGHT: 129 LBS | HEART RATE: 69 BPM

## 2020-11-18 DIAGNOSIS — M81.0 OSTEOPOROSIS, UNSPECIFIED OSTEOPOROSIS TYPE, UNSPECIFIED PATHOLOGICAL FRACTURE PRESENCE: Primary | ICD-10-CM

## 2020-11-18 LAB
CALCIUM SERPL-MCNC: 9 MG/DL (ref 8.5–10.1)
CREAT SERPL-MCNC: 0.75 MG/DL (ref 0.52–1.04)
GFR SERPL CREATININE-BSD FRML MDRD: 79 ML/MIN/{1.73_M2}

## 2020-11-18 PROCEDURE — 250N000011 HC RX IP 250 OP 636: Performed by: FAMILY MEDICINE

## 2020-11-18 PROCEDURE — 82310 ASSAY OF CALCIUM: CPT | Performed by: FAMILY MEDICINE

## 2020-11-18 PROCEDURE — 82565 ASSAY OF CREATININE: CPT | Performed by: FAMILY MEDICINE

## 2020-11-18 PROCEDURE — 36415 COLL VENOUS BLD VENIPUNCTURE: CPT

## 2020-11-18 PROCEDURE — 96365 THER/PROPH/DIAG IV INF INIT: CPT

## 2020-11-18 RX ORDER — ZOLEDRONIC ACID 5 MG/100ML
5 INJECTION, SOLUTION INTRAVENOUS ONCE
Status: COMPLETED | OUTPATIENT
Start: 2020-11-18 | End: 2020-11-18

## 2020-11-18 RX ADMIN — ZOLEDRONIC ACID 5 MG: 0.05 INJECTION, SOLUTION INTRAVENOUS at 14:12

## 2020-11-18 ASSESSMENT — PAIN SCALES - GENERAL: PAINLEVEL: MILD PAIN (3)

## 2020-11-18 NOTE — PROGRESS NOTES
Nursing Note  Marcia Kauffman presents today to [facility] for:   Chief Complaint   Patient presents with     Blood Draw     labs drawn via PIV by RN in lab      Infusion     Reclast     During today's [facility] appointment, orders from Dr. Alexia Rogers were completed.  Frequency: once yearly    Progress note:  Patient identification verified by name and date of birth.  Assessment completed.  Vitals recorded in Doc Flowsheets.  Patient was provided with education regarding medication/procedure and possible side effects.  Patient verbalized understanding.     present during visit today: Not Applicable.    Treatment Conditions: Patient's Creatinine Clearance and calcium level are within paramaters to administer medication per orders.    Premedications: were not ordered.    Drug Waste Record: No    Infusion length and rate:  infusion given over approximately 15 minutes at 400ml/hr.    Labs: drawn today, prior to appointment in second floor lab.    Vascular access: peripheral IV placed today prior to appointment in second floor lab.    Post Infusion Assessment:  Patient tolerated infusion without incident. Printed AVS given to patient, denies further questions/concerns. Discharged from [facility] in stable condition.    Discharge Plan:   Follow up plan of care with: ordering provider as scheduled. and after visit summary given to patient  Discharge instructions were reviewed with patient.  Patient/representative verbalized understanding of discharge instructions and all questions answered.  Patient discharged from [facility] in stable condition.    Teresita Maki RN       Administrations This Visit     zoledronic Acid (RECLAST) infusion 5 mg     Admin Date  11/18/2020 Action  New Bag Dose  5 mg Rate  400 mL/hr Route  Intravenous Administered By  Teresita Maki RN                /70   Pulse 69   Temp 98.5   F (36.9  C) (Oral)   Resp 16   Wt 58.5 kg (129 lb)   SpO2 97%   BMI 22.14 kg/m

## 2020-11-18 NOTE — NURSING NOTE
Chief Complaint   Patient presents with     Blood Draw     labs drawn via PIV by RN in lab      /65 (BP Location: Right arm, Patient Position: Sitting, Cuff Size: Adult Regular)   Pulse 77   Temp 98.5  F (36.9  C) (Oral)   Resp 18   Wt 58.5 kg (129 lb)   SpO2 98%   BMI 22.14 kg/m      PIV placed to LFA by RN in lab. Line flushed with normal saline. Pt tolerated well.  Checked in for next appointment.    Peri Diamond RN on 11/18/2020 at 1:34 PM

## 2020-11-18 NOTE — PATIENT INSTRUCTIONS
Dear Marcia Kauffman    Thank you for choosing [facility] ([facility]) for your Reclast infusion.  The following information is a summary of our appointment as well as important reminders.      Patient Education     Patient Education    Zoledronic Acid Solution for injection    Zoledronic Acid Solution for injection [Hypercalcemia of Malignancy]    Zoledronic Acid Solution for injection [Pagets Disease]  Zoledronic Acid Solution for injection  What is this medicine?  ZOLEDRONIC ACID (KAITLYNN le dron ik AS id) lowers the amount of calcium loss from bone. It is used to treat Paget's disease and osteoporosis in women.  This medicine may be used for other purposes; ask your health care provider or pharmacist if you have questions.  What should I tell my health care provider before I take this medicine?  They need to know if you have any of these conditions:    aspirin-sensitive asthma    cancer, especially if you are receiving medicines used to treat cancer    dental disease or wear dentures    infection    kidney disease    low levels of calcium in the blood    past surgery on the parathyroid gland or intestines    receiving corticosteroids like dexamethasone or prednisone    an unusual or allergic reaction to zoledronic acid, other medicines, foods, dyes, or preservatives    pregnant or trying to get pregnant    breast-feeding  How should I use this medicine?  This medicine is for infusion into a vein. It is given by a health care professional in a hospital or clinic setting.  Talk to your pediatrician regarding the use of this medicine in children. This medicine is not approved for use in children.  Overdosage: If you think you have taken too much of this medicine contact a poison control center or emergency room at once.  NOTE: This medicine is only for you. Do not share this medicine with others.  What if I miss a dose?  It is important not to miss your  dose. Call your doctor or health care professional if you are unable to keep an appointment.  What may interact with this medicine?    certain antibiotics given by injection    NSAIDs, medicines for pain and inflammation, like ibuprofen or naproxen    some diuretics like bumetanide, furosemide    teriparatide  This list may not describe all possible interactions. Give your health care provider a list of all the medicines, herbs, non-prescription drugs, or dietary supplements you use. Also tell them if you smoke, drink alcohol, or use illegal drugs. Some items may interact with your medicine.  What should I watch for while using this medicine?  Visit your doctor or health care professional for regular checkups. It may be some time before you see the benefit from this medicine. Do not stop taking your medicine unless your doctor tells you to. Your doctor may order blood tests or other tests to see how you are doing.  Women should inform their doctor if they wish to become pregnant or think they might be pregnant. There is a potential for serious side effects to an unborn child. Talk to your health care professional or pharmacist for more information.  You should make sure that you get enough calcium and vitamin D while you are taking this medicine. Discuss the foods you eat and the vitamins you take with your health care professional.  Some people who take this medicine have severe bone, joint, and/or muscle pain. This medicine may also increase your risk for jaw problems or a broken thigh bone. Tell your doctor right away if you have severe pain in your jaw, bones, joints, or muscles. Tell your doctor if you have any pain that does not go away or that gets worse.  Tell your dentist and dental surgeon that you are taking this medicine. You should not have major dental surgery while on this medicine. See your dentist to have a dental exam and fix any dental problems before starting this medicine. Take good care of your  teeth while on this medicine. Make sure you see your dentist for regular follow-up appointments.  What side effects may I notice from receiving this medicine?  Side effects that you should report to your doctor or health care professional as soon as possible:    allergic reactions like skin rash, itching or hives, swelling of the face, lips, or tongue    anxiety, confusion, or depression    breathing problems    changes in vision    eye pain    feeling faint or lightheaded, falls    jaw pain, especially after dental work    mouth sores    muscle cramps, stiffness, or weakness    redness, blistering, peeling or loosening of the skin, including inside the mouth    trouble passing urine or change in the amount of urine  Side effects that usually do not require medical attention (report to your doctor or health care professional if they continue or are bothersome):    bone, joint, or muscle pain    constipation    diarrhea    fever    hair loss    irritation at site where injected    loss of appetite    nausea, vomiting    stomach upset    trouble sleeping    trouble swallowing    weak or tired  This list may not describe all possible side effects. Call your doctor for medical advice about side effects. You may report side effects to FDA at 2-544-FDA-8663.  Where should I keep my medicine?  This drug is given in a hospital or clinic and will not be stored at home.  NOTE:This sheet is a summary. It may not cover all possible information. If you have questions about this medicine, talk to your doctor, pharmacist, or health care provider. Copyright  2016 Gold Standard             We look forward in seeing you on your next appointment here at [facility] ([facility]).  Please don t hesitate to call us at [phone] to reschedule any of your appointments or to speak with one of the [facility] registered nurses.  It was a pleasure taking care of you today.    Sincerely,    [facility]  [facility]  [address] [place] [address]  [address]  Phone:  [phone]

## 2020-11-18 NOTE — LETTER
11/18/2020         RE: Marcia Kauffman  [id number] [place] [address]        Dear Colleague,    Thank you for referring your patient, Marcia Kauffman, to the [facility]. Please see a copy of my visit note below.    Nursing Note  Marcia Kauffman presents [facility] for:   Chief Complaint   Patient presents with     Blood Draw     labs drawn via PIV by RN in lab      Infusion     Reclast     During today's [facility] appointment, orders from Dr. Alexia Rogers were completed.  Frequency: once yearly    Progress note:  Patient identification verified by name and date of birth.  Assessment completed.  Vitals recorded in Doc Flowsheets.  Patient was provided with education regarding medication/procedure and possible side effects.  Patient verbalized understanding.     present during visit today: Not Applicable.    Treatment Conditions: Patient's Creatinine Clearance and calcium level are within paramaters to administer medication per orders.    Premedications: were not ordered.    Drug Waste Record: No    Infusion length and rate:  infusion given over approximately 15 minutes at 400ml/hr.    Labs: drawn today, prior to appointment in second floor lab.    Vascular access: peripheral IV placed today prior to appointment in second floor lab.    Post Infusion Assessment:  Patient tolerated infusion without incident. Printed AVS given to patient, denies further questions/concerns. Discharged from [facility] in stable condition.    Discharge Plan:   Follow up plan of care with: ordering provider as scheduled. and after visit summary given to patient  Discharge instructions were reviewed with patient.  Patient/representative verbalized understanding of discharge instructions and all questions answered.  Patient discharged from [facility] in stable  condition.    Teresita Maki, SEVERO       Administrations This Visit     zoledronic Acid (RECLAST) infusion 5 mg     Admin Date  11/18/2020 Action  New Bag Dose  5 mg Rate  400 mL/hr Route  Intravenous Administered By  Teresita Maki, RN                /70   Pulse 69   Temp 98.5  F (36.9  C) (Oral)   Resp 16   Wt 58.5 kg (129 lb)   SpO2 97%   BMI 22.14 kg/m          Again, thank you for allowing me to participate in the care of your patient.        Sincerely,        [facility]

## 2020-12-13 DIAGNOSIS — Z11.59 ENCOUNTER FOR SCREENING FOR OTHER VIRAL DISEASES: ICD-10-CM

## 2020-12-13 LAB
SARS-COV-2 RNA SPEC QL NAA+PROBE: NORMAL
SPECIMEN SOURCE: NORMAL

## 2020-12-13 PROCEDURE — U0003 INFECTIOUS AGENT DETECTION BY NUCLEIC ACID (DNA OR RNA); SEVERE ACUTE RESPIRATORY SYNDROME CORONAVIRUS 2 (SARS-COV-2) (CORONAVIRUS DISEASE [COVID-19]), AMPLIFIED PROBE TECHNIQUE, MAKING USE OF HIGH THROUGHPUT TECHNOLOGIES AS DESCRIBED BY CMS-2020-01-R: HCPCS | Performed by: OPHTHALMOLOGY

## 2020-12-14 LAB
LABORATORY COMMENT REPORT: NORMAL
SARS-COV-2 RNA SPEC QL NAA+PROBE: NEGATIVE
SPECIMEN SOURCE: NORMAL

## 2020-12-15 ENCOUNTER — ANESTHESIA EVENT (OUTPATIENT)
Dept: SURGERY | Facility: CLINIC | Age: 70
End: 2020-12-15
Payer: COMMERCIAL

## 2020-12-15 ASSESSMENT — ENCOUNTER SYMPTOMS: DYSRHYTHMIAS: 1

## 2020-12-15 NOTE — ANESTHESIA PREPROCEDURE EVALUATION
Anesthesia Pre-Procedure Evaluation    Patient: Marcia Kauffman   MRN: [id number] : 1950          Preoperative Diagnosis: Cataract [H26.9]    Procedure(s):  Cataract Removal with Implant--symfony lens    Past Medical History:   Diagnosis Date     Endometriosis, site unspecified      Herpes simplex without mention of complication     HSV 2     MEDICAL HISTORY OF -     L4/5 spondololisthesis     PONV (postoperative nausea and vomiting)      Postmenopausal atrophic vaginitis 2004    Estring vag ring-stopped     Premature ventricular contractions 2007     Radial styloid tenosynovitis     arthritis in back and wrists     Spinal stenosis, lumbar region, without neurogenic claudication      Symptomatic menopausal or female climacteric states     prev on HRT-orthoprefest, effexor, paxil     Past Surgical History:   Procedure Laterality Date     ARTHROPLASTY KNEE Right 2020    Procedure: ARTHROPLASTY, KNEE, TOTAL;  Surgeon: Dean Padgett MD;  Location: [facility] OR     ARTHROSCOPY KNEE RT/LT  2010    Right knee, torn meniscus     BREAST LUMPECTOMY, RT/LT  3/1991    Right benign lump     CHOLECYSTECTOMY, OPEN  1981     COLONOSCOPY  2016     FUSION SPINE POSTERIOR TWO LEVELS Left 10/20/2015    Procedure: FUSION SPINE POSTERIOR TWO LEVELS;  Surgeon: Leo Dixon MD;  Location: [facility] OR     H ABLATION PVC  2007     HC EXCIS TENDON SHEATH LESION FOREARM/WRIST      treatment for frozen thumb     HYSTEROSCOPY       LAPAROSCOPY DIAGNOSTIC (GYN)  1984    3 total scopes for fertility     OPTICAL TRACKING SYSTEM FUSION SPINE POSTERIOR LUMBAR THREE+ LEVELS N/A 2018    Procedure: OPTICAL TRACKING SYSTEM FUSION SPINE POSTERIOR LUMBAR THREE+ LEVELS;  O-Arm/Stealth Assisted Lumbar 2 To Sacral 1 Posterior Instrumented Fusion, Pelvic Fixation, Revision Of Lumbar 4-5 Instrumentation, Transforaminal Interbody Fusion with bone morphogenic protein, Oshea-Limon Osteotomies,  Lumbar 2-3, 3-4, 4-5;   Surgeon: Nanette Beckford MD;  Location: [facility] OR     SURGICAL HISTORY OF -   95    Excision of cyst on back       Anesthesia Evaluation     . Pt has had prior anesthetic.     History of anesthetic complications   - PONV        ROS/MED HX    ENT/Pulmonary:       Neurologic:     (+)neuropathy     Cardiovascular: Comment: S/p ablation    (+) Dyslipidemia, ----. : . . . :. dysrhythmias (hx of VT) Other, a-fib and PVCs, valvular problems/murmurs type: MR . Previous cardiac testing Echodate:11/3/16results:Echocardiogram Complete: Result Notes  Older Notes  Notes Recorded by Carissa Law CMA on 11/3/2016 at 2:37 PM  Patient notified of results.  ------     Notes Recorded by Laura Jaquez DO on 11/3/2016 at 2:34 PM  Covering for primary/ordering provider     There is mild diastolic dysfunction - poor relaxation phase of the heart.  Mitral valve appears stable.  No other significant abnormalities are noted       Patient Result Comments for Echocardiogram Complete    Viewed by Marcia Mendoza on 2017  4:44 PM  Written by Laura Jaquez DO on 11/3/2016  2:34 PM  Covering for primary/ordering provider     There is mild diastolic dysfunction - poor relaxation phase of the heart.  Mitral valve appears stable.  No other significant abnormalities are noted  Results  Echocardiogram Complete (Order [id number])  External Result Report    External Result Report  Echocardiogram Complete  Order: [id number]  Status: Final result   Visible to patient: Yes (MyChart)   Next appt: 2021 at 11:30 AM in Lab (NB COVID LAB)   Dx: Mitral valve disorder  Details      Reading Physician Reading Date Result Priority  Karen Raya MD  [phone] 11/3/2016     Narrative & Impression    Interpretation Summary                    [facility]  Echocardiography Laboratory  [address] [place] [address]        Name: MARCIA MENDOZA  MRN: [id number]  :  1950  Study Date: 11/03/2016 09:47 AM  Age: 66 yrs  Gender: Female  Patient Location: [facility]  Reason For Study: Rheumatic mitral valve disease  Ordering Physician: DARA CHAUDHARY  Referring Physician: DARA CHAUDHARY  Performed By: Damaris Laureano RDCS     BSA: 1.6 m2  Height: 64 in  Weight: 126 lb  HR: 67  BP: 109/60 mmHg  ______________________________________________________________________________        Procedure  Complete Echo Adult.  ______________________________________________________________________________     Interpretation Summary     Left ventricular systolic function is normal.  Grade II or moderate diastolic dysfunction.  No regional wall motion abnormalities noted.  ______________________________________________________________________________           Left Ventricle  The left ventricle is normal in size. There is normal left ventricular wall  thickness. Left ventricular systolic function is normal. The visual ejection  fraction is estimated at 60-65%. Grade II or moderate diastolic dysfunction.  E by E prime ratio is greater than 15, that likely suggests increased left  ventricular filling pressures. No regional wall motion abnormalities noted. A  false chord is noted (normal variant). There is prominent apical  trabeculation.     Right Ventricle  Borderline right ventricular enlargement. The right ventricular systolic  function is normal.  Atria  The left atrium is mildly dilated. Right atrium not well visualized. The  right atrium is mildly dilated. Cannot exclude interatrial communication but  no significant shunts are seen on present images.     Mitral Valve  Both mitral leaflets appear thickened, the anterior leaflet more prominently  myxomatous. There is bileaflet prolapse. There is mild-moderate eccentric,  posteriorly directed mitral regurgitation.     Tricuspid Valve  The tricuspid valve is not well visualized, but is grossly normal. There is  mild to moderate (1-2+) tricuspid  regurgitation. The right ventricular  systolic pressure is approximated at 24.7 mmHg plus the right atrial  pressure. Normal IVC (1.5-2.5cm) with >50% respiratory collapse; right atrial  pressure is estimated at 5-10mmHg.     Aortic Valve  The aortic valve is trileaflet. There is mild trileaflet aortic sclerosis. No  aortic regurgitation is present. No hemodynamically significant valvular  aortic stenosis.     Pulmonic Valve  The pulmonic valve is not well visualized. There is no pulmonic valvular  regurgitation. Normal pulmonic valve velocity.     Vessels  The aortic root is normal size. Normal size ascending aorta. The IVC is  normal in size and reactivity with respiration, suggesting normal central  venous pressure.  Pericardium  There is no pericardial effusion.     Rhythm  The rhythm was normal sinus.     ______________________________________________________________________________  MMode/2D Measurements & Calculations  IVSd: 1.0 cm  LVIDd: 4.1 cm  LVIDs: 2.8 cm  LVPWd: 1.1 cm  FS: 31.4 %  EDV(Teich): 73.4 ml  ESV(Teich): 29.5 ml  LV mass(C)d: 143.1 grams  Ao root diam: 3.2 cm  asc Aorta Diam: 2.5 cm           Doppler Measurements & Calculations  MV E max joaquin: 89.5 cm/sec  MV A max joaquin: 71.4 cm/sec  MV E/A: 1.3  MV dec slope: 299.6 cm/sec2  MV dec time: 0.30 sec  TR max joaquin: 248.6 cm/sec  TR max P.7 mmHg  Lateral E/e': 17.7  Medial E/e': 17.0           ______________________________________________________________________________        Report approved by: Aury Delatorre 2016 02:13 PM           Specimen Collected: 16 09:47 Last Resulted: 16 14:13 Order Details View Encounter Lab and Collection Details Routing Result History      Result Communications      Result Notes    Older Notes  Notes Recorded by Carissa Law CMA on 11/3/2016 at 2:37 PM  Patient notified of results.  ------     Notes Recorded by Laura Jaquez DO on 11/3/2016 at 2:34 PM  Covering for  primary/ordering provider     There is mild diastolic dysfunction - poor relaxation phase of the heart.  Mitral valve appears stable.  No other significant abnormalities are noted        All Reviewers List    Kimberly Street APRN CNP on 11/4/2016 14:15  Carissa Law CMA on 11/3/2016 14:37  Laura Jaquez DO on 11/3/2016 14:34  Encounter    View Encounter       Lab Information    RADIOLOGY RESULTS      Signed    Electronically signed by Karen Raya MD on 11/3/16 at 1413 CDT  Additional Information    Specimen ID Bill Type Client ID  [id number]         Specimen Date Taken Specimen Time Taken Specimen Received Date Specimen Received Time Result Date Result Time  Nov 3, 2016  9:47 AM   Nov 3, 2016  2:13 PM  Recipient List for Orders  Sent From To Cc'd Forwarded To Results         Echocardiogram Complete ([id number])           Echocardiogram Complete ([id number])         Patient Release Status:     This result is viewable by the patient in MyChart.  Last viewed in [employer]:     1/13/2017  4:44 PM  By:     Marcia Kauffman       Exam Information    Exam Date Exam Time Exam Date Exam Time Accession # Performing Department Results   11/3/16  9:47 AM 11/3/16 10:19 AM [id number] [facility]   Order History  Inpatient  Date/Time Action Taken User Additional Information  11/03/16 0945 Release Tavia Martínez From Order:[id number]  11/03/16 0948 Result Damaris Laureano Ana In process  11/03/16 1413 Result Interface, Radiant Ib Two Final    date: results: date:7/9/20 results:SR date: results:          METS/Exercise Tolerance:  >4 METS   Hematologic:  - neg hematologic  ROS       Musculoskeletal: Comment: Spinal stenosis  SI joint pain  Spondylosis with lumbar radiculopathy  Osteopenia  Radial styloid tenosynovitis  Spinal fusion  (+)  other musculoskeletal- Spondylosis with radiculapathy, S/P lumbar fusion      GI/Hepatic:  - neg GI/hepatic ROS      "  Renal/Genitourinary: Comment: Stress incontinence - ROS Renal section negative       Endo:  - neg endo ROS       Psychiatric:  - neg psychiatric ROS       Infectious Disease:  - neg infectious disease ROS       Malignancy:      - no malignancy   Other: Comment: HSV   - neg other ROS                      Physical Exam  Normal systems: cardiovascular, pulmonary and dental    Airway   Mallampati: II  TM distance: >3 FB  Neck ROM: full    Dental     Cardiovascular       Pulmonary             Lab Results   Component Value Date    WBC 7.7 06/20/2018    HGB 9.4 (L) 07/10/2020    HCT 35.8 06/20/2018     06/20/2018    SED 15 01/17/2014     07/10/2020    POTASSIUM 4.7 07/10/2020    CHLORIDE 106 07/10/2020    CO2 29 07/10/2020    BUN 21 07/10/2020    CR 0.75 11/18/2020     (H) 07/10/2020    NAOMI 9.0 11/18/2020    PHOS 3.5 10/14/2019    MAG 2.1 10/14/2019    ALBUMIN 3.7 10/12/2015    PROTTOTAL 7.4 03/25/2015    ALT 29 03/25/2015    AST 24 03/25/2015    ALKPHOS 82 03/25/2015    BILITOTAL 0.7 03/25/2015    PTT 29 06/20/2018    INR 0.95 06/20/2018    FIBR 205 05/14/2018    TSH 2.74 04/04/2012       Preop Vitals  BP Readings from Last 3 Encounters:   11/18/20 113/70   07/10/20 114/52   11/19/19 120/69    Pulse Readings from Last 3 Encounters:   11/18/20 69   07/10/20 89   11/19/19 79      Resp Readings from Last 3 Encounters:   11/18/20 16   07/10/20 16   11/19/19 16    SpO2 Readings from Last 3 Encounters:   11/18/20 97%   07/10/20 99%   11/19/19 96%      Temp Readings from Last 1 Encounters:   11/18/20 36.9  C (98.5  F) (Oral)    Ht Readings from Last 1 Encounters:   07/09/20 1.626 m (5' 4\")      Wt Readings from Last 1 Encounters:   11/18/20 58.5 kg (129 lb)    Estimated body mass index is 22.14 kg/m  as calculated from the following:    Height as of 7/9/20: 1.626 m (5' 4\").    Weight as of 11/18/20: 58.5 kg (129 lb).       Anesthesia Plan      History & Physical Review      ASA Status:  2 .    NPO Status:  " > 6 hours    Plan for MAC Reason for MAC:  Procedure to face, neck, head or breast           Postoperative Care      Consents  Anesthetic plan, risks, benefits and alternatives discussed with:  Patient..                 BRENT Avila CRNA

## 2020-12-15 NOTE — H&P
[facility]  TOTAL EYE CARE  [address]  [facility] [place] [address]  [phone]  Dept: [phone]    OPHTHALMOLOGY PRE-OPERATIVE  HISTORY AND PHYSICAL    DATE OF H/P:  2020    DATE OF SURGERY:  2020  PROCEDURE:  Procedure(s):  Cataract Removal with Implant--symfony lens, Left Eye  LENS IMPLANT:  [id number] +12.0  REFRACTIVE GOAL:  PL Sph  SURGEON:  Paresh Nichole MD    ANESTHESIA:  TOPICAL / MAC    OR CASE REQUIREMENTS:  Symfony non-toric IOL.    DEMOGRAPHICS:  Demographic Information on Marcia Kauffman:    Marcia Kauffman  Gender: female  : 1950  [address] [place] [address] [phone] (home)     Medical Record: [id number]  Social Security Number: [id number]  [facility] [address] [facility] - [facility] [address]  [facility] - [place], [place] - [address]  [facility]  FOR [place], [place] - [facility]  Primary Care Provider: Zhane Yost    Parent's names are: Data Unavailable (mother) and Data Unavailable (father).    Insurance: Payor: [facility] / Plan: [facility] MEDICARE NON [facility] PARTNERS / Product Type: HMO /     OCULAR HISTORY:  Cataracts, each eye.  Presbyopia.  Glaucoma suspect.    HISTORIES:  Past Medical History:   Diagnosis Date     Endometriosis, site unspecified      Herpes simplex without mention of complication     HSV 2     MEDICAL HISTORY OF -     L4/5 spondololisthesis     PONV (postoperative nausea and vomiting)      Postmenopausal atrophic vaginitis 2004    Estring vag ring-stopped     Premature ventricular contractions 2007     Radial styloid tenosynovitis     arthritis in back and wrists     Spinal stenosis, lumbar region, without neurogenic claudication      Symptomatic menopausal or female climacteric states     prev on HRT-orthoprefest, effexor, paxil       Past  Surgical History:   Procedure Laterality Date     ARTHROPLASTY KNEE Right 2020    Procedure: ARTHROPLASTY, KNEE, TOTAL;  Surgeon: Dean Padgett MD;  Location: [facility]     ARTHROSCOPY KNEE RT/LT  2010    Right knee, torn meniscus     BREAST LUMPECTOMY, RT/LT  3/1991    Right benign lump     CHOLECYSTECTOMY, OPEN  1981     COLONOSCOPY  2016     FUSION SPINE POSTERIOR TWO LEVELS Left 10/20/2015    Procedure: FUSION SPINE POSTERIOR TWO LEVELS;  Surgeon: Leo Dixon MD;  Location: [facility] OR     H ABLATION PVC  2007     HC EXCIS TENDON SHEATH LESION FOREARM/WRIST  2006    treatment for frozen thumb     HYSTEROSCOPY       LAPAROSCOPY DIAGNOSTIC (GYN)  1984    3 total scopes for fertility     OPTICAL TRACKING SYSTEM FUSION SPINE POSTERIOR LUMBAR THREE+ LEVELS N/A 2018    Procedure: OPTICAL TRACKING SYSTEM FUSION SPINE POSTERIOR LUMBAR THREE+ LEVELS;  O-Arm/Stealth Assisted Lumbar 2 To Sacral 1 Posterior Instrumented Fusion, Pelvic Fixation, Revision Of Lumbar 4-5 Instrumentation, Transforaminal Interbody Fusion with bone morphogenic protein, Oshea-Limon Osteotomies,  Lumbar 2-3, 3-4, 4-5;  Surgeon: Nanette Beckford MD;  Location[facility]     SURGICAL HISTORY OF -   95    Excision of cyst on back       Family History   Problem Relation Age of Onset     Breast Cancer Mother         cancer -free for 17 years, XRT     Arthritis Mother      Eye Disorder Mother         macular degeneration     Heart Disease Mother         murmur     Cerebrovascular Disease Mother      Diabetes Father              Arthritis Father      Arthritis Sister      Blood Disease Paternal Grandfather      Family History Negative No family hx of        Social History     Tobacco Use     Smoking status: Never Smoker     Smokeless tobacco: Never Used   Substance Use Topics     Alcohol use: Yes     Alcohol/week: 0.0 - 1.0 standard drinks     Comment: very little       MEDICATIONS:  No current  facility-administered medications for this encounter.      Current Outpatient Medications   Medication Sig     Acetaminophen (TYLENOL PO)      apixaban ANTICOAGULANT (ELIQUIS ANTICOAGULANT) 5 MG tablet Take 1 tablet (5 mg) by mouth daily , and start 0.5 tablet every evening     CALCIUM-VITAMIN D PO      Coenzyme Q10 (CO Q 10 PO) Take 1 chew tab by mouth every morning      GLUCOSAMINE CHONDR 500 COMPLEX OR CAPS 1 capsule daily at noon     metoprolol tartrate (LOPRESSOR) 25 MG tablet Take 12.5 mg by mouth 2 times daily      Multiple Vitamins-Minerals (VISION FORMULA/LUTEIN) TABS      acetaminophen (TYLENOL) 325 MG tablet Take 2 tablets (650 mg) by mouth every 4 hours as needed for other (mild pain)     cetirizine (ZYRTEC) 10 MG tablet Take 10 mg by mouth daily     DAILY MULTI VITAMIN/MINERALS OR 1 TABLET DAILY AT BREAKFAST ON HOLD FOR SURGERY SINCE 05/01/2018     hydrOXYzine (ATARAX) 25 MG tablet Take 1-2 tablets (25-50 mg) by mouth every 6 hours as needed for itching or anxiety (Take with pain meds.  Helps with nausea, muscle spasms)     ibuprofen (ADVIL/MOTRIN) 600 MG tablet Take 1 tablet (600 mg) by mouth every 8 hours as needed for pain (mild)     oxyCODONE (ROXICODONE) 5 MG tablet Take 1-2 tablets (5-10 mg) by mouth every 3 hours as needed for pain (Moderate to Severe)     Probiotic Product (PROBIOTIC PO) Take by mouth every other day     senna-docusate (SENOKOT-S/PERICOLACE) 8.6-50 MG tablet Take 1-2 tablets by mouth daily as needed for constipation Take while on oral narcotics to prevent or treat constipation.     UNABLE TO FIND Apply 0.25 tsp. topically every other day MEDICATION NAME: Bio- estrol, plant based hormone replacement therapy      UNCLASSIFIED OTC PRODUCT MISC RelaxaMag  1 capsule at bedtime as needed -  a unique magnesium supplement that helps improve sleep and stress tolerance in the evening     valACYclovir (VALTREX) 500 MG tablet Take 1 tablet (500 mg) by mouth 2 times daily For 3 days when  flares occur (Patient taking differently: Take 500 mg by mouth daily )       ALLERGIES:     Allergies   Allergen Reactions     Dust Mite Extract      Escitalopram Unknown     Nausea and headache     Hydrocodone Nausea and Vomiting     n/v     Meperidine Nausea and Vomiting     vomiting  vomiting  vomiting     Mold      Trees        PERTINENT SYSTEMS REVIEW:    1. Yes: AFib, s/p ablation - Do you have a history of heart attack, stroke, stent, bypass or surgery on an artery in the head, neck, heart or legs?  2. No - Do you ever have any pain or discomfort in your chest?  3. No - Do you have a history of  Heart Failure?  4. No - Are you troubled by shortness of breath when walking: On the level, up a slight hill or at night?  5. No - Do you currently have a cold, bronchitis or other respiratory infection?  6. No - Do you have a cough, shortness of breath or wheezing?  7. No - Do you sometimes get pains in the calves of your legs when you walk?  8. No - Do you or anyone in your family have previous history of blood clots?  9. No - Do you or does anyone in your family have a serious bleeding problem such as prolonged bleeding following surgeries or cuts?  10. No - Have you ever had problems with anemia or been told to take iron pills?  11. No - Have you had any abnormal blood loss such as black, tarry or bloody stools, or abnormal vaginal bleeding?  12. No - Have you ever had a blood transfusion?  13. No - Have you or any of your relatives ever had problems with anesthesia?  14. No - Do you have sleep apnea, excessive snoring or daytime drowsiness?  15. No - Do you have any prosthetic heart valves?  16. Yes: knee replacement - Do you have prosthetic joints?    EXAMINATION:  Vitals were reviewed                     Vison:  Va, right - 20/30, left - 20/30;   BAT, right - 20/80, left - 20/80;  HEENT:  Cataract, otherwise unremarkable.  LUNGS:  Clear  CV:  Regular rate and rhythm without murmur  ABD:  Soft and  nontender  NEURO:  Alert and nonfocal    IMPRESSION:  Patient cleared for ophthalmic surgery.  Low risk with monitored, light sedation.  I have assessed the patient's DVT risk, and no additional orders necessary.    PLAN:  Procedure(s):  Cataract Removal with Implant--symfony lens, Left Eye      Paresh Nichole MD

## 2020-12-16 ENCOUNTER — HOSPITAL ENCOUNTER (OUTPATIENT)
Facility: CLINIC | Age: 70
Discharge: HOME | End: 2020-12-16
Attending: OPHTHALMOLOGY | Admitting: OPHTHALMOLOGY
Payer: COMMERCIAL

## 2020-12-16 ENCOUNTER — HOSPITAL ENCOUNTER (OUTPATIENT)
Facility: CLINIC | Age: 70
Discharge: HOME | End: 2020-12-16
Attending: OPHTHALMOLOGY | Admitting: OPHTHALMOLOGY

## 2020-12-16 ENCOUNTER — ANESTHESIA (OUTPATIENT)
Dept: SURGERY | Facility: CLINIC | Age: 70
End: 2020-12-16
Payer: COMMERCIAL

## 2020-12-16 VITALS
WEIGHT: 129 LBS | TEMPERATURE: 98.8 F | HEART RATE: 71 BPM | HEIGHT: 64 IN | RESPIRATION RATE: 16 BRPM | SYSTOLIC BLOOD PRESSURE: 123 MMHG | OXYGEN SATURATION: 99 % | DIASTOLIC BLOOD PRESSURE: 67 MMHG | BODY MASS INDEX: 22.02 KG/M2

## 2020-12-16 PROCEDURE — 761N000008 HC RECOVERY CATRACT PACKAGE: Performed by: OPHTHALMOLOGY

## 2020-12-16 PROCEDURE — V2788 PRESBYOPIA-CORRECT FUNCTION: HCPCS | Performed by: OPHTHALMOLOGY

## 2020-12-16 PROCEDURE — 250N000009 HC RX 250: Performed by: NURSE ANESTHETIST, CERTIFIED REGISTERED

## 2020-12-16 PROCEDURE — 250N000011 HC RX IP 250 OP 636: Performed by: OPHTHALMOLOGY

## 2020-12-16 PROCEDURE — 258N000003 HC RX IP 258 OP 636: Performed by: NURSE ANESTHETIST, CERTIFIED REGISTERED

## 2020-12-16 PROCEDURE — 250N000011 HC RX IP 250 OP 636: Performed by: NURSE ANESTHETIST, CERTIFIED REGISTERED

## 2020-12-16 PROCEDURE — V2632 POST CHMBR INTRAOCULAR LENS: HCPCS | Performed by: OPHTHALMOLOGY

## 2020-12-16 PROCEDURE — 250N000009 HC RX 250: Performed by: OPHTHALMOLOGY

## 2020-12-16 PROCEDURE — 370N000004 HC ANESTHESIA CATARACT PACKAGE: Performed by: OPHTHALMOLOGY

## 2020-12-16 PROCEDURE — 360N000007 HC CATARACT SURGICAL PACKAGE: Performed by: OPHTHALMOLOGY

## 2020-12-16 RX ORDER — CYCLOPENTOLATE HYDROCHLORIDE 10 MG/ML
1 SOLUTION/ DROPS OPHTHALMIC
Status: DISCONTINUED | OUTPATIENT
Start: 2020-12-16 | End: 2020-12-16 | Stop reason: HOSPADM

## 2020-12-16 RX ORDER — BALANCED SALT SOLUTION 6.4; .75; .48; .3; 3.9; 1.7 MG/ML; MG/ML; MG/ML; MG/ML; MG/ML; MG/ML
SOLUTION OPHTHALMIC PRN
Status: DISCONTINUED | OUTPATIENT
Start: 2020-12-16 | End: 2020-12-16 | Stop reason: HOSPADM

## 2020-12-16 RX ORDER — LIDOCAINE HYDROCHLORIDE 20 MG/ML
JELLY TOPICAL PRN
Status: DISCONTINUED | OUTPATIENT
Start: 2020-12-16 | End: 2020-12-16 | Stop reason: HOSPADM

## 2020-12-16 RX ORDER — TROPICAMIDE 10 MG/ML
1 SOLUTION/ DROPS OPHTHALMIC
Status: COMPLETED | OUTPATIENT
Start: 2020-12-16 | End: 2020-12-16

## 2020-12-16 RX ORDER — SODIUM CHLORIDE, SODIUM LACTATE, POTASSIUM CHLORIDE, CALCIUM CHLORIDE 600; 310; 30; 20 MG/100ML; MG/100ML; MG/100ML; MG/100ML
INJECTION, SOLUTION INTRAVENOUS CONTINUOUS
Status: DISCONTINUED | OUTPATIENT
Start: 2020-12-16 | End: 2020-12-16 | Stop reason: HOSPADM

## 2020-12-16 RX ORDER — LIDOCAINE 40 MG/G
CREAM TOPICAL
Status: DISCONTINUED | OUTPATIENT
Start: 2020-12-16 | End: 2020-12-16 | Stop reason: HOSPADM

## 2020-12-16 RX ORDER — PHENYLEPHRINE HYDROCHLORIDE 25 MG/ML
1 SOLUTION/ DROPS OPHTHALMIC
Status: COMPLETED | OUTPATIENT
Start: 2020-12-16 | End: 2020-12-16

## 2020-12-16 RX ORDER — PROPARACAINE HYDROCHLORIDE 5 MG/ML
SOLUTION/ DROPS OPHTHALMIC PRN
Status: DISCONTINUED | OUTPATIENT
Start: 2020-12-16 | End: 2020-12-16 | Stop reason: HOSPADM

## 2020-12-16 RX ADMIN — TROPICAMIDE 1 DROP: 10 SOLUTION/ DROPS OPHTHALMIC at 09:02

## 2020-12-16 RX ADMIN — PHENYLEPHRINE HYDROCHLORIDE 1 DROP: 25 SOLUTION/ DROPS OPHTHALMIC at 09:02

## 2020-12-16 RX ADMIN — PHENYLEPHRINE HYDROCHLORIDE 1 DROP: 25 SOLUTION/ DROPS OPHTHALMIC at 09:13

## 2020-12-16 RX ADMIN — MIDAZOLAM 2 MG: 1 INJECTION INTRAMUSCULAR; INTRAVENOUS at 09:46

## 2020-12-16 RX ADMIN — TROPICAMIDE 1 DROP: 10 SOLUTION/ DROPS OPHTHALMIC at 09:13

## 2020-12-16 RX ADMIN — LIDOCAINE HYDROCHLORIDE 0.1 ML: 10 INJECTION, SOLUTION EPIDURAL; INFILTRATION; INTRACAUDAL; PERINEURAL at 09:12

## 2020-12-16 RX ADMIN — TROPICAMIDE 1 DROP: 10 SOLUTION/ DROPS OPHTHALMIC at 09:07

## 2020-12-16 RX ADMIN — SODIUM CHLORIDE, POTASSIUM CHLORIDE, SODIUM LACTATE AND CALCIUM CHLORIDE: 600; 310; 30; 20 INJECTION, SOLUTION INTRAVENOUS at 09:12

## 2020-12-16 RX ADMIN — PHENYLEPHRINE HYDROCHLORIDE 1 DROP: 25 SOLUTION/ DROPS OPHTHALMIC at 09:08

## 2020-12-16 ASSESSMENT — MIFFLIN-ST. JEOR: SCORE: 1080.14

## 2020-12-16 NOTE — OP NOTE
CATARACT OPERATIVE NOTE    PATIENT: Marcia Kauffman  DATE OF SURGERY: 12/16/2020  PREOPERATIVE DIAGNOSIS:  Senile Nuclear Cataract, Left eye  POSTOPERATIVE DIAGNOSIS:  Senile Nuclear Cataract, Left eye  OPERATIVE PROCEDURE:  Phacoemulsification and placement of Multifocal intraocular lens  SURGEON:  Paresh Nichole MD  ANESTHESIA:  Topical / MAC  EBL:  None  SPECIMENS:  None  COMPLICATIONS:  None    PROCEDURE:  The patient was brought to the operating room at [facility].  The left eye was prepped and draped in the usual fashion for cataract surgery.  A wire lid speculum was inserted.  A super sharp blade was used to make a paracentesis at the 5 O'clock position.  The super sharp blade was used to make a partial thickness temporal groove, which was 3 mm in length.  0.8 mL of non-preserved epi-Shugarcaine was injected into the anterior chamber.  Viscoelastic was used to inflate the anterior chamber through a cannula.  A 2.5 mm microkeratome was used to make a temporal clear corneal incision in a two-plane fashion.  A cystotome needle and forceps were used to make a capsulorrhexis.  Hydrodissection and hydrodelineation were performed with Balance Salt Solution.  The lens was then phacoemulsified and removed without complications.  The cortical material was removed with bimanual irrigation and aspiration.  The capsular bag was filled with viscoelastic.  A posterior chamber intraocular lens, preselected and recorded, was folded and inserted into the capsular bag.  The viscoelastic was removed with the irrigation and aspiration tip.  Balanced Salt Solution with Vigamox, 150mg/0.1mL, was used to refill the anterior chamber.  The wounds were checked for water tightness and required no suture.  The wire lid speculum was removed.  The patient's left eye was cleaned and a drop of each post-operative drop was placed, followed by a russell shield.  The patient tolerated the procedure well, and  there were no complications.      Paresh Nichole MD

## 2020-12-16 NOTE — ANESTHESIA POSTPROCEDURE EVALUATION
Patient: Macria Kauffman    Procedure(s):  Cataract Removal with Implant--symfony lens    Diagnosis:Cataract [H26.9]  Diagnosis Additional Information: No value filed.    Anesthesia Type:  MAC    Note:  Anesthesia Post Evaluation    Patient location during evaluation: Bedside  Patient participation: Able to fully participate in evaluation  Level of consciousness: awake and alert  Pain management: adequate  Airway patency: patent  Cardiovascular status: acceptable  Respiratory status: acceptable  Hydration status: acceptable  PONV: none     Anesthetic complications: None          Last vitals:  Vitals:    12/16/20 0843   BP: 122/75   Pulse: 70   Resp: 16   Temp: 37.1  C (98.8  F)   SpO2: 100%         Electronically Signed By: BRENT Cota CRNA  December 16, 2020  10:07 AM

## 2020-12-16 NOTE — ANESTHESIA CARE TRANSFER NOTE
Patient: Marcia Kauffman    Procedure(s):  Cataract Removal with Implant--symfony lens    Diagnosis: Cataract [H26.9]  Diagnosis Additional Information: No value filed.    Anesthesia Type:   MAC     Note:  Airway :Room Air  Patient transferred to:Phase II  Handoff Report: Identifed the Patient, Identified the Reponsible Provider, Reviewed the pertinent medical history, Discussed the surgical course, Reviewed Intra-OP anesthesia mangement and issues during anesthesia, Set expectations for post-procedure period and Allowed opportunity for questions and acknowledgement of understanding      Vitals: (Last set prior to Anesthesia Care Transfer)    CRNA VITALS  12/16/2020 0935 - 12/16/2020 1007      12/16/2020             Pulse:  72    SpO2:  99 %                Electronically Signed By: BRENT Cota CRNA  December 16, 2020  10:07 AM

## 2020-12-16 NOTE — PROGRESS NOTES
"SPIRITUAL HEALTH SERVICES  [facility] - SDS    Referral Source: Pt request    - Marcia stated she was \"not too worried\" about this surgery.  It was much less invasive than many of her other recent surgeries.  She mentioned a TKA and a 7 hour back surgery at [facility] that \"took a long time to recover from.\"    - She spoke about losing her mother this year, during this time of COVID.  She was 100 and the experience of supporting her though her dying process was difficult but, working with her , they were able to provide a meaningful experience.  Marcia said she sang, \"Day by Day\" for her.    - I provided prayer for a successful surgery and recovery.  Marcia said she would be back in two weeks for the other eye so \"maybe I'll see you again.\"    Plan: No follow up because of SDS.    Phu Yadav M.A., [facility]  Lead   [facility]  Office: [phone]    "

## 2021-01-03 DIAGNOSIS — Z11.59 ENCOUNTER FOR SCREENING FOR OTHER VIRAL DISEASES: ICD-10-CM

## 2021-01-03 PROCEDURE — U0003 INFECTIOUS AGENT DETECTION BY NUCLEIC ACID (DNA OR RNA); SEVERE ACUTE RESPIRATORY SYNDROME CORONAVIRUS 2 (SARS-COV-2) (CORONAVIRUS DISEASE [COVID-19]), AMPLIFIED PROBE TECHNIQUE, MAKING USE OF HIGH THROUGHPUT TECHNOLOGIES AS DESCRIBED BY CMS-2020-01-R: HCPCS | Performed by: OPHTHALMOLOGY

## 2021-01-03 PROCEDURE — U0005 INFEC AGEN DETEC AMPLI PROBE: HCPCS | Performed by: OPHTHALMOLOGY

## 2021-01-04 ENCOUNTER — ANESTHESIA EVENT (OUTPATIENT)
Dept: SURGERY | Facility: CLINIC | Age: 71
End: 2021-01-04
Payer: COMMERCIAL

## 2021-01-05 NOTE — H&P
[facility]  TOTAL EYE CARE  [address]  [facility] [address]  [phone]  Dept: [phone]    OPHTHALMOLOGY PRE-OPERATIVE  HISTORY AND PHYSICAL    DATE OF H/P:  2020    DATE OF SURGERY:  2021  PROCEDURE:  Procedure(s):  Cataract Removal with Implant--symfony lens, Right Eye  LENS IMPLANT:  [id number] +12.0  REFRACTIVE GOAL:  PL Sph  SURGEON:  Paresh Nichole MD    ANESTHESIA:  TOPICAL / MAC    OR CASE REQUIREMENTS:  Symfony non-toric IOL.    DEMOGRAPHICS:  Demographic Information on Marcia Kauffman:    Marcia Kauffman  Gender: female  : 1950  [address] [place] [address] [phone] (home)     Medical Record: [id number]  Social Security Number: [id number]  Pharmacy:    [facility] - [facility] [address] [facility] CTR - [facility] [address]  [facility] - [place], [place] - [address]  [facility]  FOR [place], [place] - [facility]  Primary Care Provider: Zhane Yost    Parent's names are: Data Unavailable (mother) and Data Unavailable (father).    Insurance: Payor: [facility] / Plan: [facility] MEDICARE NON [facility] PARTNERS / Product Type: HMO /     OCULAR HISTORY:  Cataracts, s/p IOL left eye.  Presbyopia.  Glacuoma suspect.    HISTORIES:  Past Medical History:   Diagnosis Date     Endometriosis, site unspecified      Herpes simplex without mention of complication     HSV 2     MEDICAL HISTORY OF -     L4/5 spondololisthesis     PONV (postoperative nausea and vomiting)      Postmenopausal atrophic vaginitis 2004    Estring vag ring-stopped     Premature ventricular contractions 2007     Radial styloid tenosynovitis     arthritis in back and wrists     Spinal stenosis, lumbar region, without neurogenic claudication      Symptomatic menopausal or female climacteric states     prev on HRT-orthoprefest, effexor, paxil        Past Surgical History:   Procedure Laterality Date     ARTHROPLASTY KNEE Right 2020    Procedure: ARTHROPLASTY, KNEE, TOTAL;  Surgeon: Dean Padgett MD;  Location: [facility]     ARTHROSCOPY KNEE RT/LT  2010    Right knee, torn meniscus     BREAST LUMPECTOMY, RT/LT  3/1991    Right benign lump     CHOLECYSTECTOMY, OPEN  1981     COLONOSCOPY  2016     FUSION SPINE POSTERIOR TWO LEVELS Left 10/20/2015    Procedure: FUSION SPINE POSTERIOR TWO LEVELS;  Surgeon: Leo Dixon MD;  Location: [facility] OR     H ABLATION PVC  2007     HC EXCIS TENDON SHEATH LESION FOREARM/WRIST  2006    treatment for frozen thumb     HYSTEROSCOPY       LAPAROSCOPY DIAGNOSTIC (GYN)  1984    3 total scopes for fertility     OPTICAL TRACKING SYSTEM FUSION SPINE POSTERIOR LUMBAR THREE+ LEVELS N/A 2018    Procedure: OPTICAL TRACKING SYSTEM FUSION SPINE POSTERIOR LUMBAR THREE+ LEVELS;  O-Arm/Stealth Assisted Lumbar 2 To Sacral 1 Posterior Instrumented Fusion, Pelvic Fixation, Revision Of Lumbar 4-5 Instrumentation, Transforaminal Interbody Fusion with bone morphogenic protein, Oshea-Limon Osteotomies,  Lumbar 2-3, 3-4, 4-5;  Surgeon: Nanette Beckford MD;  Location[facility]     PHACOEMULSIFICATION CLEAR CORNEA WITH DELUXE INTRAOCULAR LENS IMPLANT Left 2020    Procedure: Cataract Removal with Implant--symfony lens;  Surgeon: Paresh Nichole MD;  Location: [facility]     SURGICAL HISTORY OF -   95    Excision of cyst on back       Family History   Problem Relation Age of Onset     Breast Cancer Mother         cancer -free for 17 years, XRT     Arthritis Mother      Eye Disorder Mother         macular degeneration     Heart Disease Mother         murmur     Cerebrovascular Disease Mother      Diabetes Father              Arthritis Father      Arthritis Sister      Blood Disease Paternal Grandfather      Family History Negative No family hx of        Social History     Tobacco Use     Smoking  status: Never Smoker     Smokeless tobacco: Never Used   Substance Use Topics     Alcohol use: Yes     Alcohol/week: 0.0 - 1.0 standard drinks     Comment: very little       MEDICATIONS:  No current facility-administered medications for this encounter.      Current Outpatient Medications   Medication Sig     Acetaminophen (TYLENOL PO)      apixaban ANTICOAGULANT (ELIQUIS ANTICOAGULANT) 5 MG tablet Take 1 tablet (5 mg) by mouth daily , and start 0.5 tablet every evening     CALCIUM-VITAMIN D PO      Coenzyme Q10 (CO Q 10 PO) Take 1 chew tab by mouth every morning      GLUCOSAMINE CHONDR 500 COMPLEX OR CAPS 1 capsule daily at noon     metoprolol tartrate (LOPRESSOR) 25 MG tablet Take 12.5 mg by mouth 2 times daily      Multiple Vitamins-Minerals (VISION FORMULA/LUTEIN) TABS      UNABLE TO FIND Apply 0.25 tsp. topically every other day MEDICATION NAME: Bio- estrol, plant based hormone replacement therapy      UNCLASSIFIED OTC PRODUCT MISC RelaxaMag  1 capsule at bedtime as needed -  a unique magnesium supplement that helps improve sleep and stress tolerance in the evening     valACYclovir (VALTREX) 500 MG tablet Take 1 tablet (500 mg) by mouth 2 times daily For 3 days when flares occur (Patient taking differently: Take 500 mg by mouth daily )     acetaminophen (TYLENOL) 325 MG tablet Take 2 tablets (650 mg) by mouth every 4 hours as needed for other (mild pain)     cetirizine (ZYRTEC) 10 MG tablet Take 10 mg by mouth daily     DAILY MULTI VITAMIN/MINERALS OR 1 TABLET DAILY AT BREAKFAST ON HOLD FOR SURGERY SINCE 05/01/2018     hydrOXYzine (ATARAX) 25 MG tablet Take 1-2 tablets (25-50 mg) by mouth every 6 hours as needed for itching or anxiety (Take with pain meds.  Helps with nausea, muscle spasms)     ibuprofen (ADVIL/MOTRIN) 600 MG tablet Take 1 tablet (600 mg) by mouth every 8 hours as needed for pain (mild)     oxyCODONE (ROXICODONE) 5 MG tablet Take 1-2 tablets (5-10 mg) by mouth every 3 hours as needed for pain  (Moderate to Severe)     Probiotic Product (PROBIOTIC PO) Take by mouth every other day     senna-docusate (SENOKOT-S/PERICOLACE) 8.6-50 MG tablet Take 1-2 tablets by mouth daily as needed for constipation Take while on oral narcotics to prevent or treat constipation.       ALLERGIES:     Allergies   Allergen Reactions     Dust Mite Extract      Escitalopram Unknown     Nausea and headache     Hydrocodone Nausea and Vomiting     n/v     Meperidine Nausea and Vomiting     vomiting  vomiting  vomiting     Mold      Trees        PERTINENT SYSTEMS REVIEW:    1. Yes: AFib, s/p ablation - Do you have a history of heart attack, stroke, stent, bypass or surgery on an artery in the head, neck, heart or legs?  2. No - Do you ever have any pain or discomfort in your chest?  3. No - Do you have a history of  Heart Failure?  4. No - Are you troubled by shortness of breath when walking: On the level, up a slight hill or at night?  5. No - Do you currently have a cold, bronchitis or other respiratory infection?  6. No - Do you have a cough, shortness of breath or wheezing?  7. No - Do you sometimes get pains in the calves of your legs when you walk?  8. No - Do you or anyone in your family have previous history of blood clots?  9. No - Do you or does anyone in your family have a serious bleeding problem such as prolonged bleeding following surgeries or cuts?  10. No - Have you ever had problems with anemia or been told to take iron pills?  11. No - Have you had any abnormal blood loss such as black, tarry or bloody stools, or abnormal vaginal bleeding?  12. No - Have you ever had a blood transfusion?  13. No - Have you or any of your relatives ever had problems with anesthesia?  14. No - Do you have sleep apnea, excessive snoring or daytime drowsiness?  15. No - Do you have any prosthetic heart valves?  16. Yes: knee replacement - Do you have prosthetic joints?    EXAMINATION:  Vitals were reviewed                     Vison:  Va,  right - 20/30;   BAT, right - 20/80;  HEENT:  Cataract, otherwise unremarkable.  LUNGS:  Clear  CV:  Regular rate and rhythm without murmur  ABD:  Soft and nontender  NEURO:  Alert and nonfocal    IMPRESSION:  Patient cleared for ophthalmic surgery.  Low risk with monitored, light sedation.  I have assessed the patient's DVT risk, and no additional orders necessary.    PLAN:  Procedure(s):  Cataract Removal with Implant--symfony lens, Right Eye      Paresh Nichole MD

## 2021-01-06 ENCOUNTER — ANESTHESIA (OUTPATIENT)
Dept: SURGERY | Facility: CLINIC | Age: 71
End: 2021-01-06
Payer: COMMERCIAL

## 2021-01-06 ENCOUNTER — HOSPITAL ENCOUNTER (OUTPATIENT)
Facility: CLINIC | Age: 71
Discharge: HOME | End: 2021-01-06
Attending: OPHTHALMOLOGY | Admitting: OPHTHALMOLOGY

## 2021-01-06 ENCOUNTER — HOSPITAL ENCOUNTER (OUTPATIENT)
Facility: CLINIC | Age: 71
Discharge: HOME | End: 2021-01-06
Attending: OPHTHALMOLOGY | Admitting: OPHTHALMOLOGY
Payer: COMMERCIAL

## 2021-01-06 VITALS
BODY MASS INDEX: 22.02 KG/M2 | HEIGHT: 64 IN | TEMPERATURE: 98.5 F | DIASTOLIC BLOOD PRESSURE: 94 MMHG | HEART RATE: 65 BPM | OXYGEN SATURATION: 99 % | WEIGHT: 129 LBS | SYSTOLIC BLOOD PRESSURE: 102 MMHG | RESPIRATION RATE: 14 BRPM

## 2021-01-06 PROCEDURE — 360N000007 HC CATARACT SURGICAL PACKAGE: Performed by: OPHTHALMOLOGY

## 2021-01-06 PROCEDURE — 250N000011 HC RX IP 250 OP 636: Performed by: NURSE ANESTHETIST, CERTIFIED REGISTERED

## 2021-01-06 PROCEDURE — 370N000004 HC ANESTHESIA CATARACT PACKAGE: Performed by: OPHTHALMOLOGY

## 2021-01-06 PROCEDURE — V2788 PRESBYOPIA-CORRECT FUNCTION: HCPCS | Performed by: OPHTHALMOLOGY

## 2021-01-06 PROCEDURE — 258N000003 HC RX IP 258 OP 636: Performed by: OPHTHALMOLOGY

## 2021-01-06 PROCEDURE — V2632 POST CHMBR INTRAOCULAR LENS: HCPCS | Performed by: OPHTHALMOLOGY

## 2021-01-06 PROCEDURE — 250N000011 HC RX IP 250 OP 636: Performed by: OPHTHALMOLOGY

## 2021-01-06 PROCEDURE — 250N000009 HC RX 250: Performed by: OPHTHALMOLOGY

## 2021-01-06 PROCEDURE — 761N000008 HC RECOVERY CATRACT PACKAGE: Performed by: OPHTHALMOLOGY

## 2021-01-06 DEVICE — IMPLANTABLE DEVICE: Type: IMPLANTABLE DEVICE | Site: EYE | Status: FUNCTIONAL

## 2021-01-06 RX ORDER — LIDOCAINE HYDROCHLORIDE 20 MG/ML
JELLY TOPICAL PRN
Status: DISCONTINUED | OUTPATIENT
Start: 2021-01-06 | End: 2021-01-06 | Stop reason: HOSPADM

## 2021-01-06 RX ORDER — SODIUM CHLORIDE, SODIUM LACTATE, POTASSIUM CHLORIDE, CALCIUM CHLORIDE 600; 310; 30; 20 MG/100ML; MG/100ML; MG/100ML; MG/100ML
INJECTION, SOLUTION INTRAVENOUS CONTINUOUS
Status: DISCONTINUED | OUTPATIENT
Start: 2021-01-06 | End: 2021-01-06 | Stop reason: HOSPADM

## 2021-01-06 RX ORDER — BALANCED SALT SOLUTION 6.4; .75; .48; .3; 3.9; 1.7 MG/ML; MG/ML; MG/ML; MG/ML; MG/ML; MG/ML
SOLUTION OPHTHALMIC PRN
Status: DISCONTINUED | OUTPATIENT
Start: 2021-01-06 | End: 2021-01-06 | Stop reason: HOSPADM

## 2021-01-06 RX ORDER — TROPICAMIDE 10 MG/ML
1 SOLUTION/ DROPS OPHTHALMIC
Status: COMPLETED | OUTPATIENT
Start: 2021-01-06 | End: 2021-01-06

## 2021-01-06 RX ORDER — PROPARACAINE HYDROCHLORIDE 5 MG/ML
SOLUTION/ DROPS OPHTHALMIC PRN
Status: DISCONTINUED | OUTPATIENT
Start: 2021-01-06 | End: 2021-01-06 | Stop reason: HOSPADM

## 2021-01-06 RX ORDER — PHENYLEPHRINE HYDROCHLORIDE 25 MG/ML
1 SOLUTION/ DROPS OPHTHALMIC
Status: COMPLETED | OUTPATIENT
Start: 2021-01-06 | End: 2021-01-06

## 2021-01-06 RX ORDER — CYCLOPENTOLATE HYDROCHLORIDE 10 MG/ML
1 SOLUTION/ DROPS OPHTHALMIC
Status: DISCONTINUED | OUTPATIENT
Start: 2021-01-06 | End: 2021-01-06 | Stop reason: RX

## 2021-01-06 RX ADMIN — TROPICAMIDE 1 DROP: 10 SOLUTION/ DROPS OPHTHALMIC at 10:45

## 2021-01-06 RX ADMIN — TROPICAMIDE 1 DROP: 10 SOLUTION/ DROPS OPHTHALMIC at 10:35

## 2021-01-06 RX ADMIN — SODIUM CHLORIDE, POTASSIUM CHLORIDE, SODIUM LACTATE AND CALCIUM CHLORIDE: 600; 310; 30; 20 INJECTION, SOLUTION INTRAVENOUS at 10:48

## 2021-01-06 RX ADMIN — TROPICAMIDE 1 DROP: 10 SOLUTION/ DROPS OPHTHALMIC at 10:40

## 2021-01-06 RX ADMIN — MIDAZOLAM 2 MG: 1 INJECTION INTRAMUSCULAR; INTRAVENOUS at 11:09

## 2021-01-06 RX ADMIN — PHENYLEPHRINE HYDROCHLORIDE 1 DROP: 25 SOLUTION/ DROPS OPHTHALMIC at 10:35

## 2021-01-06 RX ADMIN — PHENYLEPHRINE HYDROCHLORIDE 1 DROP: 25 SOLUTION/ DROPS OPHTHALMIC at 10:40

## 2021-01-06 RX ADMIN — PHENYLEPHRINE HYDROCHLORIDE 1 DROP: 25 SOLUTION/ DROPS OPHTHALMIC at 10:45

## 2021-01-06 RX ADMIN — LIDOCAINE HYDROCHLORIDE 0.1 ML: 10 INJECTION, SOLUTION EPIDURAL; INFILTRATION; INTRACAUDAL; PERINEURAL at 10:48

## 2021-01-06 ASSESSMENT — ENCOUNTER SYMPTOMS: DYSRHYTHMIAS: 1

## 2021-01-06 ASSESSMENT — MIFFLIN-ST. JEOR: SCORE: 1080.14

## 2021-01-06 NOTE — OP NOTE
OPHTHALMOLOGY OPERATIVE NOTE    PATIENT: Marcia Kauffman  DATE OF SURGERY: 1/6/2021  PREOPERATIVE DIAGNOSIS:  Senile Nuclear Cataract, Right eye  POSTOPERATIVE DIAGNOSIS:  Senile Nuclear Cataract, Right eye  OPERATIVE PROCEDURE:  Phacoemulsification with placement of Multifocal intraocular lens  SURGEON:  Paresh Nichole MD  ANESTHESIA:  Topical / MAC  EBL:  None  SPECIMENS:  None  COMPLICATIONS:  None    PROCEDURE:  The patient was brought to the operating room at [facility].  The right eye was prepped and draped in the usual fashion for cataract surgery.  A wire lid speculum was inserted.  A super sharp blade was used to make a paracentesis at the 11 O'clock position.  The super sharp blade was used to make a partial thickness temporal groove, which was 3 mm in length.  0.8 mL of non-preserved epi-Shugarcaine was injected into the anterior chamber.  Viscoelastic was used to inflate the anterior chamber through a cannula.  A 2.5 mm microkeratome was used to make a temporal clear corneal incision in a two-plane fashion.  A cystotome needle and forceps were used to make a capsulorrhexis.  Hydrodissection and hydrodelineation were performed with Balance Salt Solution.  The lens was then phacoemulsified and removed without complications.  The cortical material was removed with bimanual irrigation and aspiration.  The capsular bag was filled with viscoelastic.  A posterior chamber intraocular lens, preselected and recorded, was folded and inserted into the capsular bag.  The viscoelastic was removed with the irrigation and aspiration tip.  Balanced Salt Solution with Vigamox, 150mg/0.1mL, was used to refill the anterior chamber.  The wounds were checked for water tightness and required no suture.  The wire lid speculum was removed.  The patient's right eye was cleaned and a drop of each post-operative drop was placed, followed by a russell shield.  The patient tolerated the procedure  well, and there were no complications.      Paresh Nichole MD

## 2021-01-06 NOTE — ANESTHESIA CARE TRANSFER NOTE
Patient: Marcia Kauffman    Procedure(s):  Cataract Removal with Implant--symfony lens    Diagnosis: Cataract [H26.9]  Diagnosis Additional Information: No value filed.    Anesthesia Type:   MAC     Note:  Airway :Room Air  Patient transferred to:Phase II  Handoff Report: Identifed the Patient, Identified the Reponsible Provider, Reviewed the pertinent medical history, Discussed the surgical course, Reviewed Intra-OP anesthesia mangement and issues during anesthesia, Set expectations for post-procedure period and Allowed opportunity for questions and acknowledgement of understanding      Vitals: (Last set prior to Anesthesia Care Transfer)    CRNA VITALS  1/6/2021 1059 - 1/6/2021 1130      1/6/2021             Pulse:  58    SpO2:  98 %                Electronically Signed By: Leroy Abel CRNA, APRN CRNA  January 6, 2021  11:30 AM

## 2021-01-06 NOTE — ANESTHESIA PREPROCEDURE EVALUATION
Anesthesia Pre-Procedure Evaluation    Patient: Marcia Kauffman   MRN: [id number] : 1950          Preoperative Diagnosis: Cataract [H26.9]    Procedure(s):  Cataract Removal with Implant--symfony lens    Past Medical History:   Diagnosis Date     Endometriosis, site unspecified      Herpes simplex without mention of complication     HSV 2     MEDICAL HISTORY OF -     L4/5 spondololisthesis     PONV (postoperative nausea and vomiting)      Postmenopausal atrophic vaginitis 2004    Estring vag ring-stopped     Premature ventricular contractions 2007     Radial styloid tenosynovitis     arthritis in back and wrists     Spinal stenosis, lumbar region, without neurogenic claudication      Symptomatic menopausal or female climacteric states     prev on HRT-orthoprefest, effexor, paxil     Past Surgical History:   Procedure Laterality Date     ARTHROPLASTY KNEE Right 2020    Procedure: ARTHROPLASTY, KNEE, TOTAL;  Surgeon: Dean Padgett MD;  Location: [facility] OR     ARTHROSCOPY KNEE RT/LT  2010    Right knee, torn meniscus     BREAST LUMPECTOMY, RT/LT  3/1991    Right benign lump     CHOLECYSTECTOMY, OPEN  1981     COLONOSCOPY  2016     FUSION SPINE POSTERIOR TWO LEVELS Left 10/20/2015    Procedure: FUSION SPINE POSTERIOR TWO LEVELS;  Surgeon: Leo Dixon MD;  Location: [facility] OR     H ABLATION PVC  2007     HC EXCIS TENDON SHEATH LESION FOREARM/WRIST      treatment for frozen thumb     HYSTEROSCOPY       LAPAROSCOPY DIAGNOSTIC (GYN)  1984    3 total scopes for fertility     OPTICAL TRACKING SYSTEM FUSION SPINE POSTERIOR LUMBAR THREE+ LEVELS N/A 2018    Procedure: OPTICAL TRACKING SYSTEM FUSION SPINE POSTERIOR LUMBAR THREE+ LEVELS;  O-Arm/Stealth Assisted Lumbar 2 To Sacral 1 Posterior Instrumented Fusion, Pelvic Fixation, Revision Of Lumbar 4-5 Instrumentation, Transforaminal Interbody Fusion with bone morphogenic protein, Oshea-Limon Osteotomies,  Lumbar 2-3, 3-4, 4-5;   Surgeon: Nanette Beckford MD;  Location: [facility] OR     PHACOEMULSIFICATION CLEAR CORNEA WITH DELUXE INTRAOCULAR LENS IMPLANT Left 12/16/2020    Procedure: Cataract Removal with Implant--symfony lens;  Surgeon: Paresh Nichole MD;  Location: [facility]     SURGICAL HISTORY OF -   09/14/95    Excision of cyst on back       Anesthesia Evaluation     . Pt has had prior anesthetic.     History of anesthetic complications   - PONV        ROS/MED HX    ENT/Pulmonary:       Neurologic:     (+)neuropathy     Cardiovascular: Comment: S/p ablation    (+) Dyslipidemia, ----. : . . . :. dysrhythmias (hx of VT) Other, a-fib and PVCs, valvular problems/murmurs type: MR . Previous cardiac testing Echodate:11/3/16results:Echocardiogram Complete: Result Notes  Older Notes  Notes Recorded by Carissa Law CMA on 11/3/2016 at 2:37 PM  Patient notified of results.  ------     Notes Recorded by Laura Jaquez DO on 11/3/2016 at 2:34 PM  Covering for primary/ordering provider     There is mild diastolic dysfunction - poor relaxation phase of the heart.  Mitral valve appears stable.  No other significant abnormalities are noted       Patient Result Comments for Echocardiogram Complete    Viewed by Marcia Kauffman on 1/13/2017  4:44 PM  Written by Laura Jaquez DO on 11/3/2016  2:34 PM  Covering for primary/ordering provider     There is mild diastolic dysfunction - poor relaxation phase of the heart.  Mitral valve appears stable.  No other significant abnormalities are noted  Results  Echocardiogram Complete (Order [id number])  External Result Report    External Result Report  Echocardiogram Complete  Order: [id number]  Status: Final result   Visible to patient: Yes (MyChart)   Next appt: 01/03/2021 at 11:30 AM in Lab (NB COVID LAB)   Dx: Mitral valve disorder  Details      Reading Physician Reading Date Result Priority  Karen Raya MD  [phone] 11/3/2016     Narrative &  Impression    Interpretation Summary                    [facility]  Echocardiography Laboratory  [address]  [place] [address]        Name: MUKUND MENDOZA  MRN: [id number]  : 1950  Study Date: 2016 09:47 AM  Age: 66 yrs  Gender: Female  Patient Location: [facility]  Reason For Study: Rheumatic mitral valve disease  Ordering Physician: DARA CHAUDHARY  Referring Physician: DARA CHAUDHARY  Performed By: Damaris Laureano RDCS     BSA: 1.6 m2  Height: 64 in  Weight: 126 lb  HR: 67  BP: 109/60 mmHg  ______________________________________________________________________________        Procedure  Complete Echo Adult.  ______________________________________________________________________________     Interpretation Summary     Left ventricular systolic function is normal.  Grade II or moderate diastolic dysfunction.  No regional wall motion abnormalities noted.  ______________________________________________________________________________           Left Ventricle  The left ventricle is normal in size. There is normal left ventricular wall  thickness. Left ventricular systolic function is normal. The visual ejection  fraction is estimated at 60-65%. Grade II or moderate diastolic dysfunction.  E by E prime ratio is greater than 15, that likely suggests increased left  ventricular filling pressures. No regional wall motion abnormalities noted. A  false chord is noted (normal variant). There is prominent apical  trabeculation.     Right Ventricle  Borderline right ventricular enlargement. The right ventricular systolic  function is normal.  Atria  The left atrium is mildly dilated. Right atrium not well visualized. The  right atrium is mildly dilated. Cannot exclude interatrial communication but  no significant shunts are seen on present images.     Mitral Valve  Both mitral leaflets appear thickened, the anterior leaflet more prominently  myxomatous. There is bileaflet prolapse.  There is mild-moderate eccentric,  posteriorly directed mitral regurgitation.     Tricuspid Valve  The tricuspid valve is not well visualized, but is grossly normal. There is  mild to moderate (1-2+) tricuspid regurgitation. The right ventricular  systolic pressure is approximated at 24.7 mmHg plus the right atrial  pressure. Normal IVC (1.5-2.5cm) with >50% respiratory collapse; right atrial  pressure is estimated at 5-10mmHg.     Aortic Valve  The aortic valve is trileaflet. There is mild trileaflet aortic sclerosis. No  aortic regurgitation is present. No hemodynamically significant valvular  aortic stenosis.     Pulmonic Valve  The pulmonic valve is not well visualized. There is no pulmonic valvular  regurgitation. Normal pulmonic valve velocity.     Vessels  The aortic root is normal size. Normal size ascending aorta. The IVC is  normal in size and reactivity with respiration, suggesting normal central  venous pressure.  Pericardium  There is no pericardial effusion.     Rhythm  The rhythm was normal sinus.     ______________________________________________________________________________  MMode/2D Measurements & Calculations  IVSd: 1.0 cm  LVIDd: 4.1 cm  LVIDs: 2.8 cm  LVPWd: 1.1 cm  FS: 31.4 %  EDV(Teich): 73.4 ml  ESV(Teich): 29.5 ml  LV mass(C)d: 143.1 grams  Ao root diam: 3.2 cm  asc Aorta Diam: 2.5 cm           Doppler Measurements & Calculations  MV E max joaquin: 89.5 cm/sec  MV A max joaquin: 71.4 cm/sec  MV E/A: 1.3  MV dec slope: 299.6 cm/sec2  MV dec time: 0.30 sec  TR max joaquin: 248.6 cm/sec  TR max P.7 mmHg  Lateral E/e': 17.7  Medial E/e': 17.0           ______________________________________________________________________________        Report approved by: Aury Delatorre 2016 02:13 PM           Specimen Collected: 16 09:47 Last Resulted: 16 14:13 Order Details View Encounter Lab and Collection Details Routing Result History      Result Communications      Result  Notes    Older Notes  Notes Recorded by Carissa Law CMA on 11/3/2016 at 2:37 PM  Patient notified of results.  ------     Notes Recorded by Laura Jaquez DO on 11/3/2016 at 2:34 PM  Covering for primary/ordering provider     There is mild diastolic dysfunction - poor relaxation phase of the heart.  Mitral valve appears stable.  No other significant abnormalities are noted        All Reviewers List    Kimberly Street APRN CNP on 11/4/2016 14:15  Carissa Law CMA on 11/3/2016 14:37  Laura Jaquez DO on 11/3/2016 14:34  Encounter    View Encounter       Lab Information    RADIOLOGY RESULTS      Signed    Electronically signed by Karen Raya MD on 11/3/16 at 1413 CDT  Additional Information    Specimen ID Bill Type Client ID  [id number]         Specimen Date Taken Specimen Time Taken Specimen Received Date Specimen Received Time Result Date Result Time  Nov 3, 2016  9:47 AM   Nov 3, 2016  2:13 PM  Recipient List for Orders  Sent From To Cc'd Forwarded To Results         Echocardiogram Complete ([id number])           Echocardiogram Complete ([id number])         Patient Release Status:     This result is viewable by the patient in [email or web address].  Last viewed in [employer]:     1/13/2017  4:44 PM  By:     Marcia Kauffman       Exam Information    Exam Date Exam Time Exam Date Exam Time Accession # Performing Department Results   11/3/16  9:47 AM 11/3/16 10:19 AM [id number] [facility]   Order History  Inpatient  Date/Time Action Taken User Additional Information  11/03/16 0945 Release Tavia Martínez From Order:[id number]  11/03/16 0948 Result Damaris Laureano In process  11/03/16 1413 Result Interface, Radiant Ib Two Final    date: results: date:7/9/20 results:SR date: results:          METS/Exercise Tolerance:  >4 METS   Hematologic:  - neg hematologic  ROS       Musculoskeletal: Comment: Spinal stenosis  SI joint pain  Spondylosis with  "lumbar radiculopathy  Osteopenia  Radial styloid tenosynovitis  Spinal fusion  (+)  other musculoskeletal- Spondylosis with radiculapathy, S/P lumbar fusion      GI/Hepatic:  - neg GI/hepatic ROS       Renal/Genitourinary: Comment: Stress incontinence - ROS Renal section negative       Endo:  - neg endo ROS       Psychiatric:  - neg psychiatric ROS       Infectious Disease:  - neg infectious disease ROS       Malignancy:      - no malignancy   Other: Comment: HSV   - neg other ROS                      Physical Exam  Normal systems: cardiovascular, pulmonary and dental    Airway   Mallampati: II  TM distance: >3 FB  Neck ROM: full    Dental     Cardiovascular       Pulmonary             Lab Results   Component Value Date    WBC 7.7 06/20/2018    HGB 9.4 (L) 07/10/2020    HCT 35.8 06/20/2018     06/20/2018    SED 15 01/17/2014     07/10/2020    POTASSIUM 4.7 07/10/2020    CHLORIDE 106 07/10/2020    CO2 29 07/10/2020    BUN 21 07/10/2020    CR 0.75 11/18/2020     (H) 07/10/2020    NAOMI 9.0 11/18/2020    PHOS 3.5 10/14/2019    MAG 2.1 10/14/2019    ALBUMIN 3.7 10/12/2015    PROTTOTAL 7.4 03/25/2015    ALT 29 03/25/2015    AST 24 03/25/2015    ALKPHOS 82 03/25/2015    BILITOTAL 0.7 03/25/2015    PTT 29 06/20/2018    INR 0.95 06/20/2018    FIBR 205 05/14/2018    TSH 2.74 04/04/2012       Preop Vitals  BP Readings from Last 3 Encounters:   01/06/21 123/66   12/16/20 123/67   11/18/20 113/70    Pulse Readings from Last 3 Encounters:   01/06/21 67   12/16/20 71   11/18/20 69      Resp Readings from Last 3 Encounters:   01/06/21 18   12/16/20 16   11/18/20 16    SpO2 Readings from Last 3 Encounters:   01/06/21 100%   12/16/20 99%   11/18/20 97%      Temp Readings from Last 1 Encounters:   01/06/21 36.9  C (98.5  F) (Oral)    Ht Readings from Last 1 Encounters:   01/06/21 1.626 m (5' 4\")      Wt Readings from Last 1 Encounters:   01/06/21 58.5 kg (129 lb)    Estimated body mass index is 22.14 kg/m  as " "calculated from the following:    Height as of this encounter: 1.626 m (5' 4\").    Weight as of this encounter: 58.5 kg (129 lb).       Anesthesia Plan      History & Physical Review  History and physical reviewed and following examination; no interval change.    ASA Status:  3 .    NPO Status:  > 6 hours    Plan for MAC Reason for MAC:  Procedure to face, neck, head or breast           Postoperative Care      Consents  Anesthetic plan, risks, benefits and alternatives discussed with:  Patient..                 Leroy Abel CRNA, APRN CRNA  "

## 2021-01-06 NOTE — ANESTHESIA POSTPROCEDURE EVALUATION
Patient: Marcia Kauffman    Procedure(s):  Cataract Removal with Implant--symfony lens    Diagnosis:Cataract [H26.9]  Diagnosis Additional Information: No value filed.    Anesthesia Type:  MAC    Note:  Anesthesia Post Evaluation    Patient location during evaluation: Bedside  Patient participation: Able to fully participate in evaluation  Level of consciousness: awake and alert  Pain management: adequate  Airway patency: patent  Cardiovascular status: acceptable  Respiratory status: acceptable  Hydration status: acceptable  PONV: none     Anesthetic complications: None          Last vitals:  Vitals:    01/06/21 1009   BP: 123/66   Pulse: 67   Resp: 18   Temp: 36.9  C (98.5  F)   SpO2: 100%         Electronically Signed By: Leroy Abel CRNA, APRN CRNA  January 6, 2021  11:30 AM

## 2021-01-15 ENCOUNTER — HEALTH MAINTENANCE LETTER (OUTPATIENT)
Age: 71
End: 2021-01-15

## 2021-05-31 VITALS — BODY MASS INDEX: 21.85 KG/M2 | WEIGHT: 128 LBS | HEIGHT: 64 IN

## 2021-05-31 VITALS — HEIGHT: 64 IN | WEIGHT: 128 LBS | BODY MASS INDEX: 21.85 KG/M2

## 2021-06-11 NOTE — PROGRESS NOTES
A consult was placed to Dr. Beckford.  The reason for the consult was back pain.  The following XR were ordered to assess for alignment: AP/Lat and Flex/Ext.  Bella Melendez RN, CNRN

## 2021-06-11 NOTE — PROGRESS NOTES
Neurosurgery consultation was requested by: Dr. Coffey for evaluation of multilevel degenerative disc and facet disease.  Previous history of L4-5 posterior fusion in 2015  Pain: presents in the low back since surgery - intermittent positional ache  Radicular Pain is present: in both legs with walking  Lhermitte sign: denies  Motor complaints: bilateral leg weakness  Sensory complaints: persistent numbness in the left lateral thigh  Gait and balance issues: absent  Bowel or bladder issues: denies incontinence or saddle anesthesia  Duration of SX is: chronic  The symptoms are worse with: standing and walking  The symptoms are better with: sitting and laying  Injury: denies  Severity is: mild to moderate  Patient has tried the following conservative measures: failed PT  ODILON score is: 34%  Bella Melendez RN, CNRN

## 2021-06-11 NOTE — PROGRESS NOTES
Assessment/Plan:        Diagnoses and all orders for this visit:    Flatback syndrome of lumbar region  -     XR Scoliosis AP and Lateral Standing; Future; Expected date: 7/3/17    Sacroiliac joint dysfunction of right side    Spondylolisthesis of lumbar region    Other spondylosis with radiculopathy, lumbar region  -     CT Lumbar Spine Without Contrast; Future; Expected date: 7/3/17    Meralgia paresthetica of left side    History of lumbar fusion  -     CT Lumbar Spine Without Contrast; Future; Expected date: 7/3/17    Osteoporosis    Neck pain of over 3 months duration    Numbness and tingling in left hand  -     MR Cervical Spine Without Contrast; Future; Expected date: 7/3/17          It was a pleasure to evaluate Marcia Kauffman at the kind referral of Dr. Leo Dixon for low back pain and flatback syndrome.    On review of Dr. Dixon's note 6/19/17 at [facility] and intraoperative fluoro spot films from surgery 10/20/15 with L4-L5 fusion with Dr. Dixon, the patient had initial reduction of spondylolisthesis on intraoperative films followed by later pull out of L4 screws and recurrent slip, and L3-L4 spondylolisthesis development.    Marcia has flatback deformity with significant pelvic incidence to lumbar lordosis mismatch of greater than 30 degrees (normal is less than 10 degrees). Unfortunately, her standing long-cassette xrays do not completely image the femoral heads or allow measurement of chin-brow-vertical angle, which are relevant for quantification of spinal deformity and surgical planning of correction, so these will be repeated now. She has right sacroiliac joint irritation likely as a secondary consequence of the sagittal malalignment of her spine.    Marcia has movement on flexion-extension xrays at her prior L4-L5 spondylolisthesis within the fusion construct indicating likely pseudoarthrosis. I have ordered a CT scan of her lumbar spine to evaluate fusion status  at L4-5 and also at the L5-S1 level distal to the fusion, for relevance in surgical osteotomy/approach planning.    Marcia has longstanding neck pain exacerbated by neck extension, and left thumb/1st finger tingling and numbness potential for C6 radiculopathy vs carpal tunnel syndrome. I have ordered an MRI of her cervical spine to ensure that there is no spinal cord compression and also to evaluate for C6 radiculopathy.  The patient did have exacerbation of her neck pain after her 3 hour lumbar surgery previously, and because spinal deformity correction surgery will increase in great magnitude the length of operative time, positioning related concerns for the cervical spine are highly relevant.    The patient has osteoporosis on DEXA scan in June 2017 with a T score of -3.3 in her radius.  She will therefore need to be on Forteo treatment for minimum of 3 months prior to and a year after spinal deformity correction fusion surgery in order to give her the best chance for arthrodesis.    I explained to her that additional adjuncts following her fusion to her osteoporosis may include a brace and or bone growth stimulator, and that we may make that decision at the time of surgery after assessing her spine bone quality.    We obtained the patient's permission to get her prior surgical records from Dr. Dixon's 2015 L4-5 fusion surgery at [facility] to evaluate instrumentation used, as well as her neurology consultation at the [facility] to evaluate her left lower extremity EMG results where she is being treated for meralgia paresthetica, I explained that we would not expect improvement of this with spinal deformity surgery and could see worsening or bilateral appearance.      I look forward to seeing the patient back with the completion of the above testing for further discussion of spinal deformity correction surgery.    I spent 80 minutes in patient care with 60 minutes spent in counseling face  "to face time and/or coordination of care.      Subjective:    Patient ID: Marcia Kauffman is a 66 y.o. female.    HPI     Marcia Kauffman presents with low back pain right greater than left, exacerbated by standing and walking, relieved minorly by rest, no relief with PT. She has left lateral and anterior thigh numbness. She has history of one prior spine surgery, an L4-L5 posterior instrumented fusion in 2015 by Dr. Leo Dixon. Since that time, she has had progressive worsening of low back pain after initially improving with surgery. Both legs ache, but no radiating pain on right. Right foot feels somewhat weak, she trips on the right side occasionally.    She had workup of left thigh numbness including EMG at the [facility], and per her report has meralgia paresthetica on the left that was treated with an injection around her groin with relief for two weeks.  She does have a tender spot at her inguinal crease on which palpation can reproduce her pain.    On review of Dr. Dixon's note 6/19/17 at [facility] and intraoperative fluoro spot films from surgery 10/20/15, the patient had initial reduction of spondylolisthesis on intraoperative films followed by later pull out of L4 screws and recurrent slip.     The patient has longstanding neck pain exacerbated by neck extension and movement and reading. She has left thumb and first finger tingling and numbness that wakes her from sleep. No prior EMG of LUE, no recent cervical spine imaging.    She has occasional urinary dribbling that sounds like stress incontinence. Hands feel \"arthritic\", left is painful overall with movement. Balance slowly worsening.    IMAGING:  Transitional segment anatomy:  MRI lumbar spine with and without contrast 6/8/2017 at [place]-transitional segment anatomy with rudimentary disc space at S1-S2.  Grade 2 spondylolisthesis at L4-L5.  Grade 1 spondylolisthesis at L3-L4.  Prior laminectomy and posterior lateral " instrumentation at L4-L5.  Right L2-L3 disc herniation with right lateral recess stenosis and central stenosis    Standing long-cassette xrays at 6/19/17 at [facility] do not include femoral heads or femurs or maxilla, therefore do not allow for accurate measurement of spinopelvic parameters or cervical alignment and will be repeated     Lumbar flex-ext xrays with femoral heads included 7/3/17 at [facility] indicate pelvic incidence of 65 degrees, lumbar lordosis of 29 degrees, there is incremental movement of L4 on L5 in flexion compared to extension within the L4-L5 fusion construct indicating potential pseudoarthrosis despite posterolateral bone present.     Review of Systems   Constitutional: Negative for activity change, appetite change, chills, fatigue, fever and unexpected weight change.   HENT: Negative for dental problem, mouth sores and trouble swallowing.    Eyes: Negative for visual disturbance.   Respiratory: Negative for shortness of breath.    Cardiovascular: Negative for leg swelling.   Gastrointestinal: Positive for constipation. Negative for abdominal pain, diarrhea, nausea and vomiting.   Endocrine: Negative for cold intolerance.   Genitourinary: Negative for difficulty urinating, dysuria, enuresis, frequency and urgency.   Musculoskeletal: Positive for arthralgias, back pain, gait problem, joint swelling, myalgias and neck pain. Negative for neck stiffness.   Skin: Negative for color change.   Allergic/Immunologic: Positive for environmental allergies. Negative for immunocompromised state.   Neurological: Positive for weakness and numbness. Negative for tremors, speech difficulty and headaches.   Hematological: Bruises/bleeds easily.   Psychiatric/Behavioral: The patient is not nervous/anxious.      Past Medical History:  Radial styloid tenosynovitis, endometriosis, herpes simplex virus type II, postmenopausal atrophic vaginitis    Past surgical history-  L4-L5 posterior instrumented fusion  10/20/2015 at [facility]/Dr. Dixon  Abdominal laparoscopy  Cholecystectomy, laparoscopic hysterectomy, excision of tendon sheath on the wrist as treatment for frozen thumb, right breast biopsy, catheter ablation of right ventricular outflow tract, right knee meniscal repair in 2010  Social History     Social History     Marital status:      Spouse name: N/A     Number of children: N/A     Years of education: N/A     Occupational History     Not on file.     Social History Main Topics     Smoking status: Never Smoker     Smokeless tobacco: Not on file     Alcohol use Not on file     Drug use: Not on file     Sexual activity: Not on file     Other Topics Concern     Not on file     Social History Narrative     No narrative on file     Family History-no history of scoliosis or any other inherited spine disorder        Objective:    Physical Exam   Constitutional: She is oriented to person, place, and time. She appears well-developed and well-nourished. She is cooperative. No distress.   HENT:   Head: Normocephalic and atraumatic.   Eyes: Conjunctivae are normal.   Neck: Normal range of motion. Neck supple. No spinous process tenderness and no muscular tenderness present. No tracheal deviation present.   Cardiovascular: Normal rate and regular rhythm.    Pulmonary/Chest: Effort normal and breath sounds normal.   Abdominal: Soft. Bowel sounds are normal. She exhibits no distension. There is no tenderness.   Musculoskeletal:   Cervical flexion/extension ROM: pain with extension to 10 degrees  Lumbar flexion/extension ROM: flexion to 70 degrees, extension not achieved to neutral    Forward-flexed posture with knees fully extended, unable to correct to neutral upright   Neurological: She is alert and oriented to person, place, and time. A sensory deficit is present. No cranial nerve deficit. She displays a negative Romberg sign. Gait abnormal. Coordination normal. She displays no Babinski's sign on the  right side. She displays no Babinski's sign on the left side.   Reflex Scores:       Bicep reflexes are 3+ on the right side and 3+ on the left side.       Brachioradialis reflexes are 3+ on the right side and 3+ on the left side.       Patellar reflexes are 1+ on the right side and 1+ on the left side.       Achilles reflexes are 1+ on the right side and 0 on the left side.  Antalgic gait  Decreased sensation right webbing of great toe    Strength:                                                LEFT      RIGHT  Deltoid                                     5            5  Bicep                                       5            5  Wrist Extensor                        4+            5   Tricep                                      5            5  Finger Flexion                         5             5  Finger Abduction                     4           5                                            4+          5    Hip Flexion                               5            5   Knee Extension                       5             5  Dorsiflexion                              5            4+  Extensor Hallucis Longus        5            3  Plantar Flexion                         5             5  Foot eversion                           5             5    No Lhermitte's, No Spurling's  No Radha's   No ankle clonus  Able to tandem walk      SI Joint EXAM:                                        LEFT      RIGHT  Vesna Finger Test            -             +  PSIS tenderness            +            +  Thigh Thrust                   -             +  FELICIANO                            -             -  Pelvic gapping                -            +  Pelvic compression        -              +  Gaenslen's                      -              +  Sacral Thrust                  -              +    Hip EXAM:  Axial loading/posterior     -              -  Impingement                                Medial femoral   Impingement:                  -               -       Skin: Skin is warm, dry and intact.   Psychiatric: She has a normal mood and affect. Her speech is normal and behavior is normal.

## 2021-06-12 NOTE — PROGRESS NOTES
Diagnoses and all orders for this visit:    Flatback syndrome of lumbar region  -     Ambulatory referral to Orthopedics    Sacroiliac joint dysfunction of right side    Spondylolisthesis of lumbar region    History of lumbar fusion    Osteoporosis    Numbness and tingling in left hand  -     Ambulatory referral to Orthopedic Surgery    Lumbar pseudoarthrosis            It was a pleasure to evaluate Marcia Kauffman at the kind referral of Dr. Leo Dixon for low back pain and flatback syndrome.     On review of Dr. Dixon's note 6/19/17 at [facility] and intraoperative fluoro spot films from surgery 10/20/15 with L4-L5 fusion with Dr. Dixon, the patient had initial reduction of spondylolisthesis on intraoperative films followed by later pull out of L4 screws and recurrent slip, and L3-L4 spondylolisthesis development.     Marcia has flatback deformity with significant pelvic incidence to lumbar lordosis mismatch of greater than 30 degrees (normal is less than 10 degrees). She has a fixed deformity of her spine with no flexibility in supine imaging. She has autofusion of her L5-S1 segment and a rudimentary disc at S1-S2 also, and she has serpiginous pseudoarthrosis at L4-L5 prior instrumentation with halo around left screws. She has right sacroiliac joint irritation likely as a secondary consequence of the sagittal malalignment of her spine.      She will require a T10-pelvis posterior fusion with 4 or more levels of interbody fusion with Oshea Limon osteotomies, and potentially a pedicle subtraction osteotomy due to her rigid >30 degrees PI-LL mismatch. Spinal deformity surgery of this magnitude is best performed with two deformity-trained spine surgeons, and because that is not available in [facility], I have referred her to see Dr. Hector Aldana at the [facility], for his opinion on her surgical plan, and I will be available to assist him as co-surgeon if he agrees with surgical  indication.      Marcia has longstanding neck pain exacerbated by neck extension, and left thumb/1st finger tingling and numbness; MRI of her cervical spine does not show any spinal cord compression and does not show left C6 nerve root compression. I have referred her to a hand surgeon for evaluation of her left wrist pain.  The patient did have exacerbation of her neck pain after her 3 hour lumbar surgery previously, and because spinal deformity correction surgery will increase in great magnitude the length of operative time, I cautioned her that while she may have worse pain in her neck after thoracolumbar surgery positioning/time, there is not a surgical indication for her cervical spine at this time, and we will take every possible precaution in positioning her.         The patient has osteoporosis on DEXA scan in June 2017 with a T score of -3.3 in her radius.  She will therefore need to be on Forteo treatment for minimum of 3 months prior to and a year after spinal deformity correction fusion surgery in order to give her the best chance for arthrodesis, and we would recommend that she have the full 24 months duration treatment; she has started this medication as of now for about one week, and there is no substitute equivalent  Other than this medication.     I explained to her that additional adjuncts following her fusion to her osteoporosis may include a brace and or bone growth stimulator, and that we may make that decision at the time of surgery after assessing her spine bone quality.     We obtained the patient's permission to get her prior surgical records from Dr. Dixon's 2015 L4-5 fusion surgery at [facility] to evaluate instrumentation used, as well as her neurology consultation at the [facility] to evaluate her left lower extremity EMG results where she is being treated for meralgia paresthetica, I explained that we would not expect improvement of this with spinal deformity  surgery and could see worsening or bilateral appearance.     I discussed surgical risk including bleeding, infection, nerve or spinal cord damage, failure to heal/failure to form fusion/instrumentation failure, proximal junctional kyphosis, CSF leak, weakness/paralysis, numbness, worsening pain or failure to improve including neuropathic pain, recurrence of problem, and potential for development of instability or adjacent segment disease, and potential need for further procedures or surgeries. I discussed potential of failure to improve or even worsening despite surgery.  I explained that hospital stay is on average 7 days, that she will need post-acute inpatient care at rehab or SNF until recovering to rehab level, and that it will be a year to maximal improvement, and that maximal improvement is usually reduction of pain and improvement of function by 30-40% on average, but that not everyone sees this benefit.    She understands and is motivated to proceed with surgery.        I spent 65 minutes in patient care with 60 minutes spent in counseling face to face time and/or coordination of care.        Subjective:    Patient ID: Marcia Kauffman is a 66 y.o. female.     WENDY Kennedy is here for followup on imaging    Marcia Kauffman presents with low back pain right greater than left, exacerbated by standing and walking, relieved minorly by rest, no relief with PT. She has left lateral and anterior thigh numbness. She has history of one prior spine surgery, an L4-L5 posterior instrumented fusion in 2015 by Dr. Leo Dixon. Since that time, she has had progressive worsening of low back pain after initially improving with surgery. Both legs ache, but no radiating pain on right. Right foot feels somewhat weak, she trips on the right side occasionally.     She had workup of left thigh numbness including EMG at the [facility], and per her report has meralgia paresthetica on the left that was treated with an injection  "around her groin with relief for two weeks.  She does have a tender spot at her inguinal crease on which palpation can reproduce her pain.     On review of Dr. Dixon's note 6/19/17 at [facility] and intraoperative fluoro spot films from surgery 10/20/15, the patient had initial reduction of spondylolisthesis on intraoperative films followed by later pull out of L4 screws and recurrent slip.      The patient has longstanding neck pain exacerbated by neck extension and movement and reading. She has left thumb and first finger tingling and numbness that wakes her from sleep. No prior EMG of LUE     She has occasional urinary dribbling that sounds like stress incontinence. Hands feel \"arthritic\", left is painful overall with movement. Balance slowly worsening.     IMAGING:  Transitional segment anatomy:  MRI lumbar spine with and without contrast 6/8/2017 at [place]-transitional segment anatomy with rudimentary disc space at S1-S2.  Grade 2 spondylolisthesis at L4-L5.  Grade 1 spondylolisthesis at L3-L4.  Prior laminectomy and posterior lateral instrumentation at L4-L5.  Right L2-L3 disc herniation with right lateral recess stenosis and central stenosis     Standing long-cassette xrays at 6/19/17 at [facility] do not include femoral heads or femurs or maxilla, therefore do not allow for accurate measurement of spinopelvic parameters or cervical alignment and will be repeated   Standing long-cassette xrays 7/15/17 at [facility] show normal cervical lordosis; pelvic incidence 65 degrees, lumbar lordosis 25 degrees.  SVA is 144mm positive  No scoliosis      Lumbar lordosis stays fixed at 22 degrees on supine opportunistic CT scan lumbar spine  CT lumbar spine 7/12/17 shows pseudoarthrosis at L4-5 and facet and disc space fusion at L5-S1 at non-operative level distally.     Lumbar flex-ext xrays with femoral heads included 7/3/17 at [facility] indicate pelvic incidence of 65 degrees, lumbar lordosis " of 25 degrees, there is incremental movement of L4 on L5 in flexion compared to extension within the L4-L5 fusion construct indicating potential pseudoarthrosis despite posterolateral bone present.      MRI cervical spine 7/12/17 shows only mild stenosis and no left C5-6 foraminal stenosis, so no explanation for left dorsal wrist pain at base of thumb.      Review of Systems   Constitutional: Negative for activity change, appetite change, chills, fatigue, fever and unexpected weight change.   HENT: Negative for dental problem, mouth sores and trouble swallowing.    Eyes: Negative for visual disturbance.   Respiratory: Negative for shortness of breath.    Cardiovascular: Negative for leg swelling.   Gastrointestinal: Positive for constipation. Negative for abdominal pain, diarrhea, nausea and vomiting.   Endocrine: Negative for cold intolerance.   Genitourinary: Negative for difficulty urinating, dysuria, enuresis, frequency and urgency.   Musculoskeletal: Positive for arthralgias, back pain, gait problem, joint swelling, myalgias and neck pain. Negative for neck stiffness.   Skin: Negative for color change.   Allergic/Immunologic: Positive for environmental allergies. Negative for immunocompromised state.   Neurological: Positive for weakness and numbness. Negative for tremors, speech difficulty and headaches.   Hematological: Bruises/bleeds easily.   Psychiatric/Behavioral: The patient is not nervous/anxious.       Past Medical History:  Radial styloid tenosynovitis, endometriosis, herpes simplex virus type II, postmenopausal atrophic vaginitis     Past surgical history-  L4-L5 posterior instrumented fusion 10/20/2015 at [facility]/Dr. Dixon  Abdominal laparoscopy  Cholecystectomy, laparoscopic hysterectomy, excision of tendon sheath on the wrist as treatment for frozen thumb, right breast biopsy, catheter ablation of right ventricular outflow tract, right knee meniscal repair in 2010   Social History     Social History            Social History     Marital status:        Spouse name: N/A     Number of children: N/A     Years of education: N/A          Occupational History     Not on file.           Social History Main Topics     Smoking status: Never Smoker     Smokeless tobacco: Not on file     Alcohol use Not on file     Drug use: Not on file     Sexual activity: Not on file           Other Topics Concern     Not on file          Social History Narrative     No narrative on file         Family History-no history of scoliosis or any other inherited spine disorder         Objective:    Physical Exam   Constitutional: She is oriented to person, place, and time. She appears well-developed and well-nourished. She is cooperative. No distress.   HENT:   Head: Normocephalic and atraumatic.   Eyes: Conjunctivae are normal.   Neck: Normal range of motion. Neck supple. No spinous process tenderness and no muscular tenderness present. No tracheal deviation present.   Cardiovascular: Normal rate and regular rhythm.    Pulmonary/Chest: Effort normal and breath sounds normal.   Abdominal: Soft. Bowel sounds are normal. She exhibits no distension. There is no tenderness.   Musculoskeletal:   Cervical flexion/extension ROM: pain with extension to 10 degrees  Lumbar flexion/extension ROM: flexion to 70 degrees, extension not achieved to neutral    Forward-flexed posture with knees fully extended, unable to correct to neutral upright   Neurological: She is alert and oriented to person, place, and time. A sensory deficit is present. No cranial nerve deficit. She displays a negative Romberg sign. Gait abnormal. Coordination normal. She displays no Babinski's sign on the right side. She displays no Babinski's sign on the left side.   Reflex Scores:       Bicep reflexes are 3+ on the right side and 3+ on the left side.       Brachioradialis reflexes are 3+ on the right side and 3+ on the left side.       Patellar reflexes are 1+  on the right side and 1+ on the left side.       Achilles reflexes are 1+ on the right side and 0 on the left side.  Antalgic gait  Decreased sensation right webbing of great toe    Strength:                                                LEFT      RIGHT  Deltoid                                     5            5  Bicep                                       5            5  Wrist Extensor                        4+            5   Tricep                                      5            5  Finger Flexion                         5             5  Finger Abduction                     4           5                                            4+          5    Hip Flexion                               5            5   Knee Extension                       5             5  Dorsiflexion                              5            4+  Extensor Hallucis Longus        5            3  Plantar Flexion                         5             5  Foot eversion                           5             5    No Lhermitte's, No Spurling's  No Radha's   No ankle clonus  Able to tandem walk      SI Joint EXAM:                                        LEFT      RIGHT  Vesna Finger Test            -             +  PSIS tenderness            +            +  Thigh Thrust                   -             +  FELICIANO                            -             -  Pelvic gapping                -            +  Pelvic compression        -              +  Gaenslen's                      -              +  Sacral Thrust                  -              +    Hip EXAM:  Axial loading/posterior     -              -  Impingement                                Medial femoral   Impingement:                  -              -       Skin: Skin is warm, dry and intact.   Psychiatric: She has a normal mood and affect. Her speech is normal and behavior is normal.

## 2021-06-12 NOTE — PROGRESS NOTES
Marcia is here to discuss results of her imaging. She was just started on Forteo last week by her primary. She states symptoms are unchanged since last seen in clinic. Marcia c/o lower back pain with bilateral leg pain and weakness and numbness in left lateral thigh.   ODILON today is 34%  ALBIN Chavez

## 2021-09-03 ENCOUNTER — TELEPHONE (OUTPATIENT)
Dept: FAMILY MEDICINE | Facility: CLINIC | Age: 71
End: 2021-09-03

## 2021-09-03 NOTE — TELEPHONE ENCOUNTER
[facility] Call Center    Phone Message    May a detailed message be left on voicemail: yes     Reason for Call: Other: Marcia is looking to schedule her DEXA at the [place] location, but needs the orders put in the system. Please call her when orders are placed, so she can schedule her appt. Thanks     Action Taken: Other: [facility]    Travel Screening: Not Applicable

## 2021-09-05 ENCOUNTER — HEALTH MAINTENANCE LETTER (OUTPATIENT)
Age: 71
End: 2021-09-05

## 2021-09-08 DIAGNOSIS — M81.0 SENILE OSTEOPOROSIS: Primary | ICD-10-CM

## 2021-09-09 NOTE — TELEPHONE ENCOUNTER
Called patient and informed her order has been placed. Pt verbalized understanding and denied questions/concerns.

## 2021-09-28 ENCOUNTER — HOSPITAL ENCOUNTER (OUTPATIENT)
Dept: BONE DENSITY | Facility: CLINIC | Age: 71
Discharge: HOME | End: 2021-09-28
Attending: FAMILY MEDICINE | Admitting: FAMILY MEDICINE
Payer: COMMERCIAL

## 2021-09-28 DIAGNOSIS — M81.0 SENILE OSTEOPOROSIS: ICD-10-CM

## 2021-09-28 PROCEDURE — 77080 DXA BONE DENSITY AXIAL: CPT

## 2021-09-29 NOTE — TELEPHONE ENCOUNTER
Pt and family called several times through out the day.  They were concerned about PT and home care orders.  Family and pt stated that she needed help at home with bathing and moving about.  They requested home healthcare orders.  Writer was able to contact NP.  NP will put the orders for HHC in tomorrow.   [No] : In the past 12 months have you used drugs other than those required for medical reasons? No [No falls in past year] : Patient reported no falls in the past year [Patient refused screening] : Patient refused screening [] : No [de-identified] : Physical therapy [de-identified] : Regular

## 2021-10-18 ENCOUNTER — OFFICE VISIT (OUTPATIENT)
Dept: FAMILY MEDICINE | Facility: CLINIC | Age: 71
End: 2021-10-18
Attending: FAMILY MEDICINE
Payer: COMMERCIAL

## 2021-10-18 VITALS
WEIGHT: 131.5 LBS | SYSTOLIC BLOOD PRESSURE: 127 MMHG | HEIGHT: 65 IN | DIASTOLIC BLOOD PRESSURE: 79 MMHG | BODY MASS INDEX: 21.91 KG/M2 | HEART RATE: 63 BPM

## 2021-10-18 DIAGNOSIS — M81.0 SENILE OSTEOPOROSIS: Primary | ICD-10-CM

## 2021-10-18 PROCEDURE — G0463 HOSPITAL OUTPT CLINIC VISIT: HCPCS

## 2021-10-18 PROCEDURE — 99215 OFFICE O/P EST HI 40 MIN: CPT | Performed by: FAMILY MEDICINE

## 2021-10-18 ASSESSMENT — MIFFLIN-ST. JEOR: SCORE: 1111.32

## 2021-10-18 NOTE — PATIENT INSTRUCTIONS
Check that the bone builder is not strontium  Do not use oyster shell calcium - it isn't absorbed well  If you need a supplement use calcium citrate   Set up IV reclast - Dr Bills will call you  Then will take a year off 11/2022-11/2023   Get a DXA then in 12/ [id number] and then we will decide what further treatment you might need.   Patient should take 1200mg of calcium/day in divided doses and vitamin D3 2000IU/day.  Dietary calcium is best   continue walking and be active

## 2021-10-18 NOTE — LETTER
10/18/2021       RE: Marcia Kauffman  [id number]  [place] [address]     Dear Colleague,    Thank you for referring your patient, Marcia Kauffman, to the [facility] [place] at [facility]. Please see a copy of my visit note below.    ASSESSMENT:  This is a 70-year-old postmenopausal female who has osteoporosis by original T-scores.  She initially was on Forteo for 2 years from July 2017 to July 2019.  She had a successful back surgery in 2018.  She took a year break and then had IV Reclast November 2019 and November 2020.  Her most recent DEXA in [place] shows the lowest T score being the radius.  However the score reflects the total radius,and by  using 33% of the radius which is the more accurate reading and that score  is an osteopenic T score.  Her gain in the hips is 40% in 2 years and that is unusually high and  I am suspecting that her new calcium supplement that she is taking called bone builder which was recommended by nutritionist might have strontium in it.  We discussed the negative effects of strontium that it builds bone but not strong bone.  I Suspect that her high percentage increase is related to that.  Overall her T-scores are osteopenic and  they have improved remarkably from her previous DEXA about 2 years ago.  We discussed using calcium carbonate or calcium citrate instead of oyster shell and the bone builder that she is using.  At this time I would suggest 1 more year of IV Reclast which we will set up.  She wants to do it in [place].  Then will take a break from November 2022 to November 2023.  Then repeat the DEXA in early 2024 and see where she is at at that time.  I strongly recommended that she stop the bone builder and that she use a calcium citrate or carbonate for supplement.    PLAN:    Check that the bone builder is not strontium  Do not use oyster shell calcium - it isn't absorbed well  If you  need a supplement use calcium citrate   Set up IV reclast - Dr Bills will call you  Then will take a year off 11/2022-11/2023   Get a DXA then in 12/ [id number] and then we will decide what further treatment you might need.   Patient should take 1200mg of calcium/day in divided doses and vitamin D3 2000IU/day.  Dietary calcium is best   continue walking and be active     Thank you for allowing me to participate in the care of your patient.  Please do not hesitate to call with questions or concerns.    Sincerely,    Alexia Rogers MD, PhD  CC Nanette Yost MD       Time note (e5, 40'): The total time (on the date of service) for this service was 65 minutes, including discussion/face-to-face, chart review, interpretation not otherwise reported, documentation, and updating of the computerized record.      I reviewed the DXA and spent more than 50% of the time in counselling with the patient.  Alexia Rogers MD, PhD'          Marcia is a  70 year old female post menopausal] [GR0, P0] that presents today for osteoporosis follow up patient was last seen 10/2020 virtually.Spine surgery was 5/2018. Took Forteo for 2 yrs. Received IV reclast  11/2019 and  11/2020. Did have some achy symptoms.     Referring Physician: Referred Nanette Beckford MD , Muriel Yost MD - [facility].      HPI     Have you ever had a bone density test? Yes  Where =  in [place]  When = 9/2019, 6/2017 [facility] , 2015 in [place]  and most recent in [place]  in 9/2021   When = 9/2021  Spine Tscore =  4.9%  Not significant   Left neck Tscore = -1.2  Total left hip Tscore = -0.9  Right neck Tscore = -1.7  Total Right hip Tscore = N-1.5   Gain of hips  40-43%  Radius  T = 33%  T= -2.4   Have you received any x-ray dye or contrast in the last ten days? No  How many servings of dairy products do you consume per day? 2-3 Type: milk 2 glasses/day - cheese, yogurt  Some cottage   Do you take a multi-vitamin daily?  Yes  Do you take a vitamin D supplement? Yes 2000IU   Do you take a calcium supplement daily?  Bone builder with magnesium  1-2/day   Do you take a supplement containing strontium? Not sure   Are you exposed to natural sunlight at least 20 minutes three times a week? Yes    Social History   reports that she has never smoked. She has never used smokeless tobacco. She reports current alcohol use. She reports that she does not use drugs.  Do you smoke cigarettes? No  Do you exercise? Yes. Details: stretching - using a brace - walking some - 1-2 miles/day   Do you drink alcohol? Very little - few times/yr     Medication History  Have you used any of the following medications?   Actonel (Risedronate): No   Aredia (Pamidronate): No   Boniva (Ibindronate): No   Didronil (Etidronate): No   Evista (Raloxifene): No   Fosamax (Alendronate): No    Forteo (Parathyroid hormone) injections: 7/2017-7/2019              HCTZ (Thiazide): No              Calcitonin nasal spray: No              Reclast or Zometa (Zolendronate): IV reclast 11/2019, 11/2020              Prolia (Denosumab): No        Current Outpatient Medications   Medication Sig Dispense Refill     Acetaminophen (TYLENOL PO)        acetaminophen (TYLENOL) 325 MG tablet Take 2 tablets (650 mg) by mouth every 4 hours as needed for other (mild pain) 100 tablet 0     apixaban ANTICOAGULANT (ELIQUIS ANTICOAGULANT) 5 MG tablet Take 1 tablet (5 mg) by mouth daily , and start 0.5 tablet every evening 30 tablet 0     CALCIUM-VITAMIN D PO        cetirizine (ZYRTEC) 10 MG tablet Take 10 mg by mouth daily       Coenzyme Q10 (CO Q 10 PO) Take 1 chew tab by mouth every morning        DAILY MULTI VITAMIN/MINERALS OR 1 TABLET DAILY AT BREAKFAST ON HOLD FOR SURGERY SINCE 05/01/2018       GLUCOSAMINE CHONDR 500 COMPLEX OR CAPS 1 capsule daily at noon       hydrOXYzine (ATARAX) 25 MG tablet Take 1-2 tablets (25-50 mg) by mouth every 6 hours as needed for itching or anxiety (Take with pain  meds.  Helps with nausea, muscle spasms) 50 tablet 0     ibuprofen (ADVIL/MOTRIN) 600 MG tablet Take 1 tablet (600 mg) by mouth every 8 hours as needed for pain (mild) 30 tablet 0     metoprolol tartrate (LOPRESSOR) 25 MG tablet Take 12.5 mg by mouth 2 times daily        Multiple Vitamins-Minerals (VISION FORMULA/LUTEIN) TABS        oxyCODONE (ROXICODONE) 5 MG tablet Take 1-2 tablets (5-10 mg) by mouth every 3 hours as needed for pain (Moderate to Severe) 56 tablet 0     Probiotic Product (PROBIOTIC PO) Take by mouth every other day       senna-docusate (SENOKOT-S/PERICOLACE) 8.6-50 MG tablet Take 1-2 tablets by mouth daily as needed for constipation Take while on oral narcotics to prevent or treat constipation. 10 tablet 0     UNABLE TO FIND Apply 0.25 tsp. topically every other day MEDICATION NAME: Bio- estrol, plant based hormone replacement therapy        UNCLASSIFIED OTC PRODUCT MISC RelaxaMag  1 capsule at bedtime as needed -  a unique magnesium supplement that helps improve sleep and stress tolerance in the evening       valACYclovir (VALTREX) 500 MG tablet Take 1 tablet (500 mg) by mouth 2 times daily For 3 days when flares occur (Patient taking differently: Take 500 mg by mouth daily ) 30 tablet 3          Allergies   Allergen Reactions     Dust Mite Extract      Escitalopram Unknown     Nausea and headache     Hydrocodone Nausea and Vomiting     n/v     Meperidine Nausea and Vomiting     vomiting  vomiting  vomiting     Mold      Trees        Past Medical History    Family History   Problem Relation Age of Onset     Breast Cancer Mother         cancer -free for 17 years, XRT     Arthritis Mother      Eye Disorder Mother         macular degeneration     Heart Disease Mother         murmur     Cerebrovascular Disease Mother      Diabetes Father              Arthritis Father      Arthritis Sister      Blood Disease Paternal Grandfather      Family History Negative No family hx of        ROS:  General:  "hot flashes  Head/Eyes: hair loss and spots/floaters  Ears/Nose/Throat: none  Cardiovascular: palpitations  Respiratory: none  Gastrointestinal: none  Breast: none  Genitourinary: incontinence and vaginal dryness  Sexual Function: none  Musculoskeletal: joint pain and swelling  Skin: none  Neurological: none  Mental Health: none  Endocrine: none    Clinic Measurements  Vitals: /79   Pulse 63   Ht 1.657 m (5' 5.25\")   Wt 59.6 kg (131 lb 8 oz)   BMI 21.72 kg/m    BMI= Body mass index is 21.72 kg/m .    Physical exam  Constitutional: Well appearing woman in no acute distress.   Psychological: appropriate mood.  Neck: No thyroidmegaly.  no carotid bruits.  Cardiovascular: regular rate and rhythm, normal S1 and S2, no murmurs, rubs or gallops, peripheral pulses full and symmetric   Respiratory: clear to auscultation, no wheezes or crackles, normal breath sounds.  Musculoskeletal: no edema and motor strength is equal in the upper and lower extremities    Spine: Straight, not tender, Flexion adequate, Extension adequate, Lateral movement adequate, Rotational movement adequate  Skin: no concerning lesions, no jaundice.  Neurological: cranial nerves intact, normal strength, reflexes at patella and biceps normal, normal gait, no tremor.     LAB  Vertebra; Fracture Assessment: NA  Dexa Scan: 9/2021    FRAX Assessment Tool: [N/A, on meds   Risk Factors:  Age, PM, T score of radius    Alexia Rogers MD, PhD      "

## 2021-10-18 NOTE — PROGRESS NOTES
ASSESSMENT:  This is a 70-year-old postmenopausal female who has osteoporosis by original T-scores.  She initially was on Forteo for 2 years from July 2017 to July 2019.  She had a successful back surgery in 2018.  She took a year break and then had IV Reclast November 2019 and November 2020.  Her most recent DEXA in [place] shows the lowest T score being the radius.  However the score reflects the total radius,and by  using 33% of the radius which is the more accurate reading and that score  is an osteopenic T score.  Her gain in the hips is 40% in 2 years and that is unusually high and  I am suspecting that her new calcium supplement that she is taking called bone builder which was recommended by nutritionist might have strontium in it.  We discussed the negative effects of strontium that it builds bone but not strong bone.  I Suspect that her high percentage increase is related to that.  Overall her T-scores are osteopenic and  they have improved remarkably from her previous DEXA about 2 years ago.  We discussed using calcium carbonate or calcium citrate instead of oyster shell and the bone builder that she is using.  At this time I would suggest 1 more year of IV Reclast which we will set up.  She wants to do it in [place].  Then will take a break from November 2022 to November 2023.  Then repeat the DEXA in early 2024 and see where she is at at that time.  I strongly recommended that she stop the bone builder and that she use a calcium citrate or carbonate for supplement.    PLAN:    Check that the bone builder is not strontium  Do not use oyster shell calcium - it isn't absorbed well  If you need a supplement use calcium citrate   Set up IV reclast - Dr Bills will call you  Then will take a year off 11/2022-11/2023   Get a DXA then in 12/ [id number] and then we will decide what further treatment you might need.   Patient should take 1200mg of calcium/day in divided doses and vitamin D3 2000IU/day.  Dietary  calcium is best   continue walking and be active     Thank you for allowing me to participate in the care of your patient.  Please do not hesitate to call with questions or concerns.    Sincerely,    Alexia Rogers MD, PhD  CC Nanette Yost MD       Time note (e5, 40'): The total time (on the date of service) for this service was 65 minutes, including discussion/face-to-face, chart review, interpretation not otherwise reported, documentation, and updating of the computerized record.      I reviewed the DXA and spent more than 50% of the time in counselling with the patient.  Alexia Rogers MD, PhD'          Marcia is a  70 year old female post menopausal] [GR0, P0] that presents today for osteoporosis follow up patient was last seen 10/2020 virtually.Spine surgery was 5/2018. Took Forteo for 2 yrs. Received IV reclast  11/2019 and  11/2020. Did have some achy symptoms.     Referring Physician: Referred Nanette Beckford MD , Muriel Yost MD - [facility].      HPI     Have you ever had a bone density test? Yes  Where =  in [place]  When = 9/2019, 6/2017 [facility] , 2015 in [place]  and most recent in [place]  in 9/2021   When = 9/2021  Spine Tscore =  4.9%  Not significant   Left neck Tscore = -1.2  Total left hip Tscore = -0.9  Right neck Tscore = -1.7  Total Right hip Tscore = N-1.5   Gain of hips  40-43%  Radius  T = 33%  T= -2.4   Have you received any x-ray dye or contrast in the last ten days? No  How many servings of dairy products do you consume per day? 2-3 Type: milk 2 glasses/day - cheese, yogurt  Some cottage   Do you take a multi-vitamin daily? Yes  Do you take a vitamin D supplement? Yes 2000IU   Do you take a calcium supplement daily?  Bone builder with magnesium  1-2/day   Do you take a supplement containing strontium? Not sure   Are you exposed to natural sunlight at least 20 minutes three times a week? Yes    Social History   reports that she has never smoked.  She has never used smokeless tobacco. She reports current alcohol use. She reports that she does not use drugs.  Do you smoke cigarettes? No  Do you exercise? Yes. Details: stretching - using a brace - walking some - 1-2 miles/day   Do you drink alcohol? Very little - few times/yr     Medication History  Have you used any of the following medications?   Actonel (Risedronate): No   Aredia (Pamidronate): No   Boniva (Ibindronate): No   Didronil (Etidronate): No   Evista (Raloxifene): No   Fosamax (Alendronate): No    Forteo (Parathyroid hormone) injections: 7/2017-7/2019              HCTZ (Thiazide): No              Calcitonin nasal spray: No              Reclast or Zometa (Zolendronate): IV reclast 11/2019, 11/2020              Prolia (Denosumab): No        Current Outpatient Medications   Medication Sig Dispense Refill     Acetaminophen (TYLENOL PO)        acetaminophen (TYLENOL) 325 MG tablet Take 2 tablets (650 mg) by mouth every 4 hours as needed for other (mild pain) 100 tablet 0     apixaban ANTICOAGULANT (ELIQUIS ANTICOAGULANT) 5 MG tablet Take 1 tablet (5 mg) by mouth daily , and start 0.5 tablet every evening 30 tablet 0     CALCIUM-VITAMIN D PO        cetirizine (ZYRTEC) 10 MG tablet Take 10 mg by mouth daily       Coenzyme Q10 (CO Q 10 PO) Take 1 chew tab by mouth every morning        DAILY MULTI VITAMIN/MINERALS OR 1 TABLET DAILY AT BREAKFAST ON HOLD FOR SURGERY SINCE 05/01/2018       GLUCOSAMINE CHONDR 500 COMPLEX OR CAPS 1 capsule daily at noon       hydrOXYzine (ATARAX) 25 MG tablet Take 1-2 tablets (25-50 mg) by mouth every 6 hours as needed for itching or anxiety (Take with pain meds.  Helps with nausea, muscle spasms) 50 tablet 0     ibuprofen (ADVIL/MOTRIN) 600 MG tablet Take 1 tablet (600 mg) by mouth every 8 hours as needed for pain (mild) 30 tablet 0     metoprolol tartrate (LOPRESSOR) 25 MG tablet Take 12.5 mg by mouth 2 times daily        Multiple Vitamins-Minerals (VISION FORMULA/LUTEIN) TABS         oxyCODONE (ROXICODONE) 5 MG tablet Take 1-2 tablets (5-10 mg) by mouth every 3 hours as needed for pain (Moderate to Severe) 56 tablet 0     Probiotic Product (PROBIOTIC PO) Take by mouth every other day       senna-docusate (SENOKOT-S/PERICOLACE) 8.6-50 MG tablet Take 1-2 tablets by mouth daily as needed for constipation Take while on oral narcotics to prevent or treat constipation. 10 tablet 0     UNABLE TO FIND Apply 0.25 tsp. topically every other day MEDICATION NAME: Bio- estrol, plant based hormone replacement therapy        UNCLASSIFIED OTC PRODUCT MISC RelaxaMag  1 capsule at bedtime as needed -  a unique magnesium supplement that helps improve sleep and stress tolerance in the evening       valACYclovir (VALTREX) 500 MG tablet Take 1 tablet (500 mg) by mouth 2 times daily For 3 days when flares occur (Patient taking differently: Take 500 mg by mouth daily ) 30 tablet 3          Allergies   Allergen Reactions     Dust Mite Extract      Escitalopram Unknown     Nausea and headache     Hydrocodone Nausea and Vomiting     n/v     Meperidine Nausea and Vomiting     vomiting  vomiting  vomiting     Mold      Trees        Past Medical History    Family History   Problem Relation Age of Onset     Breast Cancer Mother         cancer -free for 17 years, XRT     Arthritis Mother      Eye Disorder Mother         macular degeneration     Heart Disease Mother         murmur     Cerebrovascular Disease Mother      Diabetes Father              Arthritis Father      Arthritis Sister      Blood Disease Paternal Grandfather      Family History Negative No family hx of        ROS:  General: hot flashes  Head/Eyes: hair loss and spots/floaters  Ears/Nose/Throat: none  Cardiovascular: palpitations  Respiratory: none  Gastrointestinal: none  Breast: none  Genitourinary: incontinence and vaginal dryness  Sexual Function: none  Musculoskeletal: joint pain and swelling  Skin: none  Neurological: none  Mental Health:  "none  Endocrine: none    Clinic Measurements  Vitals: /79   Pulse 63   Ht 1.657 m (5' 5.25\")   Wt 59.6 kg (131 lb 8 oz)   BMI 21.72 kg/m    BMI= Body mass index is 21.72 kg/m .    Physical exam  Constitutional: Well appearing woman in no acute distress.   Psychological: appropriate mood.  Neck: No thyroidmegaly.  no carotid bruits.  Cardiovascular: regular rate and rhythm, normal S1 and S2, no murmurs, rubs or gallops, peripheral pulses full and symmetric   Respiratory: clear to auscultation, no wheezes or crackles, normal breath sounds.  Musculoskeletal: no edema and motor strength is equal in the upper and lower extremities    Spine: Straight, not tender, Flexion adequate, Extension adequate, Lateral movement adequate, Rotational movement adequate  Skin: no concerning lesions, no jaundice.  Neurological: cranial nerves intact, normal strength, reflexes at patella and biceps normal, normal gait, no tremor.     LAB  Vertebra; Fracture Assessment: NA  Dexa Scan: 9/2021    FRAX Assessment Tool: [N/A, on meds   Risk Factors:  Age, PM, T score of radius    Alexia Rogers MD, PhD    "

## 2021-10-19 DIAGNOSIS — M81.0 OSTEOPOROSIS, UNSPECIFIED OSTEOPOROSIS TYPE, UNSPECIFIED PATHOLOGICAL FRACTURE PRESENCE: Primary | ICD-10-CM

## 2021-10-19 RX ORDER — NALOXONE HYDROCHLORIDE 0.4 MG/ML
0.2 INJECTION, SOLUTION INTRAMUSCULAR; INTRAVENOUS; SUBCUTANEOUS
Status: CANCELLED | OUTPATIENT
Start: 2021-10-19

## 2021-10-19 RX ORDER — METHYLPREDNISOLONE SODIUM SUCCINATE 125 MG/2ML
125 INJECTION, POWDER, LYOPHILIZED, FOR SOLUTION INTRAMUSCULAR; INTRAVENOUS
Status: CANCELLED
Start: 2021-10-19

## 2021-10-19 RX ORDER — ZOLEDRONIC ACID 5 MG/100ML
5 INJECTION, SOLUTION INTRAVENOUS ONCE
Status: CANCELLED
Start: 2021-10-19 | End: 2021-10-19

## 2021-10-19 RX ORDER — EPINEPHRINE 1 MG/ML
0.3 INJECTION, SOLUTION, CONCENTRATE INTRAVENOUS EVERY 5 MIN PRN
Status: CANCELLED | OUTPATIENT
Start: 2021-10-19

## 2021-10-19 RX ORDER — DIPHENHYDRAMINE HYDROCHLORIDE 50 MG/ML
50 INJECTION INTRAMUSCULAR; INTRAVENOUS
Status: CANCELLED
Start: 2021-10-19

## 2021-10-19 RX ORDER — HEPARIN SODIUM (PORCINE) LOCK FLUSH IV SOLN 100 UNIT/ML 100 UNIT/ML
5 SOLUTION INTRAVENOUS
Status: CANCELLED | OUTPATIENT
Start: 2021-10-19

## 2021-10-19 RX ORDER — ALBUTEROL SULFATE 90 UG/1
1-2 AEROSOL, METERED RESPIRATORY (INHALATION)
Status: CANCELLED
Start: 2021-10-19

## 2021-10-19 RX ORDER — ALBUTEROL SULFATE 0.83 MG/ML
2.5 SOLUTION RESPIRATORY (INHALATION)
Status: CANCELLED | OUTPATIENT
Start: 2021-10-19

## 2021-10-19 RX ORDER — HEPARIN SODIUM,PORCINE 10 UNIT/ML
5 VIAL (ML) INTRAVENOUS
Status: CANCELLED | OUTPATIENT
Start: 2021-10-19

## 2021-11-02 ENCOUNTER — TELEPHONE (OUTPATIENT)
Dept: FAMILY MEDICINE | Facility: CLINIC | Age: 71
End: 2021-11-02
Payer: COMMERCIAL

## 2021-11-16 NOTE — TELEPHONE ENCOUNTER
[employer] message sent to patient on 11/2/21 that was not read regarding ensuring that Reclast can be given at that clinic.    Called patient and left VM instructing her to call back to discuss further.   
[facility] Call Center    Phone Message    May a detailed message be left on voicemail: yes     Reason for Call: Order(s): Other:   Reason for requested: Pt would like to have infusion done at [facility] with her primary provider. Please fax order to  Dr Manish Yost  Date needed: asap  Provider name: Ken      Action Taken: Other: whs fm    Travel Screening: Not Applicable                                                                        
Pt calling in again, please call her back once orders are taken care.   
Spoke with patient, she states that she has confirmed she can complete Reclast infusion at [facility]. Requests that orders be faxed to [phone].    Advised that will send orders now and to call back if she has any trouble scheduling. Pt verbalized understanding and agreement.     Orders faxed to [phone] as requested.     1/31/22  Filled out form for IV reclast to be faxed to [facility]   -   Alexia Rogers MD, PhD    
Statement Selected

## 2022-02-01 ENCOUNTER — MEDICAL CORRESPONDENCE (OUTPATIENT)
Dept: HEALTH INFORMATION MANAGEMENT | Facility: CLINIC | Age: 72
End: 2022-02-01
Payer: COMMERCIAL

## 2022-02-20 ENCOUNTER — HEALTH MAINTENANCE LETTER (OUTPATIENT)
Age: 72
End: 2022-02-20

## 2022-10-22 ENCOUNTER — HEALTH MAINTENANCE LETTER (OUTPATIENT)
Age: 72
End: 2022-10-22

## 2022-12-10 ENCOUNTER — HEALTH MAINTENANCE LETTER (OUTPATIENT)
Age: 72
End: 2022-12-10

## 2023-04-17 NOTE — PROGRESS NOTES
10-21-20 order for COVID is placed - she wants to do it at [facility] -  Date for testing is 111/13 and infusion is 11/18.  Told pt when she is called for time of testing to tell them she wants to do it at [facility].  lAexia Rogers MD, PhD     Yes

## 2023-06-08 ENCOUNTER — TRANSFERRED RECORDS (OUTPATIENT)
Dept: HEALTH INFORMATION MANAGEMENT | Facility: CLINIC | Age: 73
End: 2023-06-08
Payer: COMMERCIAL

## 2023-10-02 NOTE — ANESTHESIA CARE TRANSFER NOTE
Patient: Marcia Kauffman    Procedure(s):  O-Arm/Stealth Assisted Lumbar 2 To Sacral 1 Posterior Instrumented Fusion, Pelvic Fixation, Revision Of Lumbar 4-5 Instrumentation, Transforaminal Interbody Fusion with bone morphogenic protein, Oshea-Limon Osteotomies,  Lumbar 2-3, 3-4, 4-5 - Wound Class: I-Clean    Diagnosis: Flatback Syndrome Of Thoracolumbar Region   Diagnosis Additional Information: No value filed.    Anesthesia Type:   General, ETT     Note:  Airway :Face Mask  Patient transferred to:PACU  Comments: VSS. Breathing spontaneously at a regular rate with adequate tidal volumes and maintaining O2 sats on 6L facemask. Denies nausea or pain. No apparent complications from anesthesia.     Jeffry Cadena MD  Anesthesiology resident   [facility]  Handoff Report: Identifed the Patient, Identified the Reponsible Provider, Reviewed the pertinent medical history, Discussed the surgical course, Reviewed Intra-OP anesthesia mangement and issues during anesthesia, Set expectations for post-procedure period and Allowed opportunity for questions and acknowledgement of understanding      Vitals: (Last set prior to Anesthesia Care Transfer)    CRNA VITALS  5/14/2018 1409 - 5/14/2018 1449      5/14/2018             EKG: Sinus rhythm                Electronically Signed By: Jeffry Cadena MD  May 14, 2018  2:49 PM   English

## 2023-11-05 ENCOUNTER — HEALTH MAINTENANCE LETTER (OUTPATIENT)
Age: 73
End: 2023-11-05

## (undated) DEVICE — SYR 10ML LL W/O NDL 302995

## (undated) DEVICE — WIPES FOLEY CARE SURESTEP PROVON DFC100

## (undated) DEVICE — SYR 03ML LL W/O NDL 309657

## (undated) DEVICE — NDL SPINAL 18GA 3.5" 405184

## (undated) DEVICE — LINEN TOWEL PACK X6 WHITE 5487

## (undated) DEVICE — BLADE BONE MILL STRK 3.2MM FINE 5400-702-000

## (undated) DEVICE — ESU PENCIL W/COATED BLADE E2450H

## (undated) DEVICE — GLOVE PROTEXIS W/NEU-THERA 8.0  2D73TE80

## (undated) DEVICE — GOWN IMPERVIOUS SPECIALTY XLG/XLONG 32474

## (undated) DEVICE — GLOVE PROTEXIS BLUE W/NEU-THERA 6.5  2D73EB65

## (undated) DEVICE — SYR BULB IRRIG 50ML LATEX FREE 0035280

## (undated) DEVICE — DRSG AQUACEL AG 3.5X9.75" HYDROFIBER 412011

## (undated) DEVICE — CUP AND LID 4OZ STERILE SSK9008A

## (undated) DEVICE — SU VICRYL 1 CT-1 36" UND J947H

## (undated) DEVICE — ESU GROUND PAD ADULT W/CORD E7507

## (undated) DEVICE — CELL SAVER

## (undated) DEVICE — PITCHER STERILE 1000ML  SSK9004A

## (undated) DEVICE — KIT PATIENT CARE PROAXIS SYSTEM 6988-PV-ACP

## (undated) DEVICE — SYR 30ML LL W/O NDL 302832

## (undated) DEVICE — DRAPE STERI U 1015

## (undated) DEVICE — SPONGE LAP 18X18" X8435

## (undated) DEVICE — SU DERMABOND ADVANCED .7ML DNX12

## (undated) DEVICE — TEASPOON METAL STERILE 17506/24

## (undated) DEVICE — MOUSE O ARM 9732721

## (undated) DEVICE — SUCTION MANIFOLD DORNOCH ULTRA CART UL-CL500

## (undated) DEVICE — BONE CLEANING TIP INTERPULSE  0210-010-000

## (undated) DEVICE — IOM MONITORING 15 MIN

## (undated) DEVICE — SUCTION TIP YANKAUER STR K87

## (undated) DEVICE — SOL NACL 0.9% IRRIG 1000ML BOTTLE 07138-09

## (undated) DEVICE — GLOVE PROTEXIS BLUE W/NEU-THERA 7.5  2D73EB75

## (undated) DEVICE — DRAPE C-ARMOR 5 SIDED 5523

## (undated) DEVICE — Device

## (undated) DEVICE — BLADE SAW SAGITTAL STRK 21X90X1.19MM HD SYS 6 6221-119-090

## (undated) DEVICE — MARKER SPHERES PASSIVE MEDT PACK 5 8801075

## (undated) DEVICE — SPONGE COTTONOID 1X3" 20-10S

## (undated) DEVICE — SU MONOCRYL 4-0 PS-2 27" UND Y426H

## (undated) DEVICE — DRAPE BACK TABLE  44X90" 8377

## (undated) DEVICE — SYR EAR BULB 3OZ 0035830

## (undated) DEVICE — BNDG COBAN 6"X5YDS STERILE

## (undated) DEVICE — ADH FLOSEAL W/HUMAN THROMBIN 5ML W/APPLICATOR TIP ADS201844

## (undated) DEVICE — SUCTION TIP YANKAUER W/O VENT K86

## (undated) DEVICE — SOL WATER IRRIG 1000ML BOTTLE 2F7114

## (undated) DEVICE — DRSG DRAIN 2X2" 7087

## (undated) DEVICE — COVER CAMERA IN-LIGHT DISP LT-C02

## (undated) DEVICE — PACK GOWN 3/PK DISP XL SBA32GPFCB

## (undated) DEVICE — DRAPE SHEET MED 44X70" 9355

## (undated) DEVICE — DRAIN HEMOVAC RESERVOIR KIT 10FR 1/8" MED 00-2550-002-10

## (undated) DEVICE — BONE CEMENT KIT BOWL AND SPATULA STRK 6201-3-410

## (undated) DEVICE — PACK SPINE INSTRUMENT DRAPE 76091-ACM7820

## (undated) DEVICE — CATH TRAY FOLEY SURESTEP 16FR W/TMP PRB STLK LATEX A319416AM

## (undated) DEVICE — PREP CHLORAPREP CLEAR 3ML 260400

## (undated) DEVICE — SPONGE SURGIFOAM 100 1974

## (undated) DEVICE — SUCTION IRR SYSTEM W/TIP INTERPULSE

## (undated) DEVICE — PREP CHLORAPREP 26ML TINTED ORANGE  260815

## (undated) DEVICE — GLOVE PROTEXIS MICRO 7.0  2D73PM70

## (undated) DEVICE — DRAPE ISOLATION BAG 1003

## (undated) DEVICE — NDL BLUNT 15GA 1.5"

## (undated) DEVICE — SU VICRYL 0 CT-1 27" UND J260H

## (undated) DEVICE — SU MONOCRYL 2-0 CT-1 36" UND Y945H

## (undated) DEVICE — SU VICRYL 2-0 CT-2 27" UND J269H

## (undated) DEVICE — FLOSEAL 10ML

## (undated) DEVICE — LINEN TOWEL PACK X30 5481

## (undated) DEVICE — SU MONOCRYL 3-0 PS-2 18" UND Y497G

## (undated) DEVICE — DRAPE C-ARM W/STRAPS 42X72" 07-CA104

## (undated) DEVICE — DRAPE IOBAN INCISE 13X13" 6640EZ

## (undated) DEVICE — BRUSH SURGICAL SCRUB W/4% CHG SOL 25ML 371073

## (undated) DEVICE — BLADE SAW OSCIL/SAG STRK 11X90X1.19MM 4/2000 4111-119-090

## (undated) DEVICE — STOCKING SLEEVE COMPRESSION CALF MED

## (undated) DEVICE — CUP AND LID 2PK 2OZ STERILE  SSK9006A

## (undated) DEVICE — DRSG PRIMAPORE 02X3" 7133

## (undated) DEVICE — SU VICRYL 0 CT-1 CR 8X18" J740D

## (undated) DEVICE — SU ETHILON 3-0 PS-1 18" 1663H

## (undated) DEVICE — BUR MATCHSTICK 3MM ANSPACH L-8NS-G1

## (undated) DEVICE — ESU CORD BIPOLAR GREEN 10-4000

## (undated) DEVICE — SOL WATER IRRIG 1000ML BOTTLE 07139-09

## (undated) DEVICE — GOWN XLG DISP 9545

## (undated) DEVICE — ESU PENCIL SMOKE EVAC W/ROCKER SWITCH 0703-047-000

## (undated) DEVICE — DRSG GAUZE 4X4" TRAY 6939

## (undated) DEVICE — SU VICRYL 2-0 CT-2 CR 8X18" J726D

## (undated) DEVICE — DRAPE O ARM TUBE 9732722

## (undated) DEVICE — GLOVE PROTEXIS W/NEU-THERA 6.5  2D73TE65

## (undated) DEVICE — PACK NEURO MINOR UMMC SNE32MNMU4

## (undated) DEVICE — SPONGE COTTONOID 1/2X1 1/2" 20-06S

## (undated) DEVICE — IOM SUPPLIES

## (undated) DEVICE — SU PDO 1 STRATAFIX 36X36CM CTX TAPERPOINT SXPD2B405

## (undated) DEVICE — SOL NACL 0.9% IRRIG 3000ML BAG 07972-08

## (undated) DEVICE — COVER BIG CASE BACK TABLE 6'X2' 420-HD-S

## (undated) DEVICE — SPONGE COTTONOID 1/2X1/2" 20-04S

## (undated) RX ORDER — PHENYLEPHRINE HYDROCHLORIDE 25 MG/ML
SOLUTION/ DROPS OPHTHALMIC
Status: DISPENSED
Start: 2021-01-06

## (undated) RX ORDER — SCOLOPAMINE TRANSDERMAL SYSTEM 1 MG/1
PATCH, EXTENDED RELEASE TRANSDERMAL
Status: DISPENSED
Start: 2018-05-14

## (undated) RX ORDER — ONDANSETRON 2 MG/ML
INJECTION INTRAMUSCULAR; INTRAVENOUS
Status: DISPENSED
Start: 2018-05-14

## (undated) RX ORDER — PHENYLEPHRINE HCL IN 0.9% NACL 1 MG/10 ML
SYRINGE (ML) INTRAVENOUS
Status: DISPENSED
Start: 2018-05-14

## (undated) RX ORDER — FENTANYL CITRATE 50 UG/ML
INJECTION, SOLUTION INTRAMUSCULAR; INTRAVENOUS
Status: DISPENSED
Start: 2020-07-09

## (undated) RX ORDER — PROPOFOL 10 MG/ML
INJECTION, EMULSION INTRAVENOUS
Status: DISPENSED
Start: 2018-05-14

## (undated) RX ORDER — ONDANSETRON 2 MG/ML
INJECTION INTRAMUSCULAR; INTRAVENOUS
Status: DISPENSED
Start: 2020-07-09

## (undated) RX ORDER — CEFAZOLIN SODIUM 2 G/100ML
INJECTION, SOLUTION INTRAVENOUS
Status: DISPENSED
Start: 2018-05-14

## (undated) RX ORDER — DEXAMETHASONE SODIUM PHOSPHATE 4 MG/ML
INJECTION, SOLUTION INTRA-ARTICULAR; INTRALESIONAL; INTRAMUSCULAR; INTRAVENOUS; SOFT TISSUE
Status: DISPENSED
Start: 2020-07-09

## (undated) RX ORDER — TROPICAMIDE 10 MG/ML
SOLUTION/ DROPS OPHTHALMIC
Status: DISPENSED
Start: 2021-01-06

## (undated) RX ORDER — PHENYLEPHRINE HYDROCHLORIDE 25 MG/ML
SOLUTION/ DROPS OPHTHALMIC
Status: DISPENSED
Start: 2020-12-16

## (undated) RX ORDER — GLYCOPYRROLATE 0.2 MG/ML
INJECTION, SOLUTION INTRAMUSCULAR; INTRAVENOUS
Status: DISPENSED
Start: 2018-05-14

## (undated) RX ORDER — CEFAZOLIN SODIUM 1 G/3ML
INJECTION, POWDER, FOR SOLUTION INTRAMUSCULAR; INTRAVENOUS
Status: DISPENSED
Start: 2018-05-14

## (undated) RX ORDER — DEXAMETHASONE SODIUM PHOSPHATE 4 MG/ML
INJECTION, SOLUTION INTRA-ARTICULAR; INTRALESIONAL; INTRAMUSCULAR; INTRAVENOUS; SOFT TISSUE
Status: DISPENSED
Start: 2018-05-14

## (undated) RX ORDER — ALBUMIN, HUMAN INJ 5% 5 %
SOLUTION INTRAVENOUS
Status: DISPENSED
Start: 2018-05-14

## (undated) RX ORDER — LIDOCAINE HYDROCHLORIDE 10 MG/ML
INJECTION, SOLUTION EPIDURAL; INFILTRATION; INTRACAUDAL; PERINEURAL
Status: DISPENSED
Start: 2020-07-09

## (undated) RX ORDER — PHENYLEPHRINE HCL IN 0.9% NACL 1 MG/10 ML
SYRINGE (ML) INTRAVENOUS
Status: DISPENSED
Start: 2020-07-09

## (undated) RX ORDER — BUPIVACAINE HYDROCHLORIDE AND EPINEPHRINE 5; 5 MG/ML; UG/ML
INJECTION, SOLUTION EPIDURAL; INTRACAUDAL; PERINEURAL
Status: DISPENSED
Start: 2018-05-14

## (undated) RX ORDER — HYDROMORPHONE HCL/0.9% NACL/PF 0.2MG/0.2
SYRINGE (ML) INTRAVENOUS
Status: DISPENSED
Start: 2018-05-14

## (undated) RX ORDER — CEFAZOLIN SODIUM 2 G/100ML
INJECTION, SOLUTION INTRAVENOUS
Status: DISPENSED
Start: 2020-07-09

## (undated) RX ORDER — ROCURONIUM BROMIDE 50 MG/5 ML
SYRINGE (ML) INTRAVENOUS
Status: DISPENSED
Start: 2018-05-14

## (undated) RX ORDER — SODIUM CHLORIDE 9 MG/ML
INJECTION, SOLUTION INTRAVENOUS
Status: DISPENSED
Start: 2018-05-14

## (undated) RX ORDER — TRANEXAMIC ACID 650 MG/1
TABLET ORAL
Status: DISPENSED
Start: 2020-07-09

## (undated) RX ORDER — FENTANYL CITRATE 50 UG/ML
INJECTION, SOLUTION INTRAMUSCULAR; INTRAVENOUS
Status: DISPENSED
Start: 2018-05-14

## (undated) RX ORDER — MAGNESIUM SULFATE HEPTAHYDRATE 40 MG/ML
INJECTION, SOLUTION INTRAVENOUS
Status: DISPENSED
Start: 2020-07-09

## (undated) RX ORDER — GABAPENTIN 300 MG/1
CAPSULE ORAL
Status: DISPENSED
Start: 2020-07-09

## (undated) RX ORDER — LIDOCAINE HYDROCHLORIDE 20 MG/ML
INJECTION, SOLUTION EPIDURAL; INFILTRATION; INTRACAUDAL; PERINEURAL
Status: DISPENSED
Start: 2018-05-14

## (undated) RX ORDER — PROPOFOL 10 MG/ML
INJECTION, EMULSION INTRAVENOUS
Status: DISPENSED
Start: 2020-07-09

## (undated) RX ORDER — TROPICAMIDE 10 MG/ML
SOLUTION/ DROPS OPHTHALMIC
Status: DISPENSED
Start: 2020-12-16

## (undated) RX ORDER — ACETAMINOPHEN 325 MG/1
TABLET ORAL
Status: DISPENSED
Start: 2020-07-09

## (undated) RX ORDER — CALCIUM CHLORIDE 100 MG/ML
INJECTION INTRAVENOUS; INTRAVENTRICULAR
Status: DISPENSED
Start: 2018-05-14